# Patient Record
Sex: MALE | Race: WHITE | NOT HISPANIC OR LATINO | Employment: OTHER | URBAN - METROPOLITAN AREA
[De-identification: names, ages, dates, MRNs, and addresses within clinical notes are randomized per-mention and may not be internally consistent; named-entity substitution may affect disease eponyms.]

---

## 2018-01-29 ENCOUNTER — OFFICE VISIT (OUTPATIENT)
Dept: URGENT CARE | Age: 63
End: 2018-01-29
Payer: COMMERCIAL

## 2018-01-29 VITALS
OXYGEN SATURATION: 96 % | SYSTOLIC BLOOD PRESSURE: 125 MMHG | DIASTOLIC BLOOD PRESSURE: 67 MMHG | WEIGHT: 204 LBS | TEMPERATURE: 98 F | HEIGHT: 65 IN | HEART RATE: 52 BPM | BODY MASS INDEX: 33.99 KG/M2

## 2018-01-29 DIAGNOSIS — H65.191 OTHER ACUTE NONSUPPURATIVE OTITIS MEDIA OF RIGHT EAR, RECURRENCE NOT SPECIFIED: Primary | ICD-10-CM

## 2018-01-29 PROCEDURE — G0382 LEV 3 HOSP TYPE B ED VISIT: HCPCS | Performed by: FAMILY MEDICINE

## 2018-01-29 RX ORDER — ATORVASTATIN CALCIUM 80 MG/1
40 TABLET, FILM COATED ORAL
COMMUNITY
Start: 2014-10-31 | End: 2021-01-01 | Stop reason: HOSPADM

## 2018-01-29 RX ORDER — ACETAMINOPHEN 500 MG
500 TABLET ORAL EVERY 6 HOURS
COMMUNITY
End: 2021-01-01 | Stop reason: HOSPADM

## 2018-01-29 RX ORDER — AMOXICILLIN AND CLAVULANATE POTASSIUM 875; 125 MG/1; MG/1
1 TABLET, FILM COATED ORAL EVERY 12 HOURS SCHEDULED
Qty: 20 TABLET | Refills: 0 | Status: SHIPPED | OUTPATIENT
Start: 2018-01-29 | End: 2018-02-08

## 2018-01-29 RX ORDER — FUROSEMIDE 40 MG/1
40 TABLET ORAL
COMMUNITY
End: 2021-01-01 | Stop reason: SDUPTHER

## 2018-01-29 RX ORDER — AMLODIPINE BESYLATE 5 MG/1
5 TABLET ORAL
COMMUNITY
Start: 2012-12-06 | End: 2021-01-01 | Stop reason: SDUPTHER

## 2018-01-29 RX ORDER — FERROUS SULFATE 325(65) MG
325 TABLET ORAL
COMMUNITY
Start: 2016-10-09 | End: 2021-01-01 | Stop reason: HOSPADM

## 2018-01-29 NOTE — PROGRESS NOTES
Assessment/Plan:    No problem-specific Assessment & Plan notes found for this encounter  Diagnoses and all orders for this visit:    Other acute nonsuppurative otitis media of right ear, recurrence not specified  -     amoxicillin-clavulanate (AUGMENTIN) 875-125 mg per tablet; Take 1 tablet by mouth every 12 (twelve) hours for 10 days    Patient instructions: Start antibiotic  Give probiotic  Tylenol as needed for pain or fever  Rest and drink extra fluids  Try OTC antihistamine  Follow up with PCP if no improvement  Go to ER with worsening symptoms  Subjective:      Patient ID: Grisel Izquierdo is a 57 y o  male  This is a 57year old male here today with right ear pain  He states he has had ear pain for 1 month  He was seen by PCP and given antibiotic, unsure which one  He denies fever or nasal congestion  No cough  He states he has pain and decreased hearing  The following portions of the patient's history were reviewed and updated as appropriate: allergies, current medications, past family history, past medical history, past social history, past surgical history and problem list     Review of Systems   Constitutional: Negative for activity change, chills and fatigue  HENT: Positive for ear pain  Negative for congestion, sinus pain and sinus pressure  Respiratory: Negative for cough  Cardiovascular: Negative for chest pain  Objective:  /67   Pulse (!) 52   Temp 98 °F (36 7 °C)   Ht 5' 5" (1 651 m)   Wt 92 5 kg (204 lb)   SpO2 96%   BMI 33 95 kg/m²      Physical Exam   Constitutional: He appears well-developed and well-nourished  HENT:   Head: Normocephalic  Right TM erythemic and bulging  , Left TM clear  Eyes: Conjunctivae are normal  Pupils are equal, round, and reactive to light  Neck: Normal range of motion  Neck supple  Cardiovascular: Normal rate      Pulmonary/Chest: Effort normal and breath sounds normal

## 2018-01-29 NOTE — PATIENT INSTRUCTIONS
Start antibiotic  Give probiotic  Tylenol as needed for pain or fever  Rest and drink extra fluids  Try OTC antihistamine  Follow up with PCP if no improvement  Go to ER with worsening symptoms  Otitis Media   WHAT YOU NEED TO KNOW:   Otitis media is an ear infection  DISCHARGE INSTRUCTIONS:   Medicines:  · Ibuprofen or acetaminophen  helps decrease your pain and fever  They are available without a doctor's order  Ask your healthcare provider which medicine is right for you  Ask how much to take and how often to take it  These medicines can cause stomach bleeding if not taken correctly  Ibuprofen can cause kidney damage  Do not take ibuprofen if you have kidney disease, an ulcer, or allergies to aspirin  Acetaminophen can cause liver damage  Do not drink alcohol if you take acetaminophen  · Ear drops  help treat your ear pain  · Antibiotics  help treat a bacterial infection that caused your ear infection  · Take your medicine as directed  Contact your healthcare provider if you think your medicine is not helping or if you have side effects  Tell him or her if you are allergic to any medicine  Keep a list of the medicines, vitamins, and herbs you take  Include the amounts, and when and why you take them  Bring the list or the pill bottles to follow-up visits  Carry your medicine list with you in case of an emergency  Heat or ice:   · Heat  may be used to decrease your pain  Place a warm, moist washcloth on your ear  Apply for 15 to 20 minutes, 3 to 4 times a day    · Ice  helps decrease swelling and pain  Use an ice pack or put crushed ice in a plastic bag  Cover the ice pack with a towel and place it on your ear for 15 to 20 minutes, 3 to 4 times a day for 2 days  Prevent otitis media:   · Wash your hands often  Use soap and water  Wash your hands after you use the bathroom, change a child's diapers, or sneeze  Wash your hands before you prepare or eat food       · Stay away from people who are ill   Some germs are easily and quickly spread through contact  Return to work or school: You may return to work or school when your fever is gone  Follow up with your healthcare provider as directed:  Write down your questions so you remember to ask them during your visits  Contact your healthcare provider if:   · Your ear pain gets worse or does not go away, even after treatment  · The outside of your ear is red or swollen  · You have vomiting or diarrhea  · You have fluid coming from your ear  · You have questions or concerns about your condition or care  Return to the emergency department if:   · You have a seizure  · You have a fever and a stiff neck  © 2017 [employer] Information is for End User's use only and may not be sold, redistributed or otherwise used for commercial purposes  All illustrations and images included in CareNotes® are the copyrighted property of [employer]  or [employer]  The above information is an  only  It is not intended as medical advice for individual conditions or treatments  Talk to your doctor, nurse or pharmacist before following any medical regimen to see if it is safe and effective for you

## 2018-01-29 NOTE — PROGRESS NOTES
Unable to reconcile medications correctly due to patients lack of knowledge re this     He states " I take a lot of medications and I dont know "

## 2018-06-21 LAB
ABSOL LYMPHOCYTES (HISTORICAL): 1.4 K/UL (ref 0.5–4)
ALBUMIN SERPL BCP-MCNC: 3.3 G/DL (ref 3–5.2)
ALP SERPL-CCNC: 141 U/L (ref 43–122)
ALT SERPL W P-5'-P-CCNC: 40 U/L (ref 9–52)
ANION GAP SERPL CALCULATED.3IONS-SCNC: 8 MMOL/L (ref 5–14)
AST SERPL W P-5'-P-CCNC: 32 U/L (ref 17–59)
BANDS (HISTORICAL): 9 % (ref 3–11)
BASOPHILS # BLD AUTO: 0.1 K/UL (ref 0–0.1)
BASOPHILS # BLD AUTO: 1 % (ref 0–1)
BILIRUB SERPL-MCNC: 0.4 MG/DL
BUN SERPL-MCNC: 29 MG/DL (ref 5–25)
CALCIUM SERPL-MCNC: 9.2 MG/DL (ref 8.4–10.2)
CHLORIDE SERPL-SCNC: 109 MEQ/L (ref 97–108)
CO2 SERPL-SCNC: 22 MMOL/L (ref 22–30)
COMMENT (HISTORICAL): ABNORMAL
CREATINE, SERUM (HISTORICAL): 1.8 MG/DL (ref 0.7–1.5)
DEPRECATED RDW RBC AUTO: 16.2 %
EGFR (HISTORICAL): 38 ML/MIN/1.73 M2
EOSINOPHIL # BLD AUTO: 0.3 K/UL (ref 0–0.4)
EOSINOPHIL NFR BLD AUTO: 6 % (ref 0–6)
ERYTHROCYTE SEDIMENTATION RATE (HISTORICAL): 64 MM (ref 1–20)
FERRITIN SERPL-MCNC: 139 NG/ML (ref 18–464)
FOLATE SERPL-MCNC: >20 NG/ML
GLUCOSE SERPL-MCNC: 54 MG/DL (ref 70–99)
HCT VFR BLD AUTO: 42 % (ref 41–53)
HGB BLD-MCNC: 13.8 G/DL (ref 13.5–17.5)
IRON SERPL-MCNC: 66 UG/DL (ref 49–181)
LDH (HISTORICAL): 466 U/L (ref 313–618)
LYMPHOCYTES NFR BLD AUTO: 26 % (ref 25–45)
MCH RBC QN AUTO: 25.7 PG (ref 26–34)
MCHC RBC AUTO-ENTMCNC: 32.8 % (ref 31–36)
MCV RBC AUTO: 78 FL (ref 80–100)
MONOCYTES # BLD AUTO: 0.5 K/UL (ref 0.2–0.9)
MONOCYTES NFR BLD AUTO: 9 % (ref 1–10)
NEUTROPHILS ABS COUNT (HISTORICAL): 3.1 K/UL (ref 1.8–7.8)
NEUTS SEG NFR BLD AUTO: 49 % (ref 45–65)
PERCENT SATURATION (HISTORICAL): 24 % (ref 11–46)
PLATELET # BLD AUTO: 133 K/MCL (ref 150–450)
POTASSIUM SERPL-SCNC: 4.4 MEQ/L (ref 3.6–5)
RBC # BLD AUTO: 5.37 M/MCL (ref 4.5–5.9)
RBC MORPHOLOGY (HISTORICAL): ABNORMAL
SODIUM SERPL-SCNC: 139 MEQ/L (ref 137–147)
TIBC SERPL-MCNC: 276 UG/DL (ref 261–462)
TOTAL PROTEIN (HISTORICAL): 7 G/DL (ref 5.9–8.4)
VIT B12 SERPL-MCNC: 959 PG/ML (ref 239–931)
WBC # BLD AUTO: 5.4 K/MCL (ref 4.5–11)

## 2018-06-22 LAB — C-REACTIVE PROTEIN (HISTORICAL): 1.7 MG/DL

## 2018-06-23 LAB — METHYLMALONIC ACID, S (HISTORICAL): 0.23

## 2018-06-25 ENCOUNTER — OFFICE VISIT (OUTPATIENT)
Dept: HEMATOLOGY ONCOLOGY | Facility: CLINIC | Age: 63
End: 2018-06-25
Payer: COMMERCIAL

## 2018-06-25 VITALS
TEMPERATURE: 97.5 F | DIASTOLIC BLOOD PRESSURE: 60 MMHG | HEIGHT: 65 IN | WEIGHT: 208 LBS | BODY MASS INDEX: 34.66 KG/M2 | HEART RATE: 60 BPM | RESPIRATION RATE: 18 BRPM | SYSTOLIC BLOOD PRESSURE: 112 MMHG | OXYGEN SATURATION: 96 %

## 2018-06-25 DIAGNOSIS — D64.9 ANEMIA, UNSPECIFIED TYPE: Primary | ICD-10-CM

## 2018-06-25 PROCEDURE — 99213 OFFICE O/P EST LOW 20 MIN: CPT | Performed by: INTERNAL MEDICINE

## 2018-06-25 RX ORDER — BLOOD SUGAR DIAGNOSTIC
STRIP MISCELLANEOUS
COMMUNITY
Start: 2018-06-22 | End: 2021-01-01 | Stop reason: HOSPADM

## 2018-06-25 RX ORDER — PANTOPRAZOLE SODIUM 40 MG/1
40 TABLET, DELAYED RELEASE ORAL
COMMUNITY
Start: 2017-12-05 | End: 2021-01-01 | Stop reason: HOSPADM

## 2018-06-25 RX ORDER — LINAGLIPTIN 5 MG/1
TABLET, FILM COATED ORAL
COMMUNITY
Start: 2018-06-22 | End: 2021-01-01 | Stop reason: HOSPADM

## 2018-06-25 RX ORDER — METOCLOPRAMIDE 10 MG/1
TABLET ORAL
COMMUNITY
Start: 2018-06-22 | End: 2021-01-01 | Stop reason: SDUPTHER

## 2018-06-25 RX ORDER — LINACLOTIDE 145 UG/1
CAPSULE, GELATIN COATED ORAL
COMMUNITY
Start: 2018-06-22 | End: 2021-01-01 | Stop reason: HOSPADM

## 2018-06-25 RX ORDER — PARICALCITOL 1 UG/1
1 CAPSULE, LIQUID FILLED ORAL
COMMUNITY
Start: 2018-02-05 | End: 2021-01-01 | Stop reason: HOSPADM

## 2018-06-25 RX ORDER — IBUPROFEN 800 MG/1
800 TABLET ORAL EVERY 6 HOURS
Refills: 0 | COMMUNITY
Start: 2018-04-30 | End: 2021-01-01 | Stop reason: HOSPADM

## 2018-06-25 RX ORDER — KETOCONAZOLE 20 MG/ML
SHAMPOO TOPICAL
COMMUNITY
Start: 2018-06-22 | End: 2021-01-01 | Stop reason: HOSPADM

## 2018-06-25 RX ORDER — ISOSORBIDE MONONITRATE 60 MG/1
TABLET, EXTENDED RELEASE ORAL
COMMUNITY
Start: 2018-06-22 | End: 2020-04-30

## 2018-06-25 RX ORDER — MULTIVITAMIN WITH FOLIC ACID 400 MCG
TABLET ORAL
COMMUNITY
Start: 2018-04-03 | End: 2021-01-01 | Stop reason: SDUPTHER

## 2018-06-25 RX ORDER — POLYETHYLENE GLYCOL 1000
17 POWDER (GRAM) MISCELLANEOUS
COMMUNITY
Start: 2016-04-19 | End: 2021-01-01 | Stop reason: HOSPADM

## 2018-06-25 NOTE — PROGRESS NOTES
Hematology/Oncology Outpatient Follow-up  Delicia Dan 57 y o  male 1955 [id number]    Date:  6/25/2018      Assessment and Plan:    The patient continues to have chronic inflammatory process with elevated C-reactive protein and sed rate  He has microcytosis without anemia and mild thrombocytopenia  The patient was encouraged to continue with the current medication including vitamin B12 supplements, we will then see him in 6 months from now for close follow-up  HPI:    Patient is doing well, his blood work seems to be stable  He only complains about occasional respiratory symptoms  He did lose some weight that through the healthy dieting  Interval history:    ROS: Review of Systems   Constitutional: Negative for appetite change, diaphoresis, fatigue and fever  HENT: Negative for congestion, dental problem, facial swelling, hearing loss, tinnitus and trouble swallowing  Eyes: Negative for visual disturbance  Respiratory: Positive for shortness of breath (SOB at rest)  Negative for cough, chest tightness and wheezing  Cardiovascular: Negative for chest pain and leg swelling  Gastrointestinal: Negative for abdominal distention, abdominal pain, blood in stool, constipation, diarrhea, nausea and vomiting  Genitourinary: Negative for dysuria, hematuria and urgency  Musculoskeletal: Negative for arthralgias, myalgias and neck pain  Skin: Negative  Negative for color change, pallor, rash and wound  Neurological: Negative for dizziness, weakness, numbness and headaches  Hematological: Negative for adenopathy  Psychiatric/Behavioral: Negative for agitation, behavioral problems, confusion, hallucinations, self-injury and sleep disturbance  The patient is not nervous/anxious and is not hyperactive  Past Medical History:   Diagnosis Date    Chronic kidney disease     GERD (gastroesophageal reflux disease)     Hypertension        No past surgical history on file      Social History     Social History    Marital status: /Civil Union     Spouse name: N/A    Number of children: N/A    Years of education: N/A     Social History Main Topics    Smoking status: Not on file    Smokeless tobacco: Not on file    Alcohol use Not on file    Drug use: Unknown    Sexual activity: Not on file     Other Topics Concern    Not on file     Social History Narrative    No narrative on file       No family history on file  Allergies   Allergen Reactions    Calcium Itching         Current Outpatient Prescriptions:     acetaminophen (TYLENOL) 500 mg tablet, Take 500 mg by mouth every 6 (six) hours, Disp: , Rfl:     amLODIPine (NORVASC) 5 mg tablet, Take 5 mg by mouth, Disp: , Rfl:     Artificial Tear Ointment (REFRESH LACRI-LUBE OP), Apply 1 drop to eye 4 (four) times a day, Disp: , Rfl:     atorvastatin (LIPITOR) 80 mg tablet, Take 40 mg by mouth, Disp: , Rfl:     Cholecalciferol 2000 units CAPS, Take 1 capsule by mouth, Disp: , Rfl:     ferrous sulfate 325 (65 Fe) mg tablet, Take 325 mg by mouth, Disp: , Rfl:     furosemide (LASIX) 40 mg tablet, Take 40 mg by mouth, Disp: , Rfl:       Physical Exam:  There were no vitals taken for this visit  Physical Exam   Constitutional: He is oriented to person, place, and time  He appears well-developed and well-nourished  HENT:   Head: Normocephalic and atraumatic  Nose: Nose normal    Mouth/Throat: Oropharynx is clear and moist    Eyes: Conjunctivae and EOM are normal  Pupils are equal, round, and reactive to light  Right eye exhibits no discharge  Left eye exhibits no discharge  No scleral icterus  Neck: Normal range of motion  Neck supple  No tracheal deviation present  No thyromegaly present  Cardiovascular: Normal rate, regular rhythm and normal heart sounds  Exam reveals no friction rub  No murmur heard  Pulmonary/Chest: Breath sounds normal  He is in respiratory distress (SOB at rest)  He has no wheezes   He has no rales  He exhibits no tenderness  Abdominal: Soft  Bowel sounds are normal  He exhibits no distension and no mass  There is no hepatosplenomegaly, splenomegaly or hepatomegaly  There is no tenderness  There is no rebound and no guarding  Musculoskeletal: Normal range of motion  He exhibits no edema, tenderness or deformity  Lymphadenopathy:     He has no cervical adenopathy  Neurological: He is alert and oriented to person, place, and time  He has normal reflexes  No cranial nerve deficit  Coordination normal    Skin: Skin is warm and dry  No rash noted  No erythema  No pallor  Psychiatric: He has a normal mood and affect  His behavior is normal  Judgment and thought content normal          Labs:  Lab Results   Component Value Date    WBC 5 4 06/21/2018    HGB 13 8 06/21/2018    HCT 42 0 06/21/2018    MCV 78 (L) 06/21/2018     (L) 06/21/2018     Lab Results   Component Value Date     06/21/2018    K 4 4 06/21/2018     (H) 06/21/2018    CO2 22 06/21/2018    ANIONGAP 8 06/21/2018    BUN 29 (H) 06/21/2018    GLUCOSE 54 (L) 06/21/2018    CALCIUM 9 2 06/21/2018    AST 32 06/21/2018    ALT 40 06/21/2018    ALKPHOS 141 (H) 06/21/2018    PROT 7 0 06/21/2018    BILITOT 0 4 06/21/2018       Patient voiced understanding and agreement in the above discussion  Aware to contact our office with questions/symptoms in the interim

## 2018-08-03 ENCOUNTER — APPOINTMENT (OUTPATIENT)
Dept: LAB | Facility: HOSPITAL | Age: 63
End: 2018-08-03
Payer: COMMERCIAL

## 2018-08-03 ENCOUNTER — TRANSCRIBE ORDERS (OUTPATIENT)
Dept: ADMINISTRATIVE | Facility: HOSPITAL | Age: 63
End: 2018-08-03

## 2018-08-03 DIAGNOSIS — I13.10 HYPERTENSIVE HEART DISEASE, BENIGN, WITH CHRONIC KIDNEY DISEASE STAGE 1-4: ICD-10-CM

## 2018-08-03 DIAGNOSIS — N25.81 SECONDARY HYPERPARATHYROIDISM (HCC): ICD-10-CM

## 2018-08-03 DIAGNOSIS — K92.2 GASTRIC HEMORRHAGE: ICD-10-CM

## 2018-08-03 DIAGNOSIS — N40.0 PROSTATE HYPERTROPHY: ICD-10-CM

## 2018-08-03 DIAGNOSIS — I25.10 CORONARY ARTERIOSCLEROSIS: ICD-10-CM

## 2018-08-03 DIAGNOSIS — E10.22 TYPE 1 DIABETES MELLITUS WITH DIABETIC CHRONIC KIDNEY DISEASE, UNSPECIFIED CKD STAGE (HCC): ICD-10-CM

## 2018-08-03 DIAGNOSIS — N18.30 CHRONIC KIDNEY DISEASE, STAGE III (MODERATE) (HCC): ICD-10-CM

## 2018-08-03 DIAGNOSIS — D50.9 IRON DEFICIENCY ANEMIA, UNSPECIFIED IRON DEFICIENCY ANEMIA TYPE: ICD-10-CM

## 2018-08-03 DIAGNOSIS — N18.9 HYPERTENSIVE HEART DISEASE, BENIGN, WITH CHRONIC KIDNEY DISEASE STAGE 1-4: ICD-10-CM

## 2018-08-03 DIAGNOSIS — R60.0 EDEMA OF LOWER EXTREMITY: ICD-10-CM

## 2018-08-03 DIAGNOSIS — N20.0 KIDNEY STONE: ICD-10-CM

## 2018-08-03 DIAGNOSIS — E55.9 VITAMIN D DEFICIENCY, UNSPECIFIED: ICD-10-CM

## 2018-08-03 DIAGNOSIS — N18.30 CHRONIC KIDNEY DISEASE, STAGE III (MODERATE) (HCC): Primary | ICD-10-CM

## 2018-08-03 LAB
ALBUMIN SERPL BCP-MCNC: 3.9 G/DL (ref 3–5.2)
ANION GAP SERPL CALCULATED.3IONS-SCNC: 7 MMOL/L (ref 5–14)
BILIRUB UR QL STRIP: NEGATIVE
BUN SERPL-MCNC: 24 MG/DL (ref 5–25)
CALCIUM SERPL-MCNC: 9.3 MG/DL (ref 8.4–10.2)
CHLORIDE SERPL-SCNC: 106 MMOL/L (ref 97–108)
CLARITY UR: CLEAR
CO2 SERPL-SCNC: 28 MMOL/L (ref 22–30)
COLOR UR: YELLOW
CREAT SERPL-MCNC: 1.76 MG/DL (ref 0.7–1.5)
ERYTHROCYTE [DISTWIDTH] IN BLOOD BY AUTOMATED COUNT: 16.1 %
GFR SERPL CREATININE-BSD FRML MDRD: 40 ML/MIN/1.73SQ M
GLUCOSE P FAST SERPL-MCNC: 47 MG/DL (ref 70–99)
GLUCOSE UR STRIP-MCNC: NEGATIVE MG/DL
HCT VFR BLD AUTO: 43 % (ref 41–53)
HGB BLD-MCNC: 14.2 G/DL (ref 13.5–17.5)
HGB UR QL STRIP.AUTO: NEGATIVE
KETONES UR STRIP-MCNC: NEGATIVE MG/DL
LEUKOCYTE ESTERASE UR QL STRIP: NEGATIVE
MAGNESIUM SERPL-MCNC: 1.8 MG/DL (ref 1.6–2.3)
MCH RBC QN AUTO: 26.4 PG (ref 26–34)
MCHC RBC AUTO-ENTMCNC: 32.9 G/DL (ref 31–36)
MCV RBC AUTO: 80 FL (ref 80–100)
NITRITE UR QL STRIP: NEGATIVE
PH UR STRIP.AUTO: 6 [PH] (ref 4.5–8)
PHOSPHATE SERPL-MCNC: 4 MG/DL (ref 2.5–4.8)
PLATELET # BLD AUTO: 134 THOUSANDS/UL (ref 150–450)
PMV BLD AUTO: 8.6 FL (ref 8.9–12.7)
POTASSIUM SERPL-SCNC: 4 MMOL/L (ref 3.6–5)
PROT UR STRIP-MCNC: NEGATIVE MG/DL
PTH-INTACT SERPL-MCNC: 46.7 PG/ML (ref 16.7–78.9)
RBC # BLD AUTO: 5.37 MILLION/UL (ref 4.5–5.9)
SODIUM SERPL-SCNC: 141 MMOL/L (ref 137–147)
SP GR UR STRIP.AUTO: 1.01 (ref 1–1.04)
URATE SERPL-MCNC: 8 MG/DL (ref 3.5–8.5)
UROBILINOGEN UA: NEGATIVE MG/DL
WBC # BLD AUTO: 5.5 THOUSAND/UL (ref 4.5–11)

## 2018-08-03 PROCEDURE — 84550 ASSAY OF BLOOD/URIC ACID: CPT

## 2018-08-03 PROCEDURE — 85027 COMPLETE CBC AUTOMATED: CPT

## 2018-08-03 PROCEDURE — 83970 ASSAY OF PARATHORMONE: CPT

## 2018-08-03 PROCEDURE — 83735 ASSAY OF MAGNESIUM: CPT

## 2018-08-03 PROCEDURE — 36415 COLL VENOUS BLD VENIPUNCTURE: CPT

## 2018-08-03 PROCEDURE — 80069 RENAL FUNCTION PANEL: CPT

## 2018-09-24 ENCOUNTER — TRANSCRIBE ORDERS (OUTPATIENT)
Dept: ADMINISTRATIVE | Facility: HOSPITAL | Age: 63
End: 2018-09-24

## 2018-09-24 ENCOUNTER — APPOINTMENT (OUTPATIENT)
Dept: LAB | Facility: HOSPITAL | Age: 63
End: 2018-09-24
Payer: COMMERCIAL

## 2018-09-24 DIAGNOSIS — E66.09 EXOGENOUS OBESITY: ICD-10-CM

## 2018-09-24 DIAGNOSIS — E55.9 AVITAMINOSIS D: ICD-10-CM

## 2018-09-24 DIAGNOSIS — Z79.899 LONG TERM USE OF DRUG: ICD-10-CM

## 2018-09-24 DIAGNOSIS — I10 HYPERTENSION, UNSPECIFIED TYPE: ICD-10-CM

## 2018-09-24 DIAGNOSIS — Z79.899 LONG TERM USE OF DRUG: Primary | ICD-10-CM

## 2018-09-24 DIAGNOSIS — E78.2 MIXED HYPERLIPIDEMIA: ICD-10-CM

## 2018-09-24 DIAGNOSIS — I25.119 ATHEROSCLEROSIS OF NATIVE CORONARY ARTERY WITH ANGINA PECTORIS, UNSPECIFIED WHETHER NATIVE OR TRANSPLANTED HEART (HCC): ICD-10-CM

## 2018-09-24 DIAGNOSIS — N18.6 TYPE 2 DIABETES MELLITUS WITH ESRD (END-STAGE RENAL DISEASE) (HCC): ICD-10-CM

## 2018-09-24 DIAGNOSIS — E11.22 TYPE 2 DIABETES MELLITUS WITH ESRD (END-STAGE RENAL DISEASE) (HCC): ICD-10-CM

## 2018-09-24 DIAGNOSIS — Z79.4 ENCOUNTER FOR LONG-TERM (CURRENT) USE OF INSULIN (HCC): ICD-10-CM

## 2018-09-24 DIAGNOSIS — E11.21 DIABETIC GLOMERULOPATHY (HCC): ICD-10-CM

## 2018-09-24 LAB
ALBUMIN SERPL BCP-MCNC: 3.4 G/DL (ref 3–5.2)
ALP SERPL-CCNC: 155 U/L (ref 43–122)
ALT SERPL W P-5'-P-CCNC: 48 U/L (ref 9–52)
ANION GAP SERPL CALCULATED.3IONS-SCNC: 8 MMOL/L (ref 5–14)
AST SERPL W P-5'-P-CCNC: 54 U/L (ref 17–59)
BASOPHILS # BLD AUTO: 0.04 THOUSAND/UL (ref 0–0.1)
BASOPHILS NFR MAR MANUAL: 1 % (ref 0–1)
BILIRUB SERPL-MCNC: 0.7 MG/DL
BUN SERPL-MCNC: 23 MG/DL (ref 5–25)
CALCIUM SERPL-MCNC: 9 MG/DL (ref 8.4–10.2)
CHLORIDE SERPL-SCNC: 103 MMOL/L (ref 97–108)
CHOLEST SERPL-MCNC: 124 MG/DL
CO2 SERPL-SCNC: 30 MMOL/L (ref 22–30)
CREAT SERPL-MCNC: 1.54 MG/DL (ref 0.7–1.5)
EOSINOPHIL # BLD AUTO: 0.44 THOUSAND/UL (ref 0–0.4)
EOSINOPHIL NFR BLD MANUAL: 10 % (ref 0–6)
ERYTHROCYTE [DISTWIDTH] IN BLOOD BY AUTOMATED COUNT: 15.6 %
EST. AVERAGE GLUCOSE BLD GHB EST-MCNC: 154 MG/DL
GFR SERPL CREATININE-BSD FRML MDRD: 47 ML/MIN/1.73SQ M
GLUCOSE P FAST SERPL-MCNC: 59 MG/DL (ref 70–99)
HBA1C MFR BLD: 7 % (ref 4.2–6.3)
HCT VFR BLD AUTO: 43.1 % (ref 41–53)
HDLC SERPL-MCNC: 43 MG/DL (ref 40–59)
HGB BLD-MCNC: 14 G/DL (ref 13.5–17.5)
LDLC SERPL CALC-MCNC: 60 MG/DL
LYMPHOCYTES # BLD AUTO: 1.85 THOUSAND/UL (ref 0.5–4)
LYMPHOCYTES # BLD AUTO: 42 % (ref 20–50)
MCH RBC QN AUTO: 25.5 PG (ref 26–34)
MCHC RBC AUTO-ENTMCNC: 32.5 G/DL (ref 31–36)
MCV RBC AUTO: 79 FL (ref 80–100)
MONOCYTES # BLD AUTO: 0.62 THOUSAND/UL (ref 0.2–0.9)
MONOCYTES NFR BLD AUTO: 14 % (ref 1–10)
NEUTS BAND NFR BLD MANUAL: 1 % (ref 0–8)
NEUTS SEG # BLD: 1.45 THOUSAND/UL (ref 1.8–7.8)
NEUTS SEG NFR BLD AUTO: 32 %
NONHDLC SERPL-MCNC: 81 MG/DL
PLATELET # BLD AUTO: 129 THOUSANDS/UL (ref 150–450)
PLATELET BLD QL SMEAR: ADEQUATE
PMV BLD AUTO: 8.1 FL (ref 8.9–12.7)
POTASSIUM SERPL-SCNC: 4 MMOL/L (ref 3.6–5)
PROT SERPL-MCNC: 7.1 G/DL (ref 5.9–8.4)
PTH-INTACT SERPL-MCNC: 64.6 PG/ML (ref 16.7–78.9)
RBC # BLD AUTO: 5.48 MILLION/UL (ref 4.5–5.9)
RBC MORPH BLD: NORMAL
SODIUM SERPL-SCNC: 141 MMOL/L (ref 137–147)
TOTAL CELLS COUNTED SPEC: 100
TRIGL SERPL-MCNC: 103 MG/DL
WBC # BLD AUTO: 4.4 THOUSAND/UL (ref 4.5–11)

## 2018-09-24 PROCEDURE — 85027 COMPLETE CBC AUTOMATED: CPT

## 2018-09-24 PROCEDURE — 36415 COLL VENOUS BLD VENIPUNCTURE: CPT | Performed by: INTERNAL MEDICINE

## 2018-09-24 PROCEDURE — 80061 LIPID PANEL: CPT

## 2018-09-24 PROCEDURE — 85007 BL SMEAR W/DIFF WBC COUNT: CPT

## 2018-09-24 PROCEDURE — 80053 COMPREHEN METABOLIC PANEL: CPT

## 2018-09-24 PROCEDURE — 83036 HEMOGLOBIN GLYCOSYLATED A1C: CPT | Performed by: INTERNAL MEDICINE

## 2018-09-24 PROCEDURE — 83970 ASSAY OF PARATHORMONE: CPT

## 2018-12-21 ENCOUNTER — APPOINTMENT (OUTPATIENT)
Dept: LAB | Facility: HOSPITAL | Age: 63
End: 2018-12-21
Attending: INTERNAL MEDICINE
Payer: MEDICARE

## 2018-12-21 DIAGNOSIS — D64.9 ANEMIA, UNSPECIFIED TYPE: ICD-10-CM

## 2018-12-21 LAB
ALBUMIN SERPL BCP-MCNC: 3.4 G/DL (ref 3–5.2)
ALP SERPL-CCNC: 138 U/L (ref 43–122)
ALT SERPL W P-5'-P-CCNC: 33 U/L (ref 9–52)
ANION GAP SERPL CALCULATED.3IONS-SCNC: 6 MMOL/L (ref 5–14)
AST SERPL W P-5'-P-CCNC: 33 U/L (ref 17–59)
BILIRUB SERPL-MCNC: 0.5 MG/DL
BUN SERPL-MCNC: 22 MG/DL (ref 5–25)
CALCIUM SERPL-MCNC: 8.7 MG/DL (ref 8.4–10.2)
CHLORIDE SERPL-SCNC: 104 MMOL/L (ref 97–108)
CO2 SERPL-SCNC: 28 MMOL/L (ref 22–30)
CREAT SERPL-MCNC: 1.58 MG/DL (ref 0.7–1.5)
EOSINOPHIL # BLD AUTO: 0.08 THOUSAND/UL (ref 0–0.4)
EOSINOPHIL NFR BLD MANUAL: 2 % (ref 0–6)
ERYTHROCYTE [DISTWIDTH] IN BLOOD BY AUTOMATED COUNT: 15.2 %
ERYTHROCYTE [SEDIMENTATION RATE] IN BLOOD: 49 MM/HOUR (ref 1–20)
FERRITIN SERPL-MCNC: 179 NG/ML (ref 8–388)
GFR SERPL CREATININE-BSD FRML MDRD: 45 ML/MIN/1.73SQ M
GLUCOSE SERPL-MCNC: 145 MG/DL (ref 70–99)
HCT VFR BLD AUTO: 43.8 % (ref 41–53)
HGB BLD-MCNC: 13.8 G/DL (ref 13.5–17.5)
IRON SATN MFR SERPL: 33 %
IRON SERPL-MCNC: 81 UG/DL (ref 65–175)
LYMPHOCYTES # BLD AUTO: 1.76 THOUSAND/UL (ref 0.5–4)
LYMPHOCYTES # BLD AUTO: 42 % (ref 25–45)
MCH RBC QN AUTO: 25.3 PG (ref 26–34)
MCHC RBC AUTO-ENTMCNC: 31.7 G/DL (ref 31–36)
MCV RBC AUTO: 80 FL (ref 80–100)
MONOCYTES # BLD AUTO: 0.13 THOUSAND/UL (ref 0.2–0.9)
MONOCYTES NFR BLD AUTO: 3 % (ref 1–10)
NEUTS SEG # BLD: 2.23 THOUSAND/UL (ref 1.8–7.8)
NEUTS SEG NFR BLD AUTO: 53 %
PLATELET # BLD AUTO: 139 THOUSANDS/UL (ref 150–450)
PLATELET BLD QL SMEAR: ABNORMAL
PMV BLD AUTO: 8.8 FL (ref 8.9–12.7)
POTASSIUM SERPL-SCNC: 4.2 MMOL/L (ref 3.6–5)
PROT SERPL-MCNC: 7.2 G/DL (ref 5.9–8.4)
RBC # BLD AUTO: 5.47 MILLION/UL (ref 4.5–5.9)
RBC MORPH BLD: NORMAL
SODIUM SERPL-SCNC: 138 MMOL/L (ref 137–147)
TIBC SERPL-MCNC: 245 UG/DL (ref 250–450)
TOTAL CELLS COUNTED SPEC: 100
VIT B12 SERPL-MCNC: 1103 PG/ML (ref 100–900)
WBC # BLD AUTO: 4.2 THOUSAND/UL (ref 4.5–11)

## 2018-12-21 PROCEDURE — 85027 COMPLETE CBC AUTOMATED: CPT

## 2018-12-21 PROCEDURE — 83540 ASSAY OF IRON: CPT

## 2018-12-21 PROCEDURE — 36415 COLL VENOUS BLD VENIPUNCTURE: CPT

## 2018-12-21 PROCEDURE — 80053 COMPREHEN METABOLIC PANEL: CPT

## 2018-12-21 PROCEDURE — 82607 VITAMIN B-12: CPT

## 2018-12-21 PROCEDURE — 82728 ASSAY OF FERRITIN: CPT

## 2018-12-21 PROCEDURE — 83550 IRON BINDING TEST: CPT

## 2018-12-21 PROCEDURE — 85652 RBC SED RATE AUTOMATED: CPT

## 2018-12-21 PROCEDURE — 85007 BL SMEAR W/DIFF WBC COUNT: CPT

## 2018-12-26 PROBLEM — D50.9 MICROCYTIC ANEMIA: Status: ACTIVE | Noted: 2018-12-26

## 2018-12-26 PROBLEM — D69.6 THROMBOCYTOPENIA (HCC): Status: ACTIVE | Noted: 2018-12-26

## 2019-01-02 ENCOUNTER — OFFICE VISIT (OUTPATIENT)
Dept: HEMATOLOGY ONCOLOGY | Facility: CLINIC | Age: 64
End: 2019-01-02
Payer: MEDICARE

## 2019-01-02 VITALS
TEMPERATURE: 97.2 F | RESPIRATION RATE: 18 BRPM | BODY MASS INDEX: 33.82 KG/M2 | WEIGHT: 203 LBS | HEIGHT: 65 IN | SYSTOLIC BLOOD PRESSURE: 110 MMHG | DIASTOLIC BLOOD PRESSURE: 64 MMHG | HEART RATE: 58 BPM | OXYGEN SATURATION: 95 %

## 2019-01-02 DIAGNOSIS — D69.6 THROMBOCYTOPENIA (HCC): ICD-10-CM

## 2019-01-02 DIAGNOSIS — D50.9 MICROCYTIC ANEMIA: Primary | ICD-10-CM

## 2019-01-02 PROCEDURE — 99213 OFFICE O/P EST LOW 20 MIN: CPT | Performed by: INTERNAL MEDICINE

## 2019-01-02 NOTE — PROGRESS NOTES
Hematology/Oncology Outpatient Follow-up  Debora Parker 70 y o  male 1955 [id number]    Date:  1/2/2019    Assessment and Plan:  1  Microcytic anemia  His hemoglobin level and MCV are within normal range at this point in time  He does have chronic inflammatory process which is contributing factor for his chronic microcytic anemia  We will see him again in 6 months from now for close follow-up  - CBC and differential; Future  - Comprehensive metabolic panel; Future  - Iron Panel; Future  - Ferritin; Future  - Vitamin B12; Future  - Sedimentation rate, automated; Future  - C-reactive protein; Future    2  Thrombocytopenia ([facility] [address] )  This was investigated extensively which seems to be chronic and mild  We will continue to monitor it closely  - CBC and differential; Future  - Comprehensive metabolic panel; Future  - Iron Panel; Future  - Ferritin; Future  - Vitamin B12; Future  - Sedimentation rate, automated; Future  - C-reactive protein; Future      HPI:    The patient came today for a follow-up visit  He has multiple comorbid conditions including type 1 insulin-dependent diabetes mellitus since his early 25s, history of chronic renal dysfunction, hypertension, coronary artery disease status post coronary artery bypass surgery in 2008, history of gastric ulcer  The patient has borderline chronic thrombocytopenia and mild chronic microcytic anemia which where investigated extensively  His most recent blood work on the 21st of December showed sed rate of 49 with white cell count of 4 2 and hemoglobin level of 13 8  He continues to have low normal platelet count of 890  His creatinine is 1 5 with normal iron panel vitamin B12 level  Interval history:    ROS: Review of Systems   Constitutional: Negative for appetite change, diaphoresis, fatigue and fever  HENT: Negative for congestion, dental problem, facial swelling, hearing loss, tinnitus and trouble swallowing      Eyes: Negative for visual disturbance  Respiratory: Negative for cough, chest tightness, shortness of breath and wheezing  Cardiovascular: Negative for chest pain and leg swelling  Gastrointestinal: Negative for abdominal distention, abdominal pain, blood in stool, constipation, diarrhea, nausea and vomiting  Genitourinary: Negative for dysuria, hematuria and urgency  Musculoskeletal: Negative for arthralgias, myalgias and neck pain  Skin: Negative  Negative for color change, pallor, rash and wound  Neurological: Negative for dizziness, weakness, numbness and headaches  Hematological: Negative for adenopathy  Psychiatric/Behavioral: Positive for sleep disturbance  Negative for agitation, behavioral problems, confusion, hallucinations and self-injury  The patient is not nervous/anxious and is not hyperactive  Past Medical History:   Diagnosis Date    Chronic kidney disease     GERD (gastroesophageal reflux disease)     Hypertension        History reviewed  No pertinent surgical history  Social History     Social History    Marital status: /Civil Union     Spouse name: N/A    Number of children: N/A    Years of education: N/A     Social History Main Topics    Smoking status: Never Smoker    Smokeless tobacco: Never Used    Alcohol use No    Drug use: No    Sexual activity: Not Asked     Other Topics Concern    None     Social History Narrative    None       History reviewed  No pertinent family history      Allergies   Allergen Reactions    Calcium Itching         Current Outpatient Prescriptions:     acetaminophen (TYLENOL) 500 mg tablet, Take 500 mg by mouth every 6 (six) hours, Disp: , Rfl:     amLODIPine (NORVASC) 5 mg tablet, Take 5 mg by mouth, Disp: , Rfl:     Artificial Tear Ointment (REFRESH LACRI-LUBE OP), Apply 1 drop to eye 4 (four) times a day, Disp: , Rfl:     atorvastatin (LIPITOR) 80 mg tablet, Take 40 mg by mouth, Disp: , Rfl:     CARLEY MICROLET LANCETS lancets, , Disp: , Rfl:     Cholecalciferol 2000 units CAPS, Take 1 capsule by mouth, Disp: , Rfl:     CONTOUR NEXT TEST test strip, , Disp: , Rfl:     ferrous sulfate 325 (65 Fe) mg tablet, Take 325 mg by mouth, Disp: , Rfl:     furosemide (LASIX) 40 mg tablet, Take 40 mg by mouth, Disp: , Rfl:     ibuprofen (MOTRIN) 800 mg tablet, Take 800 mg by mouth every 6 (six) hours, Disp: , Rfl: 0    insulin aspart (NovoLOG) 100 Units/mL injection pen, Inject under the skin, Disp: , Rfl:     isosorbide mononitrate (IMDUR) 60 mg 24 hr tablet, , Disp: , Rfl:     ketoconazole (NIZORAL) 2 % shampoo, , Disp: , Rfl:     LINZESS 145 MCG CAPS, , Disp: , Rfl:     metoclopramide (REGLAN) 10 mg tablet, , Disp: , Rfl:     Multiple Vitamin (DAILY-CRISS) TABS, , Disp: , Rfl:     NOVOLOG 100 UNIT/ML injection, , Disp: , Rfl:     pantoprazole (PROTONIX) 40 mg tablet, Take 40 mg by mouth, Disp: , Rfl:     paricalcitol (ZEMPLAR) 1 mcg capsule, Take 1 mcg by mouth, Disp: , Rfl:     Polyethylene Glycol 1000 POWD, Take 17 g by mouth, Disp: , Rfl:     Psyllium 400 MG CAPS, Take 0 4 g by mouth, Disp: , Rfl:     TRADJENTA 5 MG TABS, , Disp: , Rfl:       Physical Exam:  /64 (BP Location: Left arm, Patient Position: Sitting, Cuff Size: Adult)   Pulse 58   Temp (!) 97 2 °F (36 2 °C) (Tympanic)   Resp 18   Ht 5' 4 5" (1 638 m)   Wt 92 1 kg (203 lb)   SpO2 95%   BMI 34 31 kg/m²     Physical Exam   Constitutional: He is oriented to person, place, and time  He appears well-developed and well-nourished  HENT:   Head: Normocephalic and atraumatic  Nose: Nose normal    Mouth/Throat: Oropharynx is clear and moist    Eyes: Pupils are equal, round, and reactive to light  Conjunctivae and EOM are normal  Right eye exhibits no discharge  Left eye exhibits no discharge  No scleral icterus  Neck: Normal range of motion  Neck supple  No tracheal deviation present  No thyromegaly present     Cardiovascular: Normal rate, regular rhythm and normal heart sounds  Exam reveals no friction rub  No murmur heard  Pulmonary/Chest: Effort normal and breath sounds normal  No respiratory distress  He has no wheezes  He has no rales  He exhibits no tenderness  Abdominal: Soft  Bowel sounds are normal  He exhibits no distension and no mass  There is no hepatosplenomegaly, splenomegaly or hepatomegaly  There is no tenderness  There is no rebound and no guarding  Musculoskeletal: Normal range of motion  He exhibits no edema, tenderness or deformity  Lymphadenopathy:     He has no cervical adenopathy  Neurological: He is alert and oriented to person, place, and time  He has normal reflexes  No cranial nerve deficit  Coordination normal    Skin: Skin is warm and dry  No rash noted  No erythema  No pallor  Psychiatric: He has a normal mood and affect  His behavior is normal  Judgment and thought content normal          Labs:  Lab Results   Component Value Date    WBC 4 20 (L) 12/21/2018    HGB 13 8 12/21/2018    HCT 43 8 12/21/2018    MCV 80 12/21/2018     (L) 12/21/2018     Lab Results   Component Value Date     06/21/2018    K 4 2 12/21/2018     12/21/2018    CO2 28 12/21/2018    ANIONGAP 8 06/21/2018    BUN 22 12/21/2018    CREATININE 1 58 (H) 12/21/2018    GLUCOSE 54 (L) 06/21/2018    GLUF 59 (L) 09/24/2018    CALCIUM 8 7 12/21/2018    AST 33 12/21/2018    ALT 33 12/21/2018    ALKPHOS 138 (H) 12/21/2018    PROT 7 0 06/21/2018    BILITOT 0 4 06/21/2018    EGFR 45 (L) 12/21/2018       Patient voiced understanding and agreement in the above discussion  Aware to contact our office with questions/symptoms in the interim

## 2019-01-03 ENCOUNTER — APPOINTMENT (OUTPATIENT)
Dept: LAB | Facility: HOSPITAL | Age: 64
End: 2019-01-03
Payer: MEDICARE

## 2019-01-03 ENCOUNTER — TRANSCRIBE ORDERS (OUTPATIENT)
Dept: ADMINISTRATIVE | Facility: HOSPITAL | Age: 64
End: 2019-01-03

## 2019-01-03 DIAGNOSIS — E55.9 AVITAMINOSIS D: ICD-10-CM

## 2019-01-03 DIAGNOSIS — I25.10 DISEASE OF CARDIOVASCULAR SYSTEM: ICD-10-CM

## 2019-01-03 DIAGNOSIS — E11.00 TYPE II DIABETES MELLITUS WITH HYPEROSMOLARITY, UNCONTROLLED (HCC): ICD-10-CM

## 2019-01-03 DIAGNOSIS — E78.2 MIXED HYPERLIPIDEMIA: ICD-10-CM

## 2019-01-03 DIAGNOSIS — E66.09 EXOGENOUS OBESITY: ICD-10-CM

## 2019-01-03 DIAGNOSIS — E13.43 DIABETIC AUTONOMIC NEUROPATHY ASSOCIATED WITH OTHER SPECIFIED DIABETES MELLITUS (HCC): Primary | ICD-10-CM

## 2019-01-03 DIAGNOSIS — I10 ESSENTIAL HYPERTENSION, MALIGNANT: ICD-10-CM

## 2019-01-03 DIAGNOSIS — E13.43 DIABETIC AUTONOMIC NEUROPATHY ASSOCIATED WITH OTHER SPECIFIED DIABETES MELLITUS (HCC): ICD-10-CM

## 2019-01-03 DIAGNOSIS — Z79.899 NEED FOR PROPHYLACTIC CHEMOTHERAPY: ICD-10-CM

## 2019-01-03 DIAGNOSIS — E11.65 TYPE II DIABETES MELLITUS WITH HYPEROSMOLARITY, UNCONTROLLED (HCC): ICD-10-CM

## 2019-01-03 LAB
ANION GAP SERPL CALCULATED.3IONS-SCNC: 4 MMOL/L (ref 5–14)
BUN SERPL-MCNC: 26 MG/DL (ref 5–25)
CALCIUM SERPL-MCNC: 9 MG/DL (ref 8.4–10.2)
CHLORIDE SERPL-SCNC: 104 MMOL/L (ref 97–108)
CO2 SERPL-SCNC: 28 MMOL/L (ref 22–30)
CREAT SERPL-MCNC: 1.94 MG/DL (ref 0.7–1.5)
GFR SERPL CREATININE-BSD FRML MDRD: 35 ML/MIN/1.73SQ M
GLUCOSE P FAST SERPL-MCNC: 212 MG/DL (ref 70–99)
POTASSIUM SERPL-SCNC: 4.6 MMOL/L (ref 3.6–5)
SODIUM SERPL-SCNC: 136 MMOL/L (ref 137–147)

## 2019-01-03 PROCEDURE — 80048 BASIC METABOLIC PNL TOTAL CA: CPT

## 2019-01-03 PROCEDURE — 84681 ASSAY OF C-PEPTIDE: CPT

## 2019-01-03 PROCEDURE — 36415 COLL VENOUS BLD VENIPUNCTURE: CPT

## 2019-01-04 LAB — C PEPTIDE SERPL-MCNC: 2.2 NG/ML (ref 1.1–4.4)

## 2019-02-04 ENCOUNTER — APPOINTMENT (OUTPATIENT)
Dept: LAB | Facility: HOSPITAL | Age: 64
End: 2019-02-04
Payer: MEDICARE

## 2019-02-04 ENCOUNTER — TRANSCRIBE ORDERS (OUTPATIENT)
Dept: ADMINISTRATIVE | Facility: HOSPITAL | Age: 64
End: 2019-02-04

## 2019-02-04 DIAGNOSIS — K92.2 GASTRIC HEMORRHAGE: ICD-10-CM

## 2019-02-04 DIAGNOSIS — N13.8 BPH WITH URINARY OBSTRUCTION: ICD-10-CM

## 2019-02-04 DIAGNOSIS — R60.0 LOCALIZED EDEMA: ICD-10-CM

## 2019-02-04 DIAGNOSIS — N18.6 TYPE 1 DIABETES MELLITUS WITH END-STAGE RENAL DISEASE (ESRD) (HCC): ICD-10-CM

## 2019-02-04 DIAGNOSIS — I12.9 PARENCHYMAL RENAL HYPERTENSION, STAGE 1 THROUGH STAGE 4 OR UNSPECIFIED CHRONIC KIDNEY DISEASE: ICD-10-CM

## 2019-02-04 DIAGNOSIS — E55.9 VITAMIN D DEFICIENCY, UNSPECIFIED: ICD-10-CM

## 2019-02-04 DIAGNOSIS — N20.0 URIC ACID NEPHROLITHIASIS: ICD-10-CM

## 2019-02-04 DIAGNOSIS — E78.2 MIXED HYPERLIPIDEMIA: ICD-10-CM

## 2019-02-04 DIAGNOSIS — N40.1 BPH WITH URINARY OBSTRUCTION: ICD-10-CM

## 2019-02-04 DIAGNOSIS — E10.22 TYPE 1 DIABETES MELLITUS WITH END-STAGE RENAL DISEASE (ESRD) (HCC): ICD-10-CM

## 2019-02-04 DIAGNOSIS — N18.30 CHRONIC KIDNEY DISEASE, STAGE III (MODERATE) (HCC): Primary | ICD-10-CM

## 2019-02-04 DIAGNOSIS — N18.30 CHRONIC KIDNEY DISEASE, STAGE III (MODERATE) (HCC): ICD-10-CM

## 2019-02-04 LAB
ALBUMIN SERPL BCP-MCNC: 3.5 G/DL (ref 3–5.2)
ANION GAP SERPL CALCULATED.3IONS-SCNC: 5 MMOL/L (ref 5–14)
BUN SERPL-MCNC: 29 MG/DL (ref 5–25)
CALCIUM SERPL-MCNC: 9 MG/DL (ref 8.4–10.2)
CHLORIDE SERPL-SCNC: 104 MMOL/L (ref 97–108)
CO2 SERPL-SCNC: 27 MMOL/L (ref 22–30)
CREAT SERPL-MCNC: 1.9 MG/DL (ref 0.7–1.5)
CREAT UR-MCNC: 78.1 MG/DL
EOSINOPHIL # BLD AUTO: 0.09 THOUSAND/UL (ref 0–0.4)
EOSINOPHIL NFR BLD MANUAL: 2 % (ref 0–6)
ERYTHROCYTE [DISTWIDTH] IN BLOOD BY AUTOMATED COUNT: 15.1 %
GFR SERPL CREATININE-BSD FRML MDRD: 36 ML/MIN/1.73SQ M
GLUCOSE P FAST SERPL-MCNC: 143 MG/DL (ref 70–99)
HCT VFR BLD AUTO: 40.8 % (ref 41–53)
HGB BLD-MCNC: 13 G/DL (ref 13.5–17.5)
LYMPHOCYTES # BLD AUTO: 1.74 THOUSAND/UL (ref 0.5–4)
LYMPHOCYTES # BLD AUTO: 37 % (ref 25–45)
MCH RBC QN AUTO: 25.3 PG (ref 26–34)
MCHC RBC AUTO-ENTMCNC: 31.7 G/DL (ref 31–36)
MCV RBC AUTO: 80 FL (ref 80–100)
MONOCYTES # BLD AUTO: 0.47 THOUSAND/UL (ref 0.2–0.9)
MONOCYTES NFR BLD AUTO: 10 % (ref 1–10)
NEUTS SEG # BLD: 2.4 THOUSAND/UL (ref 1.8–7.8)
NEUTS SEG NFR BLD AUTO: 51 %
PHOSPHATE SERPL-MCNC: 3.6 MG/DL (ref 2.5–4.8)
PLATELET # BLD AUTO: 141 THOUSANDS/UL (ref 150–450)
PLATELET BLD QL SMEAR: ADEQUATE
PMV BLD AUTO: 8.8 FL (ref 8.9–12.7)
POTASSIUM SERPL-SCNC: 4.4 MMOL/L (ref 3.6–5)
PROT UR-MCNC: 11 MG/DL
PROT/CREAT UR: 0.14 MG/G{CREAT} (ref 0–0.1)
RBC # BLD AUTO: 5.13 MILLION/UL (ref 4.5–5.9)
RBC MORPH BLD: NORMAL
SODIUM SERPL-SCNC: 136 MMOL/L (ref 137–147)
TOTAL CELLS COUNTED SPEC: 100
WBC # BLD AUTO: 4.7 THOUSAND/UL (ref 4.5–11)

## 2019-02-04 PROCEDURE — 85007 BL SMEAR W/DIFF WBC COUNT: CPT

## 2019-02-04 PROCEDURE — 82570 ASSAY OF URINE CREATININE: CPT | Performed by: INTERNAL MEDICINE

## 2019-02-04 PROCEDURE — 36415 COLL VENOUS BLD VENIPUNCTURE: CPT

## 2019-02-04 PROCEDURE — 80069 RENAL FUNCTION PANEL: CPT

## 2019-02-04 PROCEDURE — 85027 COMPLETE CBC AUTOMATED: CPT

## 2019-02-04 PROCEDURE — 84156 ASSAY OF PROTEIN URINE: CPT | Performed by: INTERNAL MEDICINE

## 2019-04-23 ENCOUNTER — LAB (OUTPATIENT)
Dept: LAB | Facility: HOSPITAL | Age: 64
End: 2019-04-23
Payer: MEDICARE

## 2019-04-23 ENCOUNTER — TRANSCRIBE ORDERS (OUTPATIENT)
Dept: ADMINISTRATIVE | Facility: HOSPITAL | Age: 64
End: 2019-04-23

## 2019-04-23 DIAGNOSIS — I25.119 ATHEROSCLEROSIS OF NATIVE CORONARY ARTERY WITH ANGINA PECTORIS, UNSPECIFIED WHETHER NATIVE OR TRANSPLANTED HEART (HCC): ICD-10-CM

## 2019-04-23 DIAGNOSIS — Z79.4 ENCOUNTER FOR LONG-TERM (CURRENT) USE OF INSULIN (HCC): ICD-10-CM

## 2019-04-23 DIAGNOSIS — N18.6 TYPE 2 DIABETES MELLITUS WITH ESRD (END-STAGE RENAL DISEASE) (HCC): ICD-10-CM

## 2019-04-23 DIAGNOSIS — E11.22 TYPE 2 DIABETES MELLITUS WITH ESRD (END-STAGE RENAL DISEASE) (HCC): ICD-10-CM

## 2019-04-23 DIAGNOSIS — E66.09 EXOGENOUS OBESITY: ICD-10-CM

## 2019-04-23 DIAGNOSIS — E55.9 AVITAMINOSIS D: ICD-10-CM

## 2019-04-23 DIAGNOSIS — E16.2 HYPOGLYCEMIA, UNSPECIFIED: ICD-10-CM

## 2019-04-23 DIAGNOSIS — E11.22 TYPE 2 DIABETES MELLITUS WITH ESRD (END-STAGE RENAL DISEASE) (HCC): Primary | ICD-10-CM

## 2019-04-23 DIAGNOSIS — N18.6 TYPE 2 DIABETES MELLITUS WITH ESRD (END-STAGE RENAL DISEASE) (HCC): Primary | ICD-10-CM

## 2019-04-23 LAB
25(OH)D3 SERPL-MCNC: 32 NG/ML (ref 30–100)
ALBUMIN SERPL BCP-MCNC: 3.6 G/DL (ref 3–5.2)
ALP SERPL-CCNC: 131 U/L (ref 43–122)
ALT SERPL W P-5'-P-CCNC: 30 U/L (ref 9–52)
ANION GAP SERPL CALCULATED.3IONS-SCNC: 7 MMOL/L (ref 5–14)
ANISOCYTOSIS BLD QL SMEAR: PRESENT
AST SERPL W P-5'-P-CCNC: 34 U/L (ref 17–59)
BASOPHILS # BLD AUTO: 0.05 THOUSAND/UL (ref 0–0.1)
BASOPHILS NFR MAR MANUAL: 1 % (ref 0–1)
BILIRUB SERPL-MCNC: 0.5 MG/DL
BUN SERPL-MCNC: 32 MG/DL (ref 5–25)
CALCIUM SERPL-MCNC: 9.2 MG/DL (ref 8.4–10.2)
CHLORIDE SERPL-SCNC: 104 MMOL/L (ref 97–108)
CHOLEST SERPL-MCNC: 153 MG/DL
CO2 SERPL-SCNC: 26 MMOL/L (ref 22–30)
CREAT SERPL-MCNC: 1.96 MG/DL (ref 0.7–1.5)
CREAT UR-MCNC: 67.2 MG/DL
EOSINOPHIL # BLD AUTO: 0.23 THOUSAND/UL (ref 0–0.4)
EOSINOPHIL NFR BLD MANUAL: 5 % (ref 0–6)
ERYTHROCYTE [DISTWIDTH] IN BLOOD BY AUTOMATED COUNT: 15.7 %
EST. AVERAGE GLUCOSE BLD GHB EST-MCNC: 174 MG/DL
GFR SERPL CREATININE-BSD FRML MDRD: 35 ML/MIN/1.73SQ M
GLUCOSE P FAST SERPL-MCNC: 139 MG/DL (ref 70–99)
HBA1C MFR BLD: 7.7 % (ref 4.2–6.3)
HCT VFR BLD AUTO: 40.4 % (ref 41–53)
HDLC SERPL-MCNC: 41 MG/DL (ref 40–59)
HGB BLD-MCNC: 13 G/DL (ref 13.5–17.5)
LDLC SERPL CALC-MCNC: 79 MG/DL
LYMPHOCYTES # BLD AUTO: 1.8 THOUSAND/UL (ref 0.5–4)
LYMPHOCYTES # BLD AUTO: 40 % (ref 25–45)
MAGNESIUM SERPL-MCNC: 1.9 MG/DL (ref 1.6–2.3)
MCH RBC QN AUTO: 25.6 PG (ref 26–34)
MCHC RBC AUTO-ENTMCNC: 32.2 G/DL (ref 31–36)
MCV RBC AUTO: 80 FL (ref 80–100)
MICROALBUMIN UR-MCNC: 18 MG/L (ref 0–20)
MICROALBUMIN/CREAT 24H UR: 27 MG/G CREATININE (ref 0–30)
MICROCYTES BLD QL AUTO: PRESENT
MONOCYTES # BLD AUTO: 0.59 THOUSAND/UL (ref 0.2–0.9)
MONOCYTES NFR BLD AUTO: 13 % (ref 1–10)
NEUTS SEG # BLD: 1.85 THOUSAND/UL (ref 1.8–7.8)
NEUTS SEG NFR BLD AUTO: 41 %
NONHDLC SERPL-MCNC: 112 MG/DL
PLATELET # BLD AUTO: 125 THOUSANDS/UL (ref 150–450)
PLATELET BLD QL SMEAR: ABNORMAL
PMV BLD AUTO: 8.4 FL (ref 8.9–12.7)
POTASSIUM SERPL-SCNC: 4.7 MMOL/L (ref 3.6–5)
PROT SERPL-MCNC: 6.9 G/DL (ref 5.9–8.4)
PTH-INTACT SERPL-MCNC: 60.1 PG/ML (ref 16.7–78.9)
RBC # BLD AUTO: 5.07 MILLION/UL (ref 4.5–5.9)
RBC MORPH BLD: PRESENT
SODIUM SERPL-SCNC: 137 MMOL/L (ref 137–147)
TOTAL CELLS COUNTED SPEC: 100
TRIGL SERPL-MCNC: 166 MG/DL
WBC # BLD AUTO: 4.5 THOUSAND/UL (ref 4.5–11)

## 2019-04-23 PROCEDURE — 80061 LIPID PANEL: CPT

## 2019-04-23 PROCEDURE — 82570 ASSAY OF URINE CREATININE: CPT | Performed by: INTERNAL MEDICINE

## 2019-04-23 PROCEDURE — 82306 VITAMIN D 25 HYDROXY: CPT

## 2019-04-23 PROCEDURE — 36415 COLL VENOUS BLD VENIPUNCTURE: CPT

## 2019-04-23 PROCEDURE — 80053 COMPREHEN METABOLIC PANEL: CPT

## 2019-04-23 PROCEDURE — 85007 BL SMEAR W/DIFF WBC COUNT: CPT

## 2019-04-23 PROCEDURE — 82043 UR ALBUMIN QUANTITATIVE: CPT | Performed by: INTERNAL MEDICINE

## 2019-04-23 PROCEDURE — 83970 ASSAY OF PARATHORMONE: CPT

## 2019-04-23 PROCEDURE — 85027 COMPLETE CBC AUTOMATED: CPT

## 2019-04-23 PROCEDURE — 83036 HEMOGLOBIN GLYCOSYLATED A1C: CPT

## 2019-04-23 PROCEDURE — 83735 ASSAY OF MAGNESIUM: CPT

## 2019-04-30 ENCOUNTER — HOSPITAL ENCOUNTER (EMERGENCY)
Facility: HOSPITAL | Age: 64
Discharge: HOME | End: 2019-04-30
Attending: EMERGENCY MEDICINE | Admitting: EMERGENCY MEDICINE
Payer: MEDICARE

## 2019-04-30 ENCOUNTER — APPOINTMENT (EMERGENCY)
Dept: CT IMAGING | Facility: HOSPITAL | Age: 64
End: 2019-04-30
Payer: MEDICARE

## 2019-04-30 VITALS
RESPIRATION RATE: 18 BRPM | WEIGHT: 200.4 LBS | HEART RATE: 55 BPM | TEMPERATURE: 97.7 F | HEIGHT: 65 IN | DIASTOLIC BLOOD PRESSURE: 62 MMHG | OXYGEN SATURATION: 95 % | SYSTOLIC BLOOD PRESSURE: 139 MMHG | BODY MASS INDEX: 33.39 KG/M2

## 2019-04-30 DIAGNOSIS — R19.8 PEPTIC ULCER SYMPTOMS: ICD-10-CM

## 2019-04-30 DIAGNOSIS — R10.13 EPIGASTRIC PAIN: ICD-10-CM

## 2019-04-30 DIAGNOSIS — K29.00 ACUTE GASTRITIS: Primary | ICD-10-CM

## 2019-04-30 LAB
ALBUMIN SERPL BCP-MCNC: 2.8 G/DL (ref 3.5–5)
ALP SERPL-CCNC: 147 U/L (ref 46–116)
ALT SERPL W P-5'-P-CCNC: 40 U/L (ref 12–78)
ANION GAP SERPL CALCULATED.3IONS-SCNC: 4 MMOL/L (ref 4–13)
AST SERPL W P-5'-P-CCNC: 30 U/L (ref 5–45)
BACTERIA UR QL AUTO: ABNORMAL /HPF
BASOPHILS # BLD AUTO: 0.03 THOUSANDS/ΜL (ref 0–0.1)
BASOPHILS NFR BLD AUTO: 1 % (ref 0–1)
BILIRUB SERPL-MCNC: 0.4 MG/DL (ref 0.2–1)
BILIRUB UR QL STRIP: NEGATIVE
BUN SERPL-MCNC: 31 MG/DL (ref 5–25)
CALCIUM SERPL-MCNC: 8.8 MG/DL (ref 8.3–10.1)
CHLORIDE SERPL-SCNC: 106 MMOL/L (ref 100–108)
CLARITY UR: CLEAR
CO2 SERPL-SCNC: 28 MMOL/L (ref 21–32)
COLOR UR: YELLOW
CREAT SERPL-MCNC: 2.06 MG/DL (ref 0.6–1.3)
EOSINOPHIL # BLD AUTO: 0.07 THOUSAND/ΜL (ref 0–0.61)
EOSINOPHIL NFR BLD AUTO: 2 % (ref 0–6)
ERYTHROCYTE [DISTWIDTH] IN BLOOD BY AUTOMATED COUNT: 15.4 % (ref 11.6–15.1)
GFR SERPL CREATININE-BSD FRML MDRD: 33 ML/MIN/1.73SQ M
GLUCOSE SERPL-MCNC: 165 MG/DL (ref 65–140)
GLUCOSE UR STRIP-MCNC: NEGATIVE MG/DL
HCT VFR BLD AUTO: 38.9 % (ref 36.5–49.3)
HGB BLD-MCNC: 12.7 G/DL (ref 12–17)
HGB UR QL STRIP.AUTO: NEGATIVE
IMM GRANULOCYTES # BLD AUTO: 0.02 THOUSAND/UL (ref 0–0.2)
IMM GRANULOCYTES NFR BLD AUTO: 1 % (ref 0–2)
KETONES UR STRIP-MCNC: NEGATIVE MG/DL
LEUKOCYTE ESTERASE UR QL STRIP: ABNORMAL
LIPASE SERPL-CCNC: 87 U/L (ref 73–393)
LYMPHOCYTES # BLD AUTO: 1.43 THOUSANDS/ΜL (ref 0.6–4.47)
LYMPHOCYTES NFR BLD AUTO: 34 % (ref 14–44)
MCH RBC QN AUTO: 26.1 PG (ref 26.8–34.3)
MCHC RBC AUTO-ENTMCNC: 32.6 G/DL (ref 31.4–37.4)
MCV RBC AUTO: 80 FL (ref 82–98)
MONOCYTES # BLD AUTO: 0.56 THOUSAND/ΜL (ref 0.17–1.22)
MONOCYTES NFR BLD AUTO: 13 % (ref 4–12)
NEUTROPHILS # BLD AUTO: 2.08 THOUSANDS/ΜL (ref 1.85–7.62)
NEUTS SEG NFR BLD AUTO: 49 % (ref 43–75)
NITRITE UR QL STRIP: NEGATIVE
NON-SQ EPI CELLS URNS QL MICRO: ABNORMAL /HPF
NRBC BLD AUTO-RTO: 0 /100 WBCS
PH UR STRIP.AUTO: 5.5 [PH] (ref 4.5–8)
PLATELET # BLD AUTO: 132 THOUSANDS/UL (ref 149–390)
PMV BLD AUTO: 10.2 FL (ref 8.9–12.7)
POTASSIUM SERPL-SCNC: 5.2 MMOL/L (ref 3.5–5.3)
PROT SERPL-MCNC: 6.7 G/DL (ref 6.4–8.2)
PROT UR STRIP-MCNC: NEGATIVE MG/DL
RBC # BLD AUTO: 4.86 MILLION/UL (ref 3.88–5.62)
RBC #/AREA URNS AUTO: ABNORMAL /HPF
SODIUM SERPL-SCNC: 138 MMOL/L (ref 136–145)
SP GR UR STRIP.AUTO: 1.01 (ref 1–1.03)
TROPONIN I SERPL-MCNC: <0.02 NG/ML
TROPONIN I SERPL-MCNC: <0.02 NG/ML
UROBILINOGEN UR QL STRIP.AUTO: 0.2 E.U./DL
WBC # BLD AUTO: 4.19 THOUSAND/UL (ref 4.31–10.16)
WBC #/AREA URNS AUTO: ABNORMAL /HPF

## 2019-04-30 PROCEDURE — 84484 ASSAY OF TROPONIN QUANT: CPT | Performed by: EMERGENCY MEDICINE

## 2019-04-30 PROCEDURE — 85025 COMPLETE CBC W/AUTO DIFF WBC: CPT | Performed by: EMERGENCY MEDICINE

## 2019-04-30 PROCEDURE — 36415 COLL VENOUS BLD VENIPUNCTURE: CPT | Performed by: EMERGENCY MEDICINE

## 2019-04-30 PROCEDURE — 96361 HYDRATE IV INFUSION ADD-ON: CPT

## 2019-04-30 PROCEDURE — 74176 CT ABD & PELVIS W/O CONTRAST: CPT

## 2019-04-30 PROCEDURE — C9113 INJ PANTOPRAZOLE SODIUM, VIA: HCPCS | Performed by: EMERGENCY MEDICINE

## 2019-04-30 PROCEDURE — 83690 ASSAY OF LIPASE: CPT | Performed by: EMERGENCY MEDICINE

## 2019-04-30 PROCEDURE — 96375 TX/PRO/DX INJ NEW DRUG ADDON: CPT

## 2019-04-30 PROCEDURE — 96374 THER/PROPH/DIAG INJ IV PUSH: CPT

## 2019-04-30 PROCEDURE — 81001 URINALYSIS AUTO W/SCOPE: CPT

## 2019-04-30 PROCEDURE — 99284 EMERGENCY DEPT VISIT MOD MDM: CPT

## 2019-04-30 PROCEDURE — 99285 EMERGENCY DEPT VISIT HI MDM: CPT | Performed by: EMERGENCY MEDICINE

## 2019-04-30 PROCEDURE — 93005 ELECTROCARDIOGRAM TRACING: CPT

## 2019-04-30 PROCEDURE — 80053 COMPREHEN METABOLIC PANEL: CPT | Performed by: EMERGENCY MEDICINE

## 2019-04-30 RX ORDER — SUCRALFATE ORAL 1 G/10ML
1000 SUSPENSION ORAL ONCE
Status: COMPLETED | OUTPATIENT
Start: 2019-04-30 | End: 2019-04-30

## 2019-04-30 RX ORDER — PANTOPRAZOLE SODIUM 40 MG/1
40 TABLET, DELAYED RELEASE ORAL 2 TIMES DAILY
Qty: 60 TABLET | Refills: 0 | Status: SHIPPED | OUTPATIENT
Start: 2019-04-30 | End: 2021-01-01 | Stop reason: HOSPADM

## 2019-04-30 RX ORDER — ONDANSETRON 2 MG/ML
4 INJECTION INTRAMUSCULAR; INTRAVENOUS ONCE
Status: COMPLETED | OUTPATIENT
Start: 2019-04-30 | End: 2019-04-30

## 2019-04-30 RX ORDER — FENTANYL CITRATE 50 UG/ML
50 INJECTION, SOLUTION INTRAMUSCULAR; INTRAVENOUS ONCE
Status: COMPLETED | OUTPATIENT
Start: 2019-04-30 | End: 2019-04-30

## 2019-04-30 RX ORDER — SUCRALFATE ORAL 1 G/10ML
1 SUSPENSION ORAL 4 TIMES DAILY
Qty: 420 ML | Refills: 0 | Status: SHIPPED | OUTPATIENT
Start: 2019-04-30 | End: 2021-01-01 | Stop reason: HOSPADM

## 2019-04-30 RX ORDER — PANTOPRAZOLE SODIUM 40 MG/1
40 INJECTION, POWDER, FOR SOLUTION INTRAVENOUS ONCE
Status: COMPLETED | OUTPATIENT
Start: 2019-04-30 | End: 2019-04-30

## 2019-04-30 RX ORDER — MORPHINE SULFATE 4 MG/ML
4 INJECTION, SOLUTION INTRAMUSCULAR; INTRAVENOUS ONCE
Status: DISCONTINUED | OUTPATIENT
Start: 2019-04-30 | End: 2019-04-30

## 2019-04-30 RX ORDER — SUCRALFATE ORAL 1 G/10ML
1 SUSPENSION ORAL 4 TIMES DAILY
Qty: 420 ML | Refills: 0 | Status: SHIPPED | OUTPATIENT
Start: 2019-04-30 | End: 2019-04-30 | Stop reason: SDUPTHER

## 2019-04-30 RX ADMIN — ONDANSETRON 4 MG: 2 INJECTION INTRAMUSCULAR; INTRAVENOUS at 16:44

## 2019-04-30 RX ADMIN — SODIUM CHLORIDE 1000 ML: 0.9 INJECTION, SOLUTION INTRAVENOUS at 16:44

## 2019-04-30 RX ADMIN — SUCRALFATE 1000 MG: 1 SUSPENSION ORAL at 21:02

## 2019-04-30 RX ADMIN — FENTANYL CITRATE 50 MCG: 50 INJECTION, SOLUTION INTRAMUSCULAR; INTRAVENOUS at 16:44

## 2019-04-30 RX ADMIN — IOHEXOL 50 ML: 240 INJECTION, SOLUTION INTRATHECAL; INTRAVASCULAR; INTRAVENOUS; ORAL at 18:01

## 2019-04-30 RX ADMIN — PANTOPRAZOLE SODIUM 40 MG: 40 INJECTION, POWDER, FOR SOLUTION INTRAVENOUS at 21:02

## 2019-05-01 LAB
ATRIAL RATE: 48 BPM
ATRIAL RATE: 49 BPM
P AXIS: 45 DEGREES
P AXIS: 58 DEGREES
PR INTERVAL: 176 MS
PR INTERVAL: 198 MS
QRS AXIS: 4 DEGREES
QRS AXIS: 5 DEGREES
QRSD INTERVAL: 100 MS
QRSD INTERVAL: 106 MS
QT INTERVAL: 440 MS
QT INTERVAL: 458 MS
QTC INTERVAL: 393 MS
QTC INTERVAL: 413 MS
T WAVE AXIS: 56 DEGREES
T WAVE AXIS: 63 DEGREES
VENTRICULAR RATE: 48 BPM
VENTRICULAR RATE: 49 BPM

## 2019-05-01 PROCEDURE — 93010 ELECTROCARDIOGRAM REPORT: CPT | Performed by: INTERNAL MEDICINE

## 2019-07-01 ENCOUNTER — TELEPHONE (OUTPATIENT)
Dept: HEMATOLOGY ONCOLOGY | Facility: CLINIC | Age: 64
End: 2019-07-01

## 2019-07-01 NOTE — TELEPHONE ENCOUNTER
I called the patient to remind him that he should get his blood work done prior the appointment  He said he will get the labs tomorrow 07/02/19

## 2019-07-02 ENCOUNTER — APPOINTMENT (OUTPATIENT)
Dept: LAB | Facility: HOSPITAL | Age: 64
End: 2019-07-02
Attending: INTERNAL MEDICINE
Payer: MEDICARE

## 2019-07-02 ENCOUNTER — TRANSCRIBE ORDERS (OUTPATIENT)
Dept: ADMINISTRATIVE | Facility: HOSPITAL | Age: 64
End: 2019-07-02

## 2019-07-02 DIAGNOSIS — D50.9 IRON DEFICIENCY ANEMIA, UNSPECIFIED IRON DEFICIENCY ANEMIA TYPE: ICD-10-CM

## 2019-07-02 DIAGNOSIS — D69.6 THROMBOCYTOPENIA, UNSPECIFIED (HCC): ICD-10-CM

## 2019-07-02 DIAGNOSIS — D50.9 IRON DEFICIENCY ANEMIA, UNSPECIFIED IRON DEFICIENCY ANEMIA TYPE: Primary | ICD-10-CM

## 2019-07-02 LAB
ALBUMIN SERPL BCP-MCNC: 3.5 G/DL (ref 3–5.2)
ALP SERPL-CCNC: 155 U/L (ref 43–122)
ALT SERPL W P-5'-P-CCNC: 31 U/L (ref 9–52)
ANION GAP SERPL CALCULATED.3IONS-SCNC: 9 MMOL/L (ref 5–14)
AST SERPL W P-5'-P-CCNC: 35 U/L (ref 17–59)
BILIRUB SERPL-MCNC: 0.4 MG/DL
BUN SERPL-MCNC: 23 MG/DL (ref 5–25)
CALCIUM SERPL-MCNC: 9.2 MG/DL (ref 8.4–10.2)
CHLORIDE SERPL-SCNC: 106 MMOL/L (ref 97–108)
CO2 SERPL-SCNC: 26 MMOL/L (ref 22–30)
CREAT SERPL-MCNC: 1.7 MG/DL (ref 0.7–1.5)
CRP SERPL QL: 13.6 MG/L
EOSINOPHIL # BLD AUTO: 0.16 THOUSAND/UL (ref 0–0.4)
EOSINOPHIL NFR BLD MANUAL: 3 % (ref 0–6)
ERYTHROCYTE [DISTWIDTH] IN BLOOD BY AUTOMATED COUNT: 14.9 %
ERYTHROCYTE [SEDIMENTATION RATE] IN BLOOD: 54 MM/HOUR (ref 1–20)
FERRITIN SERPL-MCNC: 208 NG/ML (ref 8–388)
GFR SERPL CREATININE-BSD FRML MDRD: 41 ML/MIN/1.73SQ M
GLUCOSE P FAST SERPL-MCNC: 93 MG/DL (ref 70–99)
HCT VFR BLD AUTO: 40.3 % (ref 41–53)
HGB BLD-MCNC: 12.9 G/DL (ref 13.5–17.5)
IRON SATN MFR SERPL: 26 %
IRON SERPL-MCNC: 61 UG/DL (ref 65–175)
LYMPHOCYTES # BLD AUTO: 1.84 THOUSAND/UL (ref 0.5–4)
LYMPHOCYTES # BLD AUTO: 34 % (ref 25–45)
MCH RBC QN AUTO: 25.7 PG (ref 26–34)
MCHC RBC AUTO-ENTMCNC: 32.1 G/DL (ref 31–36)
MCV RBC AUTO: 80 FL (ref 80–100)
MONOCYTES # BLD AUTO: 0.38 THOUSAND/UL (ref 0.2–0.9)
MONOCYTES NFR BLD AUTO: 7 % (ref 1–10)
NEUTS BAND NFR BLD MANUAL: 1 % (ref 0–8)
NEUTS SEG # BLD: 3.02 THOUSAND/UL (ref 1.8–7.8)
NEUTS SEG NFR BLD AUTO: 55 %
PLATELET # BLD AUTO: 131 THOUSANDS/UL (ref 150–450)
PLATELET BLD QL SMEAR: ABNORMAL
PMV BLD AUTO: 9.6 FL (ref 8.9–12.7)
POTASSIUM SERPL-SCNC: 4.2 MMOL/L (ref 3.6–5)
PROT SERPL-MCNC: 7.1 G/DL (ref 5.9–8.4)
RBC # BLD AUTO: 5.02 MILLION/UL (ref 4.5–5.9)
RBC MORPH BLD: NORMAL
SODIUM SERPL-SCNC: 141 MMOL/L (ref 137–147)
TIBC SERPL-MCNC: 237 UG/DL (ref 250–450)
TOTAL CELLS COUNTED SPEC: 100
WBC # BLD AUTO: 5.4 THOUSAND/UL (ref 4.5–11)

## 2019-07-02 PROCEDURE — 85027 COMPLETE CBC AUTOMATED: CPT

## 2019-07-02 PROCEDURE — 36415 COLL VENOUS BLD VENIPUNCTURE: CPT

## 2019-07-02 PROCEDURE — 80053 COMPREHEN METABOLIC PANEL: CPT

## 2019-07-02 PROCEDURE — 86140 C-REACTIVE PROTEIN: CPT

## 2019-07-02 PROCEDURE — 82728 ASSAY OF FERRITIN: CPT

## 2019-07-02 PROCEDURE — 83550 IRON BINDING TEST: CPT

## 2019-07-02 PROCEDURE — 85652 RBC SED RATE AUTOMATED: CPT

## 2019-07-02 PROCEDURE — 83540 ASSAY OF IRON: CPT

## 2019-07-02 PROCEDURE — 85007 BL SMEAR W/DIFF WBC COUNT: CPT

## 2019-07-03 ENCOUNTER — OFFICE VISIT (OUTPATIENT)
Dept: HEMATOLOGY ONCOLOGY | Facility: CLINIC | Age: 64
End: 2019-07-03
Payer: MEDICARE

## 2019-07-03 VITALS
HEART RATE: 67 BPM | WEIGHT: 201.8 LBS | BODY MASS INDEX: 33.58 KG/M2 | OXYGEN SATURATION: 97 % | RESPIRATION RATE: 16 BRPM | SYSTOLIC BLOOD PRESSURE: 100 MMHG | TEMPERATURE: 98.9 F | DIASTOLIC BLOOD PRESSURE: 50 MMHG

## 2019-07-03 DIAGNOSIS — D50.9 MICROCYTIC ANEMIA: Primary | ICD-10-CM

## 2019-07-03 DIAGNOSIS — D69.6 THROMBOCYTOPENIA (HCC): ICD-10-CM

## 2019-07-03 PROCEDURE — 99213 OFFICE O/P EST LOW 20 MIN: CPT | Performed by: INTERNAL MEDICINE

## 2019-07-03 NOTE — PROGRESS NOTES
Hematology/Oncology Outpatient Follow-up  Ann Nayak 70 y o  male [id number]    Date:  7/3/2019    Assessment and Plan:  1  Microcytic anemia  The patient seems to have stable mild normocytic/mildly microcytic anemia which is most likely multifactorial in etiology including anemia of chronic disease, anemia of renal dysfunction   - CBC and differential; Future  - Comprehensive metabolic panel; Future  - Iron Panel; Future  - Ferritin; Future  - Vitamin B12; Future    2  Thrombocytopenia ([address] )  His platelet count seems to be stable around 130,000 without any hint of bleeding or easy bruising   - CBC and differential; Future  - Comprehensive metabolic panel; Future  - Iron Panel; Future  - Ferritin; Future  - Vitamin B12; Future      HPI:  The patient came today for a follow-up visit  He has multiple comorbid conditions including type 1 insulin-dependent diabetes mellitus since his early 25s, history of chronic renal dysfunction, hypertension, coronary artery disease status post coronary artery bypass surgery in 2008, history of gastric ulcer      The patient has borderline chronic thrombocytopenia and mild chronic microcytic anemia which where investigated extensively  Interval history:  The patient's most recent blood work from the 2nd of July showed stable hemoglobin level of 12 9 with normal white cells  His platelet count is borderline low at 131 which seems to be stable as well  His renal function showed improvement of the creatinine down to 1 7 with normal liver enzymes  He has elevated C-reactive protein and sed rate  His iron level is within normal range  The patient stated that he lost about 9 lb since last visit due to exercising and healthy dieting  He denied any significant symptoms  ROS: Review of Systems   Constitutional: Positive for appetite change  Negative for diaphoresis, fatigue and fever     HENT: Negative for congestion, dental problem, facial swelling, hearing loss, tinnitus and trouble swallowing  Eyes: Negative for visual disturbance  Respiratory: Negative for cough, chest tightness, shortness of breath and wheezing  Cardiovascular: Negative for chest pain and leg swelling  Gastrointestinal: Negative for abdominal distention, abdominal pain, blood in stool, constipation, diarrhea, nausea and vomiting  Genitourinary: Negative for dysuria, hematuria and urgency  Musculoskeletal: Negative for arthralgias, myalgias and neck pain  Skin: Positive for rash  Negative for color change, pallor and wound  Neurological: Negative for dizziness, weakness, numbness and headaches  Hematological: Negative for adenopathy  Psychiatric/Behavioral: Negative for agitation, behavioral problems, confusion, hallucinations, self-injury and sleep disturbance  The patient is not nervous/anxious and is not hyperactive  Past Medical History:   Diagnosis Date    Chronic kidney disease     GERD (gastroesophageal reflux disease)     Hypertension        History reviewed  No pertinent surgical history      Social History     Socioeconomic History    Marital status: /Civil Union     Spouse name: None    Number of children: None    Years of education: None    Highest education level: None   Occupational History    None   Social Needs    Financial resource strain: None    Food insecurity:     Worry: None     Inability: None    Transportation needs:     Medical: None     Non-medical: None   Tobacco Use    Smoking status: Never Smoker    Smokeless tobacco: Never Used   Substance and Sexual Activity    Alcohol use: No    Drug use: No    Sexual activity: None   Lifestyle    Physical activity:     Days per week: None     Minutes per session: None    Stress: None   Relationships    Social connections:     Talks on phone: None     Gets together: None     Attends Orthodox service: None     Active member of club or organization: None     Attends meetings of clubs or organizations: None     Relationship status: None    Intimate partner violence:     Fear of current or ex partner: None     Emotionally abused: None     Physically abused: None     Forced sexual activity: None   Other Topics Concern    None   Social History Narrative    None       History reviewed  No pertinent family history      Allergies   Allergen Reactions    Calcium Itching         Current Outpatient Medications:     acetaminophen (TYLENOL) 500 mg tablet, Take 500 mg by mouth every 6 (six) hours, Disp: , Rfl:     amLODIPine (NORVASC) 5 mg tablet, Take 5 mg by mouth, Disp: , Rfl:     Artificial Tear Ointment (REFRESH LACRI-LUBE OP), Apply 1 drop to eye 4 (four) times a day, Disp: , Rfl:     atorvastatin (LIPITOR) 80 mg tablet, Take 40 mg by mouth, Disp: , Rfl:     CARLEY MICROLET LANCETS lancets, , Disp: , Rfl:     Cholecalciferol 2000 units CAPS, Take 1 capsule by mouth, Disp: , Rfl:     CONTOUR NEXT TEST test strip, , Disp: , Rfl:     ferrous sulfate 325 (65 Fe) mg tablet, Take 325 mg by mouth, Disp: , Rfl:     furosemide (LASIX) 40 mg tablet, Take 40 mg by mouth, Disp: , Rfl:     ibuprofen (MOTRIN) 800 mg tablet, Take 800 mg by mouth every 6 (six) hours, Disp: , Rfl: 0    insulin aspart (NovoLOG) 100 Units/mL injection pen, Inject under the skin, Disp: , Rfl:     isosorbide mononitrate (IMDUR) 60 mg 24 hr tablet, , Disp: , Rfl:     ketoconazole (NIZORAL) 2 % shampoo, , Disp: , Rfl:     LINZESS 145 MCG CAPS, , Disp: , Rfl:     metoclopramide (REGLAN) 10 mg tablet, , Disp: , Rfl:     Multiple Vitamin (DAILY-CRISS) TABS, , Disp: , Rfl:     NOVOLOG 100 UNIT/ML injection, , Disp: , Rfl:     pantoprazole (PROTONIX) 40 mg tablet, Take 40 mg by mouth, Disp: , Rfl:     pantoprazole (PROTONIX) 40 mg tablet, Take 1 tablet (40 mg total) by mouth 2 (two) times a day, Disp: 60 tablet, Rfl: 0    paricalcitol (ZEMPLAR) 1 mcg capsule, Take 1 mcg by mouth, Disp: , Rfl:     Polyethylene Glycol 1000 POWD, Take 17 g by mouth, Disp: , Rfl:     Psyllium 400 MG CAPS, Take 0 4 g by mouth, Disp: , Rfl:     sucralfate (CARAFATE) 1 g/10 mL suspension, Take 10 mL (1 g total) by mouth 4 (four) times a day, Disp: 420 mL, Rfl: 0    TRADJENTA 5 MG TABS, , Disp: , Rfl:       Physical Exam:  /50 (BP Location: Left arm, Cuff Size: Adult)   Pulse 67   Temp 98 9 °F (37 2 °C) (Tympanic)   Resp 16   Wt 91 5 kg (201 lb 12 8 oz)   SpO2 97%   BMI 33 58 kg/m²     Physical Exam   Constitutional: He is oriented to person, place, and time  He appears well-developed and well-nourished  HENT:   Head: Normocephalic and atraumatic  Nose: Nose normal    Mouth/Throat: Oropharynx is clear and moist    Eyes: Pupils are equal, round, and reactive to light  Conjunctivae and EOM are normal  Right eye exhibits no discharge  Left eye exhibits no discharge  No scleral icterus  Neck: Normal range of motion  Neck supple  No tracheal deviation present  No thyromegaly present  Cardiovascular: Normal rate, regular rhythm and normal heart sounds  Exam reveals no friction rub  No murmur heard  Pulmonary/Chest: Effort normal and breath sounds normal  No respiratory distress  He has no wheezes  He has no rales  He exhibits no tenderness  Abdominal: Soft  Bowel sounds are normal  He exhibits no distension and no mass  There is no hepatosplenomegaly, splenomegaly or hepatomegaly  There is no tenderness  There is no rebound and no guarding  Musculoskeletal: Normal range of motion  He exhibits no edema, tenderness or deformity  Lymphadenopathy:     He has no cervical adenopathy  Neurological: He is alert and oriented to person, place, and time  He has normal reflexes  He displays normal reflexes  No cranial nerve deficit  Coordination normal    Skin: Skin is warm and dry  No rash noted  No erythema  No pallor  Psychiatric: He has a normal mood and affect   His behavior is normal  Judgment and thought content normal  Labs:  Lab Results   Component Value Date    WBC 5 40 07/02/2019    HGB 12 9 (L) 07/02/2019    HCT 40 3 (L) 07/02/2019    MCV 80 07/02/2019     (L) 07/02/2019     Lab Results   Component Value Date     06/21/2018    K 4 2 07/02/2019     07/02/2019    CO2 26 07/02/2019    ANIONGAP 8 06/21/2018    BUN 23 07/02/2019    CREATININE 1 70 (H) 07/02/2019    GLUCOSE 54 (L) 06/21/2018    GLUF 93 07/02/2019    CALCIUM 9 2 07/02/2019    AST 35 07/02/2019    ALT 31 07/02/2019    ALKPHOS 155 (H) 07/02/2019    PROT 7 0 06/21/2018    BILITOT 0 4 06/21/2018    EGFR 41 (L) 07/02/2019       Patient voiced understanding and agreement in the above discussion  Aware to contact our office with questions/symptoms in the interim

## 2019-07-15 ENCOUNTER — TRANSCRIBE ORDERS (OUTPATIENT)
Dept: ADMINISTRATIVE | Facility: HOSPITAL | Age: 64
End: 2019-07-15

## 2019-07-15 ENCOUNTER — APPOINTMENT (OUTPATIENT)
Dept: LAB | Facility: HOSPITAL | Age: 64
End: 2019-07-15
Payer: MEDICARE

## 2019-07-15 DIAGNOSIS — N20.0 KIDNEY STONE: ICD-10-CM

## 2019-07-15 DIAGNOSIS — I12.0 PARENCHYMAL RENAL HYPERTENSION, STAGE 5 CHRONIC KIDNEY DISEASE OR END STAGE RENAL DISEASE (HCC): ICD-10-CM

## 2019-07-15 DIAGNOSIS — N25.81 SECONDARY HYPERPARATHYROIDISM OF RENAL ORIGIN (HCC): ICD-10-CM

## 2019-07-15 DIAGNOSIS — E55.9 VITAMIN D DEFICIENCY, UNSPECIFIED: ICD-10-CM

## 2019-07-15 DIAGNOSIS — E78.2 MIXED HYPERLIPIDEMIA: ICD-10-CM

## 2019-07-15 DIAGNOSIS — N18.6 TYPE 1 DIABETES MELLITUS WITH END-STAGE RENAL DISEASE (ESRD) (HCC): ICD-10-CM

## 2019-07-15 DIAGNOSIS — N18.30 CHRONIC KIDNEY DISEASE, STAGE III (MODERATE) (HCC): Primary | ICD-10-CM

## 2019-07-15 DIAGNOSIS — I25.10 CORONARY ARTERIOSCLEROSIS: ICD-10-CM

## 2019-07-15 DIAGNOSIS — E10.22 TYPE 1 DIABETES MELLITUS WITH END-STAGE RENAL DISEASE (ESRD) (HCC): ICD-10-CM

## 2019-07-15 DIAGNOSIS — D50.9 IRON DEFICIENCY ANEMIA, UNSPECIFIED IRON DEFICIENCY ANEMIA TYPE: ICD-10-CM

## 2019-07-15 DIAGNOSIS — N18.30 CHRONIC KIDNEY DISEASE, STAGE III (MODERATE) (HCC): ICD-10-CM

## 2019-07-15 LAB
ALBUMIN SERPL BCP-MCNC: 3.5 G/DL (ref 3–5.2)
ANION GAP SERPL CALCULATED.3IONS-SCNC: 8 MMOL/L (ref 5–14)
BUN SERPL-MCNC: 28 MG/DL (ref 5–25)
CALCIUM SERPL-MCNC: 9 MG/DL (ref 8.4–10.2)
CHLORIDE SERPL-SCNC: 107 MMOL/L (ref 97–108)
CHOLEST SERPL-MCNC: 143 MG/DL
CO2 SERPL-SCNC: 24 MMOL/L (ref 22–30)
CREAT SERPL-MCNC: 1.85 MG/DL (ref 0.7–1.5)
CREAT UR-MCNC: 119 MG/DL
GFR SERPL CREATININE-BSD FRML MDRD: 37 ML/MIN/1.73SQ M
GLUCOSE P FAST SERPL-MCNC: 126 MG/DL (ref 70–99)
HCT VFR BLD AUTO: 40.9 % (ref 41–53)
HDLC SERPL-MCNC: 46 MG/DL (ref 40–59)
HGB BLD-MCNC: 13.4 G/DL (ref 13.5–17.5)
LDLC SERPL CALC-MCNC: 68 MG/DL
MICROALBUMIN UR-MCNC: 23.1 MG/L (ref 0–20)
MICROALBUMIN/CREAT 24H UR: 19 MG/G CREATININE (ref 0–30)
NONHDLC SERPL-MCNC: 97 MG/DL
PHOSPHATE SERPL-MCNC: 4.2 MG/DL (ref 2.5–4.8)
POTASSIUM SERPL-SCNC: 4.2 MMOL/L (ref 3.6–5)
PTH-INTACT SERPL-MCNC: 115.6 PG/ML (ref 16.7–78.9)
SODIUM SERPL-SCNC: 139 MMOL/L (ref 137–147)
TRIGL SERPL-MCNC: 145 MG/DL
URATE SERPL-MCNC: 8.9 MG/DL (ref 3.5–8.5)

## 2019-07-15 PROCEDURE — 82043 UR ALBUMIN QUANTITATIVE: CPT | Performed by: INTERNAL MEDICINE

## 2019-07-15 PROCEDURE — 80061 LIPID PANEL: CPT

## 2019-07-15 PROCEDURE — 84550 ASSAY OF BLOOD/URIC ACID: CPT

## 2019-07-15 PROCEDURE — 80069 RENAL FUNCTION PANEL: CPT

## 2019-07-15 PROCEDURE — 36415 COLL VENOUS BLD VENIPUNCTURE: CPT

## 2019-07-15 PROCEDURE — 85014 HEMATOCRIT: CPT

## 2019-07-15 PROCEDURE — 85018 HEMOGLOBIN: CPT

## 2019-07-15 PROCEDURE — 83970 ASSAY OF PARATHORMONE: CPT

## 2019-07-15 PROCEDURE — 82570 ASSAY OF URINE CREATININE: CPT | Performed by: INTERNAL MEDICINE

## 2019-08-05 ENCOUNTER — TRANSCRIBE ORDERS (OUTPATIENT)
Dept: ADMINISTRATIVE | Facility: HOSPITAL | Age: 64
End: 2019-08-05

## 2019-08-05 ENCOUNTER — APPOINTMENT (OUTPATIENT)
Dept: LAB | Facility: HOSPITAL | Age: 64
End: 2019-08-05
Payer: MEDICARE

## 2019-08-05 DIAGNOSIS — Z79.899 ENCOUNTER FOR LONG-TERM (CURRENT) USE OF OTHER MEDICATIONS: ICD-10-CM

## 2019-08-05 DIAGNOSIS — N18.6 TYPE 2 DIABETES MELLITUS WITH ESRD (END-STAGE RENAL DISEASE) (HCC): ICD-10-CM

## 2019-08-05 DIAGNOSIS — E55.9 AVITAMINOSIS D: ICD-10-CM

## 2019-08-05 DIAGNOSIS — E66.09 EXOGENOUS OBESITY: Primary | ICD-10-CM

## 2019-08-05 DIAGNOSIS — Z79.4 ENCOUNTER FOR LONG-TERM (CURRENT) USE OF INSULIN (HCC): ICD-10-CM

## 2019-08-05 DIAGNOSIS — Z96.41 INSULIN PUMP STATUS: ICD-10-CM

## 2019-08-05 DIAGNOSIS — E11.22 TYPE 2 DIABETES MELLITUS WITH ESRD (END-STAGE RENAL DISEASE) (HCC): ICD-10-CM

## 2019-08-05 DIAGNOSIS — I25.10 ATHEROSCLEROSIS OF NATIVE CORONARY ARTERY WITHOUT ANGINA PECTORIS, UNSPECIFIED WHETHER NATIVE OR TRANSPLANTED HEART: ICD-10-CM

## 2019-08-05 DIAGNOSIS — I10 HYPERTENSION, ESSENTIAL: ICD-10-CM

## 2019-08-05 DIAGNOSIS — E66.09 EXOGENOUS OBESITY: ICD-10-CM

## 2019-08-05 DIAGNOSIS — R53.83 FATIGUE, UNSPECIFIED TYPE: ICD-10-CM

## 2019-08-05 DIAGNOSIS — E78.2 MIXED HYPERLIPIDEMIA: ICD-10-CM

## 2019-08-05 LAB
ALBUMIN SERPL BCP-MCNC: 3.5 G/DL (ref 3–5.2)
ALP SERPL-CCNC: 147 U/L (ref 43–122)
ALT SERPL W P-5'-P-CCNC: 40 U/L (ref 9–52)
ANION GAP SERPL CALCULATED.3IONS-SCNC: 4 MMOL/L (ref 5–14)
AST SERPL W P-5'-P-CCNC: 34 U/L (ref 17–59)
BILIRUB SERPL-MCNC: 0.5 MG/DL
BUN SERPL-MCNC: 29 MG/DL (ref 5–25)
CALCIUM SERPL-MCNC: 9.2 MG/DL (ref 8.4–10.2)
CHLORIDE SERPL-SCNC: 105 MMOL/L (ref 97–108)
CO2 SERPL-SCNC: 28 MMOL/L (ref 22–30)
CREAT SERPL-MCNC: 1.71 MG/DL (ref 0.7–1.5)
EOSINOPHIL # BLD AUTO: 0.14 THOUSAND/UL (ref 0–0.4)
EOSINOPHIL NFR BLD MANUAL: 3 % (ref 0–6)
ERYTHROCYTE [DISTWIDTH] IN BLOOD BY AUTOMATED COUNT: 15.4 %
EST. AVERAGE GLUCOSE BLD GHB EST-MCNC: 180 MG/DL
GFR SERPL CREATININE-BSD FRML MDRD: 41 ML/MIN/1.73SQ M
GLUCOSE P FAST SERPL-MCNC: 142 MG/DL (ref 70–99)
HBA1C MFR BLD: 7.9 % (ref 4.2–6.3)
HCT VFR BLD AUTO: 41.4 % (ref 41–53)
HGB BLD-MCNC: 13.3 G/DL (ref 13.5–17.5)
LYMPHOCYTES # BLD AUTO: 1.55 THOUSAND/UL (ref 0.5–4)
LYMPHOCYTES # BLD AUTO: 33 % (ref 25–45)
MCH RBC QN AUTO: 25.8 PG (ref 26–34)
MCHC RBC AUTO-ENTMCNC: 32.2 G/DL (ref 31–36)
MCV RBC AUTO: 80 FL (ref 80–100)
MONOCYTES # BLD AUTO: 0.66 THOUSAND/UL (ref 0.2–0.9)
MONOCYTES NFR BLD AUTO: 14 % (ref 1–10)
NEUTS SEG # BLD: 2.35 THOUSAND/UL (ref 1.8–7.8)
NEUTS SEG NFR BLD AUTO: 50 %
PLATELET # BLD AUTO: 123 THOUSANDS/UL (ref 150–450)
PLATELET BLD QL SMEAR: ABNORMAL
PMV BLD AUTO: 8.9 FL (ref 8.9–12.7)
POTASSIUM SERPL-SCNC: 4.4 MMOL/L (ref 3.6–5)
PROT SERPL-MCNC: 7.2 G/DL (ref 5.9–8.4)
PSA SERPL-MCNC: 1.7 NG/ML (ref 0–4)
RBC # BLD AUTO: 5.17 MILLION/UL (ref 4.5–5.9)
RBC MORPH BLD: NORMAL
SODIUM SERPL-SCNC: 137 MMOL/L (ref 137–147)
TOTAL CELLS COUNTED SPEC: 100
WBC # BLD AUTO: 4.7 THOUSAND/UL (ref 4.5–11)

## 2019-08-05 PROCEDURE — 85007 BL SMEAR W/DIFF WBC COUNT: CPT

## 2019-08-05 PROCEDURE — G0103 PSA SCREENING: HCPCS

## 2019-08-05 PROCEDURE — 83036 HEMOGLOBIN GLYCOSYLATED A1C: CPT | Performed by: FAMILY MEDICINE

## 2019-08-05 PROCEDURE — 36415 COLL VENOUS BLD VENIPUNCTURE: CPT | Performed by: FAMILY MEDICINE

## 2019-08-05 PROCEDURE — 80053 COMPREHEN METABOLIC PANEL: CPT

## 2019-08-05 PROCEDURE — 85027 COMPLETE CBC AUTOMATED: CPT

## 2019-09-17 PROBLEM — J40 BRONCHITIS: Status: ACTIVE | Noted: 2019-09-17

## 2019-09-17 PROBLEM — R42 VERTIGO: Status: ACTIVE | Noted: 2019-09-17

## 2019-09-17 PROBLEM — J06.9 UPPER RESPIRATORY TRACT INFECTION: Status: ACTIVE | Noted: 2019-09-17

## 2019-09-17 PROBLEM — I73.9 PERIPHERAL VASCULAR DISEASE, UNSPECIFIED (HCC): Status: ACTIVE | Noted: 2019-09-17

## 2019-12-07 ENCOUNTER — APPOINTMENT (OUTPATIENT)
Dept: LAB | Facility: HOSPITAL | Age: 64
End: 2019-12-07
Payer: MEDICARE

## 2019-12-07 ENCOUNTER — TRANSCRIBE ORDERS (OUTPATIENT)
Dept: ADMINISTRATIVE | Facility: HOSPITAL | Age: 64
End: 2019-12-07

## 2019-12-07 DIAGNOSIS — E78.5 HYPERLIPIDEMIA, UNSPECIFIED HYPERLIPIDEMIA TYPE: ICD-10-CM

## 2019-12-07 DIAGNOSIS — E11.65 TYPE II DIABETES MELLITUS WITH HYPEROSMOLARITY, UNCONTROLLED (HCC): ICD-10-CM

## 2019-12-07 DIAGNOSIS — N18.30 CHRONIC KIDNEY DISEASE, STAGE III (MODERATE) (HCC): Primary | ICD-10-CM

## 2019-12-07 DIAGNOSIS — N18.30 CHRONIC KIDNEY DISEASE, STAGE III (MODERATE) (HCC): ICD-10-CM

## 2019-12-07 DIAGNOSIS — E11.00 TYPE II DIABETES MELLITUS WITH HYPEROSMOLARITY, UNCONTROLLED (HCC): ICD-10-CM

## 2019-12-07 LAB
ALBUMIN SERPL BCP-MCNC: 3.5 G/DL (ref 3–5.2)
ALP SERPL-CCNC: 131 U/L (ref 43–122)
ALT SERPL W P-5'-P-CCNC: 37 U/L (ref 9–52)
ANION GAP SERPL CALCULATED.3IONS-SCNC: 7 MMOL/L (ref 5–14)
AST SERPL W P-5'-P-CCNC: 34 U/L (ref 17–59)
BILIRUB SERPL-MCNC: 0.7 MG/DL
BUN SERPL-MCNC: 23 MG/DL (ref 5–25)
CALCIUM SERPL-MCNC: 9.1 MG/DL (ref 8.4–10.2)
CHLORIDE SERPL-SCNC: 107 MMOL/L (ref 97–108)
CO2 SERPL-SCNC: 21 MMOL/L (ref 22–30)
CREAT SERPL-MCNC: 1.75 MG/DL (ref 0.7–1.5)
EST. AVERAGE GLUCOSE BLD GHB EST-MCNC: 180 MG/DL
GFR SERPL CREATININE-BSD FRML MDRD: 40 ML/MIN/1.73SQ M
GLUCOSE P FAST SERPL-MCNC: 153 MG/DL (ref 70–99)
HBA1C MFR BLD: 7.9 % (ref 4.2–6.3)
POTASSIUM SERPL-SCNC: 4.3 MMOL/L (ref 3.6–5)
PROT SERPL-MCNC: 6.9 G/DL (ref 5.9–8.4)
SODIUM SERPL-SCNC: 135 MMOL/L (ref 137–147)
TSH SERPL DL<=0.05 MIU/L-ACNC: 1.17 UIU/ML (ref 0.47–4.68)

## 2019-12-07 PROCEDURE — 83036 HEMOGLOBIN GLYCOSYLATED A1C: CPT | Performed by: INTERNAL MEDICINE

## 2019-12-07 PROCEDURE — 36415 COLL VENOUS BLD VENIPUNCTURE: CPT | Performed by: INTERNAL MEDICINE

## 2019-12-07 PROCEDURE — 84443 ASSAY THYROID STIM HORMONE: CPT

## 2019-12-07 PROCEDURE — 80053 COMPREHEN METABOLIC PANEL: CPT

## 2020-01-06 PROBLEM — E66.01 CLASS 2 SEVERE OBESITY DUE TO EXCESS CALORIES WITH SERIOUS COMORBIDITY AND BODY MASS INDEX (BMI) OF 35.0 TO 35.9 IN ADULT (HCC): Status: ACTIVE | Noted: 2020-01-06

## 2020-01-06 PROBLEM — I25.119 ATHEROSCLEROSIS OF NATIVE CORONARY ARTERY WITH ANGINA PECTORIS (HCC): Status: ACTIVE | Noted: 2020-01-06

## 2020-01-06 PROBLEM — N18.4 TYPE 2 DIABETES MELLITUS WITH STAGE 4 CHRONIC KIDNEY DISEASE, WITH LONG-TERM CURRENT USE OF INSULIN (HCC): Status: ACTIVE | Noted: 2020-01-06

## 2020-01-06 PROBLEM — E11.22 TYPE 2 DIABETES MELLITUS WITH STAGE 4 CHRONIC KIDNEY DISEASE, WITH LONG-TERM CURRENT USE OF INSULIN (HCC): Status: ACTIVE | Noted: 2020-01-06

## 2020-01-06 PROBLEM — E66.812 CLASS 2 SEVERE OBESITY DUE TO EXCESS CALORIES WITH SERIOUS COMORBIDITY AND BODY MASS INDEX (BMI) OF 35.0 TO 35.9 IN ADULT (HCC): Status: ACTIVE | Noted: 2020-01-06

## 2020-01-06 PROBLEM — N25.81 HYPERPARATHYROIDISM DUE TO RENAL INSUFFICIENCY (HCC): Status: ACTIVE | Noted: 2020-01-06

## 2020-01-06 PROBLEM — Z79.4 TYPE 2 DIABETES MELLITUS WITH STAGE 4 CHRONIC KIDNEY DISEASE, WITH LONG-TERM CURRENT USE OF INSULIN (HCC): Status: ACTIVE | Noted: 2020-01-06

## 2020-01-06 PROBLEM — N18.6 TYPE 2 DIABETES MELLITUS WITH ESRD (END-STAGE RENAL DISEASE) (HCC): Status: ACTIVE | Noted: 2020-01-06

## 2020-01-15 ENCOUNTER — TELEPHONE (OUTPATIENT)
Dept: HEMATOLOGY ONCOLOGY | Facility: CLINIC | Age: 65
End: 2020-01-15

## 2020-01-16 ENCOUNTER — TRANSCRIBE ORDERS (OUTPATIENT)
Dept: ADMINISTRATIVE | Facility: HOSPITAL | Age: 65
End: 2020-01-16

## 2020-01-16 ENCOUNTER — APPOINTMENT (OUTPATIENT)
Dept: LAB | Facility: HOSPITAL | Age: 65
End: 2020-01-16
Payer: MEDICARE

## 2020-01-16 ENCOUNTER — APPOINTMENT (OUTPATIENT)
Dept: LAB | Facility: HOSPITAL | Age: 65
End: 2020-01-16
Attending: INTERNAL MEDICINE
Payer: MEDICARE

## 2020-01-16 DIAGNOSIS — L30.8 ACUTE VESICULAR DERMATITIS: ICD-10-CM

## 2020-01-16 DIAGNOSIS — L30.8 ACUTE VESICULAR DERMATITIS: Primary | ICD-10-CM

## 2020-01-16 DIAGNOSIS — D69.6 THROMBOCYTOPENIA (HCC): ICD-10-CM

## 2020-01-16 DIAGNOSIS — D50.9 MICROCYTIC ANEMIA: ICD-10-CM

## 2020-01-16 DIAGNOSIS — R76.12 NONSPECIFIC REACTION TO CELL MEDIATED IMMUNITY MEASUREMENT OF GAMMA INTERFERON ANTIGEN RESPONSE WITHOUT ACTIVE TUBERCULOSIS: ICD-10-CM

## 2020-01-16 LAB
ALBUMIN SERPL BCP-MCNC: 3.1 G/DL (ref 3–5.2)
ALP SERPL-CCNC: 140 U/L (ref 43–122)
ALT SERPL W P-5'-P-CCNC: 38 U/L (ref 9–52)
ANION GAP SERPL CALCULATED.3IONS-SCNC: 8 MMOL/L (ref 5–14)
AST SERPL W P-5'-P-CCNC: 30 U/L (ref 17–59)
BASOPHILS # BLD AUTO: 0.1 THOUSANDS/ΜL (ref 0–0.1)
BASOPHILS NFR BLD AUTO: 1 % (ref 0–1)
BILIRUB SERPL-MCNC: 0.5 MG/DL
BUN SERPL-MCNC: 24 MG/DL (ref 5–25)
CALCIUM SERPL-MCNC: 9 MG/DL (ref 8.4–10.2)
CHLORIDE SERPL-SCNC: 103 MMOL/L (ref 97–108)
CO2 SERPL-SCNC: 24 MMOL/L (ref 22–30)
CREAT SERPL-MCNC: 1.7 MG/DL (ref 0.7–1.5)
EOSINOPHIL # BLD AUTO: 0.5 THOUSAND/ΜL (ref 0–0.4)
EOSINOPHIL NFR BLD AUTO: 10 % (ref 0–6)
ERYTHROCYTE [DISTWIDTH] IN BLOOD BY AUTOMATED COUNT: 15.7 %
FERRITIN SERPL-MCNC: 169 NG/ML (ref 8–388)
GFR SERPL CREATININE-BSD FRML MDRD: 41 ML/MIN/1.73SQ M
GLUCOSE P FAST SERPL-MCNC: 281 MG/DL (ref 70–99)
HBV CORE IGM SER QL: NORMAL
HBV SURFACE AB SER-ACNC: <3.1 MIU/ML
HBV SURFACE AG SER QL: NORMAL
HCT VFR BLD AUTO: 42.1 % (ref 41–53)
HCV AB SER QL: NORMAL
HGB BLD-MCNC: 13.7 G/DL (ref 13.5–17.5)
IRON SATN MFR SERPL: 30 %
IRON SERPL-MCNC: 87 UG/DL (ref 65–175)
LYMPHOCYTES # BLD AUTO: 1.5 THOUSANDS/ΜL (ref 0.5–4)
LYMPHOCYTES NFR BLD AUTO: 30 % (ref 25–45)
MCH RBC QN AUTO: 26.2 PG (ref 26–34)
MCHC RBC AUTO-ENTMCNC: 32.6 G/DL (ref 31–36)
MCV RBC AUTO: 81 FL (ref 80–100)
MONOCYTES # BLD AUTO: 0.6 THOUSAND/ΜL (ref 0.2–0.9)
MONOCYTES NFR BLD AUTO: 12 % (ref 1–10)
NEUTROPHILS # BLD AUTO: 2.3 THOUSANDS/ΜL (ref 1.8–7.8)
NEUTS SEG NFR BLD AUTO: 47 % (ref 45–65)
PLATELET # BLD AUTO: 131 THOUSANDS/UL (ref 150–450)
PMV BLD AUTO: 8.5 FL (ref 8.9–12.7)
POTASSIUM SERPL-SCNC: 4.5 MMOL/L (ref 3.6–5)
PROT SERPL-MCNC: 6.3 G/DL (ref 5.9–8.4)
RBC # BLD AUTO: 5.23 MILLION/UL (ref 4.5–5.9)
SODIUM SERPL-SCNC: 135 MMOL/L (ref 137–147)
TIBC SERPL-MCNC: 287 UG/DL (ref 250–450)
TSH SERPL DL<=0.05 MIU/L-ACNC: 0.83 UIU/ML (ref 0.47–4.68)
VIT B12 SERPL-MCNC: 860 PG/ML (ref 100–900)
WBC # BLD AUTO: 4.8 THOUSAND/UL (ref 4.5–11)

## 2020-01-16 PROCEDURE — 36415 COLL VENOUS BLD VENIPUNCTURE: CPT

## 2020-01-16 PROCEDURE — 84165 PROTEIN E-PHORESIS SERUM: CPT

## 2020-01-16 PROCEDURE — 86803 HEPATITIS C AB TEST: CPT

## 2020-01-16 PROCEDURE — 86480 TB TEST CELL IMMUN MEASURE: CPT

## 2020-01-16 PROCEDURE — 84165 PROTEIN E-PHORESIS SERUM: CPT | Performed by: PATHOLOGY

## 2020-01-16 PROCEDURE — 85025 COMPLETE CBC W/AUTO DIFF WBC: CPT

## 2020-01-16 PROCEDURE — 83540 ASSAY OF IRON: CPT

## 2020-01-16 PROCEDURE — 83550 IRON BINDING TEST: CPT

## 2020-01-16 PROCEDURE — 82607 VITAMIN B-12: CPT

## 2020-01-16 PROCEDURE — 86705 HEP B CORE ANTIBODY IGM: CPT

## 2020-01-16 PROCEDURE — 87389 HIV-1 AG W/HIV-1&-2 AB AG IA: CPT

## 2020-01-16 PROCEDURE — 83516 IMMUNOASSAY NONANTIBODY: CPT

## 2020-01-16 PROCEDURE — 82728 ASSAY OF FERRITIN: CPT

## 2020-01-16 PROCEDURE — 86706 HEP B SURFACE ANTIBODY: CPT

## 2020-01-16 PROCEDURE — 87340 HEPATITIS B SURFACE AG IA: CPT

## 2020-01-16 PROCEDURE — 84443 ASSAY THYROID STIM HORMONE: CPT

## 2020-01-16 PROCEDURE — 80053 COMPREHEN METABOLIC PANEL: CPT

## 2020-01-17 LAB
GAMMA INTERFERON BACKGROUND BLD IA-ACNC: 0.14 IU/ML
M TB IFN-G BLD-IMP: POSITIVE
M TB IFN-G CD4+ BCKGRND COR BLD-ACNC: 4.3 IU/ML
M TB IFN-G CD4+ BCKGRND COR BLD-ACNC: 4.57 IU/ML
MITOGEN IGNF BCKGRD COR BLD-ACNC: >10 IU/ML

## 2020-01-18 LAB
ALBUMIN SERPL ELPH-MCNC: 3.46 G/DL (ref 3.5–5)
ALBUMIN SERPL ELPH-MCNC: 50.9 % (ref 52–65)
ALPHA1 GLOB SERPL ELPH-MCNC: 0.29 G/DL (ref 0.1–0.4)
ALPHA1 GLOB SERPL ELPH-MCNC: 4.3 % (ref 2.5–5)
ALPHA2 GLOB SERPL ELPH-MCNC: 0.73 G/DL (ref 0.4–1.2)
ALPHA2 GLOB SERPL ELPH-MCNC: 10.8 % (ref 7–13)
BETA GLOB ABNORMAL SERPL ELPH-MCNC: 0.37 G/DL (ref 0.4–0.8)
BETA1 GLOB SERPL ELPH-MCNC: 5.5 % (ref 5–13)
BETA2 GLOB SERPL ELPH-MCNC: 7.2 % (ref 2–8)
BETA2+GAMMA GLOB SERPL ELPH-MCNC: 0.49 G/DL (ref 0.2–0.5)
GAMMA GLOB ABNORMAL SERPL ELPH-MCNC: 1.45 G/DL (ref 0.5–1.6)
GAMMA GLOB SERPL ELPH-MCNC: 21.3 % (ref 12–22)
HIV 1+2 AB+HIV1 P24 AG SERPL QL IA: NORMAL
IGG/ALB SER: 1.04 {RATIO} (ref 1.1–1.8)
PROT PATTERN SERPL ELPH-IMP: ABNORMAL
PROT SERPL-MCNC: 6.8 G/DL (ref 6.4–8.2)

## 2020-01-22 ENCOUNTER — TELEPHONE (OUTPATIENT)
Dept: HEMATOLOGY ONCOLOGY | Facility: CLINIC | Age: 65
End: 2020-01-22

## 2020-01-22 NOTE — TELEPHONE ENCOUNTER
Pt missed f/u apt today 1/22 at 3:40 w/Dr Phillip Madison  Called pt to reschedule  Pt didn't answer, and could not leave message as mail box was full

## 2020-01-24 LAB — MISCELLANEOUS LAB TEST RESULT: NORMAL

## 2020-01-27 ENCOUNTER — HOSPITAL ENCOUNTER (OUTPATIENT)
Dept: RADIOLOGY | Facility: HOSPITAL | Age: 65
Discharge: HOME | End: 2020-01-27
Payer: MEDICARE

## 2020-01-27 DIAGNOSIS — R76.12 NONSPECIFIC REACTION TO CELL MEDIATED IMMUNITY MEASUREMENT OF GAMMA INTERFERON ANTIGEN RESPONSE WITHOUT ACTIVE TUBERCULOSIS: ICD-10-CM

## 2020-01-27 PROCEDURE — 71046 X-RAY EXAM CHEST 2 VIEWS: CPT

## 2020-06-06 ENCOUNTER — APPOINTMENT (OUTPATIENT)
Dept: LAB | Facility: HOSPITAL | Age: 65
End: 2020-06-06
Payer: MEDICARE

## 2020-06-06 ENCOUNTER — TRANSCRIBE ORDERS (OUTPATIENT)
Dept: ADMINISTRATIVE | Facility: HOSPITAL | Age: 65
End: 2020-06-06

## 2020-06-06 DIAGNOSIS — N18.30 CHRONIC KIDNEY DISEASE, STAGE III (MODERATE) (HCC): Primary | ICD-10-CM

## 2020-06-06 DIAGNOSIS — N18.30 CHRONIC KIDNEY DISEASE, STAGE III (MODERATE) (HCC): ICD-10-CM

## 2020-06-06 LAB
ALBUMIN SERPL BCP-MCNC: 2.9 G/DL (ref 3.5–5)
ANION GAP SERPL CALCULATED.3IONS-SCNC: 9 MMOL/L (ref 4–13)
BASOPHILS # BLD AUTO: 0.04 THOUSANDS/ΜL (ref 0–0.1)
BASOPHILS NFR BLD AUTO: 1 % (ref 0–1)
BUN SERPL-MCNC: 26 MG/DL (ref 5–25)
CALCIUM SERPL-MCNC: 9.2 MG/DL (ref 8.3–10.1)
CHLORIDE SERPL-SCNC: 106 MMOL/L (ref 100–108)
CO2 SERPL-SCNC: 23 MMOL/L (ref 21–32)
CREAT SERPL-MCNC: 1.84 MG/DL (ref 0.6–1.3)
CREAT UR-MCNC: 90.3 MG/DL
EOSINOPHIL # BLD AUTO: 0.18 THOUSAND/ΜL (ref 0–0.61)
EOSINOPHIL NFR BLD AUTO: 3 % (ref 0–6)
ERYTHROCYTE [DISTWIDTH] IN BLOOD BY AUTOMATED COUNT: 14.7 % (ref 11.6–15.1)
GFR SERPL CREATININE-BSD FRML MDRD: 37 ML/MIN/1.73SQ M
GLUCOSE P FAST SERPL-MCNC: 68 MG/DL (ref 65–99)
HCT VFR BLD AUTO: 41.4 % (ref 36.5–49.3)
HGB BLD-MCNC: 13 G/DL (ref 12–17)
IMM GRANULOCYTES # BLD AUTO: 0.02 THOUSAND/UL (ref 0–0.2)
IMM GRANULOCYTES NFR BLD AUTO: 0 % (ref 0–2)
LYMPHOCYTES # BLD AUTO: 1.54 THOUSANDS/ΜL (ref 0.6–4.47)
LYMPHOCYTES NFR BLD AUTO: 29 % (ref 14–44)
MCH RBC QN AUTO: 25.5 PG (ref 26.8–34.3)
MCHC RBC AUTO-ENTMCNC: 31.4 G/DL (ref 31.4–37.4)
MCV RBC AUTO: 81 FL (ref 82–98)
MONOCYTES # BLD AUTO: 0.67 THOUSAND/ΜL (ref 0.17–1.22)
MONOCYTES NFR BLD AUTO: 13 % (ref 4–12)
NEUTROPHILS # BLD AUTO: 2.9 THOUSANDS/ΜL (ref 1.85–7.62)
NEUTS SEG NFR BLD AUTO: 54 % (ref 43–75)
NRBC BLD AUTO-RTO: 0 /100 WBCS
PHOSPHATE SERPL-MCNC: 2.9 MG/DL (ref 2.3–4.1)
PLATELET # BLD AUTO: 150 THOUSANDS/UL (ref 149–390)
PMV BLD AUTO: 10.4 FL (ref 8.9–12.7)
POTASSIUM SERPL-SCNC: 4.8 MMOL/L (ref 3.5–5.3)
PROT UR-MCNC: 14 MG/DL
PROT/CREAT UR: 0.16 MG/G{CREAT} (ref 0–0.1)
PTH-INTACT SERPL-MCNC: 54.1 PG/ML (ref 18.4–80.1)
RBC # BLD AUTO: 5.1 MILLION/UL (ref 3.88–5.62)
SODIUM SERPL-SCNC: 138 MMOL/L (ref 136–145)
URATE SERPL-MCNC: 9.1 MG/DL (ref 4.2–8)
WBC # BLD AUTO: 5.35 THOUSAND/UL (ref 4.31–10.16)

## 2020-06-06 PROCEDURE — 84156 ASSAY OF PROTEIN URINE: CPT | Performed by: INTERNAL MEDICINE

## 2020-06-06 PROCEDURE — 80069 RENAL FUNCTION PANEL: CPT

## 2020-06-06 PROCEDURE — 82570 ASSAY OF URINE CREATININE: CPT | Performed by: INTERNAL MEDICINE

## 2020-06-06 PROCEDURE — 85025 COMPLETE CBC W/AUTO DIFF WBC: CPT

## 2020-06-06 PROCEDURE — 84550 ASSAY OF BLOOD/URIC ACID: CPT

## 2020-06-06 PROCEDURE — 83970 ASSAY OF PARATHORMONE: CPT

## 2020-06-06 PROCEDURE — 36415 COLL VENOUS BLD VENIPUNCTURE: CPT

## 2020-09-26 ENCOUNTER — HOSPITAL ENCOUNTER (EMERGENCY)
Facility: HOSPITAL | Age: 65
Discharge: HOME | End: 2020-09-26
Attending: EMERGENCY MEDICINE | Admitting: EMERGENCY MEDICINE
Payer: MEDICARE

## 2020-09-26 ENCOUNTER — APPOINTMENT (EMERGENCY)
Dept: RADIOLOGY | Facility: HOSPITAL | Age: 65
End: 2020-09-26
Payer: MEDICARE

## 2020-09-26 VITALS
RESPIRATION RATE: 18 BRPM | BODY MASS INDEX: 36.18 KG/M2 | WEIGHT: 210.76 LBS | TEMPERATURE: 97.8 F | HEART RATE: 60 BPM | SYSTOLIC BLOOD PRESSURE: 135 MMHG | OXYGEN SATURATION: 95 % | DIASTOLIC BLOOD PRESSURE: 71 MMHG

## 2020-09-26 DIAGNOSIS — R09.89 GLOBUS SENSATION: Primary | ICD-10-CM

## 2020-09-26 PROCEDURE — 99283 EMERGENCY DEPT VISIT LOW MDM: CPT

## 2020-09-26 PROCEDURE — 70360 X-RAY EXAM OF NECK: CPT

## 2020-09-26 PROCEDURE — 99282 EMERGENCY DEPT VISIT SF MDM: CPT | Performed by: EMERGENCY MEDICINE

## 2020-09-26 RX ORDER — LIDOCAINE HYDROCHLORIDE 20 MG/ML
15 SOLUTION OROPHARYNGEAL ONCE
Status: COMPLETED | OUTPATIENT
Start: 2020-09-26 | End: 2020-09-26

## 2020-09-26 RX ADMIN — LIDOCAINE HYDROCHLORIDE 15 ML: 20 SOLUTION ORAL; TOPICAL at 19:06

## 2020-10-10 ENCOUNTER — HOSPITAL ENCOUNTER (OUTPATIENT)
Dept: CT IMAGING | Facility: HOSPITAL | Age: 65
Discharge: HOME | End: 2020-10-10
Attending: OTOLARYNGOLOGY
Payer: MEDICARE

## 2020-10-10 DIAGNOSIS — D49.0 NEOPLASM OF HYPOPHARYNX: ICD-10-CM

## 2020-10-10 DIAGNOSIS — D49.1 NEOPLASM OF EPIGLOTTIS: ICD-10-CM

## 2020-10-10 DIAGNOSIS — J38.01 UNILATERAL PARTIAL PARALYSIS OF VOCAL CORDS OR LARYNX: ICD-10-CM

## 2020-10-10 DIAGNOSIS — J38.01 UNILATERAL VOCAL CORD PARALYSIS: ICD-10-CM

## 2020-10-10 PROCEDURE — 70491 CT SOFT TISSUE NECK W/DYE: CPT

## 2020-10-10 PROCEDURE — G1004 CDSM NDSC: HCPCS

## 2020-10-10 PROCEDURE — 71260 CT THORAX DX C+: CPT

## 2020-10-10 RX ADMIN — IOHEXOL 100 ML: 350 INJECTION, SOLUTION INTRAVENOUS at 14:05

## 2021-01-01 ENCOUNTER — ANESTHESIA EVENT (INPATIENT)
Dept: ANESTHESIOLOGY | Facility: HOSPITAL | Age: 66
DRG: 871 | End: 2021-01-01
Payer: MEDICARE

## 2021-01-01 ENCOUNTER — APPOINTMENT (INPATIENT)
Dept: NON INVASIVE DIAGNOSTICS | Facility: HOSPITAL | Age: 66
DRG: 871 | End: 2021-01-01
Payer: MEDICARE

## 2021-01-01 ENCOUNTER — HOSPITAL ENCOUNTER (INPATIENT)
Facility: HOSPITAL | Age: 66
LOS: 9 days | DRG: 871 | End: 2021-11-28
Attending: STUDENT IN AN ORGANIZED HEALTH CARE EDUCATION/TRAINING PROGRAM | Admitting: INTERNAL MEDICINE
Payer: MEDICARE

## 2021-01-01 ENCOUNTER — ANESTHESIA (INPATIENT)
Dept: ANESTHESIOLOGY | Facility: HOSPITAL | Age: 66
DRG: 871 | End: 2021-01-01
Payer: MEDICARE

## 2021-01-01 ENCOUNTER — APPOINTMENT (EMERGENCY)
Dept: RADIOLOGY | Facility: HOSPITAL | Age: 66
DRG: 871 | End: 2021-01-01
Payer: MEDICARE

## 2021-01-01 ENCOUNTER — APPOINTMENT (INPATIENT)
Dept: RADIOLOGY | Facility: HOSPITAL | Age: 66
DRG: 871 | End: 2021-01-01
Payer: MEDICARE

## 2021-01-01 DIAGNOSIS — A41.9 SEPSIS (HCC): ICD-10-CM

## 2021-01-01 DIAGNOSIS — A41.89 SEPSIS DUE TO COVID-19 (HCC): ICD-10-CM

## 2021-01-01 DIAGNOSIS — U07.1 SEPSIS DUE TO COVID-19 (HCC): ICD-10-CM

## 2021-01-01 DIAGNOSIS — U07.1 PNEUMONIA DUE TO COVID-19 VIRUS: ICD-10-CM

## 2021-01-01 DIAGNOSIS — U07.1 ACUTE HYPOXEMIC RESPIRATORY FAILURE DUE TO COVID-19 (HCC): ICD-10-CM

## 2021-01-01 DIAGNOSIS — J12.82 PNEUMONIA DUE TO COVID-19 VIRUS: ICD-10-CM

## 2021-01-01 DIAGNOSIS — R78.81 GRAM-POSITIVE BACTEREMIA: ICD-10-CM

## 2021-01-01 DIAGNOSIS — U07.1 COVID-19: ICD-10-CM

## 2021-01-01 DIAGNOSIS — R06.00 DYSPNEA: ICD-10-CM

## 2021-01-01 DIAGNOSIS — R77.8 ELEVATED TROPONIN: ICD-10-CM

## 2021-01-01 DIAGNOSIS — J96.01 ACUTE HYPOXEMIC RESPIRATORY FAILURE DUE TO COVID-19 (HCC): ICD-10-CM

## 2021-01-01 DIAGNOSIS — I46.9 CARDIAC ARREST (HCC): ICD-10-CM

## 2021-01-01 DIAGNOSIS — R09.02 HYPOXIA: Primary | ICD-10-CM

## 2021-01-01 LAB
2HR DELTA HS TROPONIN: -5 NG/L
2HR DELTA HS TROPONIN: 176 NG/L
4HR DELTA HS TROPONIN: -6 NG/L
ALBUMIN SERPL BCP-MCNC: 1.3 G/DL (ref 3.5–5)
ALBUMIN SERPL BCP-MCNC: 2 G/DL (ref 3.5–5)
ALBUMIN SERPL BCP-MCNC: 2.1 G/DL (ref 3.5–5)
ALBUMIN SERPL BCP-MCNC: 2.5 G/DL (ref 3.5–5)
ALP SERPL-CCNC: 117 U/L (ref 46–116)
ALP SERPL-CCNC: 151 U/L (ref 46–116)
ALP SERPL-CCNC: 165 U/L (ref 46–116)
ALP SERPL-CCNC: 171 U/L (ref 46–116)
ALP SERPL-CCNC: 227 U/L (ref 46–116)
ALP SERPL-CCNC: 231 U/L (ref 46–116)
ALT SERPL W P-5'-P-CCNC: 1227 U/L (ref 12–78)
ALT SERPL W P-5'-P-CCNC: 41 U/L (ref 12–78)
ALT SERPL W P-5'-P-CCNC: 42 U/L (ref 12–78)
ALT SERPL W P-5'-P-CCNC: 52 U/L (ref 12–78)
ALT SERPL W P-5'-P-CCNC: 68 U/L (ref 12–78)
ALT SERPL W P-5'-P-CCNC: 80 U/L (ref 12–78)
AMORPH URATE CRY URNS QL MICRO: ABNORMAL /HPF
ANION GAP SERPL CALCULATED.3IONS-SCNC: 10 MMOL/L (ref 4–13)
ANION GAP SERPL CALCULATED.3IONS-SCNC: 11 MMOL/L (ref 4–13)
ANION GAP SERPL CALCULATED.3IONS-SCNC: 11 MMOL/L (ref 4–13)
ANION GAP SERPL CALCULATED.3IONS-SCNC: 12 MMOL/L (ref 4–13)
ANION GAP SERPL CALCULATED.3IONS-SCNC: 12 MMOL/L (ref 4–13)
ANION GAP SERPL CALCULATED.3IONS-SCNC: 14 MMOL/L (ref 4–13)
ANION GAP SERPL CALCULATED.3IONS-SCNC: 30 MMOL/L (ref 4–13)
ANION GAP SERPL CALCULATED.3IONS-SCNC: 6 MMOL/L (ref 4–13)
ANION GAP SERPL CALCULATED.3IONS-SCNC: 7 MMOL/L (ref 4–13)
ANION GAP SERPL CALCULATED.3IONS-SCNC: 9 MMOL/L (ref 4–13)
ANISOCYTOSIS BLD QL SMEAR: PRESENT
AORTIC VALVE MEAN VELOCITY: 13.6 M/S
APICAL FOUR CHAMBER EJECTION FRACTION: 67 %
APTT PPP: 108 SECONDS (ref 23–37)
APTT PPP: 121 SECONDS (ref 23–37)
ARTERIAL PATENCY WRIST A: NO
ARTERIAL PATENCY WRIST A: YES
AST SERPL W P-5'-P-CCNC: 118 U/L (ref 5–45)
AST SERPL W P-5'-P-CCNC: 120 U/L (ref 5–45)
AST SERPL W P-5'-P-CCNC: 1929 U/L (ref 5–45)
AST SERPL W P-5'-P-CCNC: 80 U/L (ref 5–45)
AST SERPL W P-5'-P-CCNC: 84 U/L (ref 5–45)
AST SERPL W P-5'-P-CCNC: 98 U/L (ref 5–45)
ATRIAL RATE: 54 BPM
ATRIAL RATE: 78 BPM
ATRIAL RATE: 94 BPM
AV LVOT MEAN GRADIENT: 4 MMHG
AV LVOT PEAK GRADIENT: 8 MMHG
AV MEAN GRADIENT: 8 MMHG
AV PEAK GRADIENT: 15 MMHG
BACTERIA BLD CULT: ABNORMAL
BACTERIA BLD CULT: ABNORMAL
BACTERIA BLD CULT: NORMAL
BACTERIA UR QL AUTO: ABNORMAL /HPF
BASE EXCESS BLDA CALC-SCNC: -14.3 MMOL/L
BASE EXCESS BLDA CALC-SCNC: -19 MMOL/L
BASOPHILS # BLD AUTO: 0.02 THOUSANDS/ΜL (ref 0–0.1)
BASOPHILS # BLD AUTO: 0.03 THOUSANDS/ΜL (ref 0–0.1)
BASOPHILS # BLD AUTO: 0.04 THOUSANDS/ΜL (ref 0–0.1)
BASOPHILS # BLD MANUAL: 0 THOUSAND/UL (ref 0–0.1)
BASOPHILS # BLD MANUAL: 0 THOUSAND/UL (ref 0–0.1)
BASOPHILS NFR BLD AUTO: 0 % (ref 0–1)
BASOPHILS NFR MAR MANUAL: 0 % (ref 0–1)
BASOPHILS NFR MAR MANUAL: 0 % (ref 0–1)
BILIRUB SERPL-MCNC: 0.57 MG/DL (ref 0.2–1)
BILIRUB SERPL-MCNC: 0.65 MG/DL (ref 0.2–1)
BILIRUB SERPL-MCNC: 0.78 MG/DL (ref 0.2–1)
BILIRUB SERPL-MCNC: 1.01 MG/DL (ref 0.2–1)
BILIRUB SERPL-MCNC: 1.11 MG/DL (ref 0.2–1)
BILIRUB SERPL-MCNC: 1.44 MG/DL (ref 0.2–1)
BILIRUB UR QL STRIP: NEGATIVE
BODY TEMPERATURE: 93.5 DEGREES FEHRENHEIT
BUN SERPL-MCNC: 26 MG/DL (ref 5–25)
BUN SERPL-MCNC: 40 MG/DL (ref 5–25)
BUN SERPL-MCNC: 47 MG/DL (ref 5–25)
BUN SERPL-MCNC: 47 MG/DL (ref 5–25)
BUN SERPL-MCNC: 49 MG/DL (ref 5–25)
BUN SERPL-MCNC: 53 MG/DL (ref 5–25)
BUN SERPL-MCNC: 55 MG/DL (ref 5–25)
BUN SERPL-MCNC: 62 MG/DL (ref 5–25)
BUN SERPL-MCNC: 64 MG/DL (ref 5–25)
BUN SERPL-MCNC: 80 MG/DL (ref 5–25)
CA-I BLD-SCNC: 1.11 MMOL/L (ref 1.12–1.32)
CA-I BLD-SCNC: 1.17 MMOL/L (ref 1.12–1.32)
CALCIUM ALBUM COR SERPL-MCNC: 10.1 MG/DL (ref 8.3–10.1)
CALCIUM ALBUM COR SERPL-MCNC: 9.4 MG/DL (ref 8.3–10.1)
CALCIUM ALBUM COR SERPL-MCNC: 9.6 MG/DL (ref 8.3–10.1)
CALCIUM ALBUM COR SERPL-MCNC: 9.7 MG/DL (ref 8.3–10.1)
CALCIUM ALBUM COR SERPL-MCNC: 9.7 MG/DL (ref 8.3–10.1)
CALCIUM ALBUM COR SERPL-MCNC: 9.9 MG/DL (ref 8.3–10.1)
CALCIUM SERPL-MCNC: 7.7 MG/DL (ref 8.3–10.1)
CALCIUM SERPL-MCNC: 7.9 MG/DL (ref 8.3–10.1)
CALCIUM SERPL-MCNC: 8.1 MG/DL (ref 8.3–10.1)
CALCIUM SERPL-MCNC: 8.2 MG/DL (ref 8.3–10.1)
CALCIUM SERPL-MCNC: 8.2 MG/DL (ref 8.3–10.1)
CALCIUM SERPL-MCNC: 8.3 MG/DL (ref 8.3–10.1)
CALCIUM SERPL-MCNC: 8.4 MG/DL (ref 8.3–10.1)
CALCIUM SERPL-MCNC: 8.7 MG/DL (ref 8.3–10.1)
CARDIAC TROPONIN I PNL SERPL HS: 133 NG/L (ref 8–18)
CARDIAC TROPONIN I PNL SERPL HS: 228 NG/L (ref 8–18)
CARDIAC TROPONIN I PNL SERPL HS: 337 NG/L
CARDIAC TROPONIN I PNL SERPL HS: 36 NG/L
CARDIAC TROPONIN I PNL SERPL HS: 37 NG/L
CARDIAC TROPONIN I PNL SERPL HS: 42 NG/L
CARDIAC TROPONIN I PNL SERPL HS: 513 NG/L
CARDIAC TROPONIN I PNL SERPL HS: 62 NG/L (ref 8–18)
CARDIAC TROPONIN I PNL SERPL HS: 87 NG/L (ref 8–18)
CHLORIDE SERPL-SCNC: 100 MMOL/L (ref 100–108)
CHLORIDE SERPL-SCNC: 101 MMOL/L (ref 100–108)
CHLORIDE SERPL-SCNC: 103 MMOL/L (ref 100–108)
CHLORIDE SERPL-SCNC: 104 MMOL/L (ref 100–108)
CHLORIDE SERPL-SCNC: 97 MMOL/L (ref 100–108)
CHLORIDE SERPL-SCNC: 98 MMOL/L (ref 100–108)
CHLORIDE SERPL-SCNC: 99 MMOL/L (ref 100–108)
CHLORIDE SERPL-SCNC: 99 MMOL/L (ref 100–108)
CK MB SERPL-MCNC: 3 NG/ML (ref 0–5)
CK MB SERPL-MCNC: 4.6 NG/ML (ref 0–5)
CK MB SERPL-MCNC: <1 % (ref 0–2.5)
CK MB SERPL-MCNC: <1 % (ref 0–2.5)
CK SERPL-CCNC: 1386 U/L (ref 39–308)
CK SERPL-CCNC: 962 U/L (ref 39–308)
CLARITY UR: CLEAR
CO2 SERPL-SCNC: 12 MMOL/L (ref 21–32)
CO2 SERPL-SCNC: 20 MMOL/L (ref 21–32)
CO2 SERPL-SCNC: 21 MMOL/L (ref 21–32)
CO2 SERPL-SCNC: 21 MMOL/L (ref 21–32)
CO2 SERPL-SCNC: 22 MMOL/L (ref 21–32)
CO2 SERPL-SCNC: 22 MMOL/L (ref 21–32)
CO2 SERPL-SCNC: 24 MMOL/L (ref 21–32)
CO2 SERPL-SCNC: 26 MMOL/L (ref 21–32)
CO2 SERPL-SCNC: 27 MMOL/L (ref 21–32)
CO2 SERPL-SCNC: 30 MMOL/L (ref 21–32)
COLOR UR: ABNORMAL
CREAT SERPL-MCNC: 1.91 MG/DL (ref 0.6–1.3)
CREAT SERPL-MCNC: 1.98 MG/DL (ref 0.6–1.3)
CREAT SERPL-MCNC: 2.12 MG/DL (ref 0.6–1.3)
CREAT SERPL-MCNC: 2.13 MG/DL (ref 0.6–1.3)
CREAT SERPL-MCNC: 2.15 MG/DL (ref 0.6–1.3)
CREAT SERPL-MCNC: 2.46 MG/DL (ref 0.6–1.3)
CREAT SERPL-MCNC: 2.46 MG/DL (ref 0.6–1.3)
CREAT SERPL-MCNC: 2.48 MG/DL (ref 0.6–1.3)
CREAT SERPL-MCNC: 2.71 MG/DL (ref 0.6–1.3)
CREAT SERPL-MCNC: 4.09 MG/DL (ref 0.6–1.3)
CRP SERPL QL: 167.6 MG/L
CRP SERPL QL: 195.4 MG/L
CRP SERPL QL: 215.3 MG/L
CRP SERPL QL: 26.1 MG/L
D DIMER PPP FEU-MCNC: 0.8 UG/ML FEU
D DIMER PPP FEU-MCNC: 0.99 UG/ML FEU
D DIMER PPP FEU-MCNC: 1.21 UG/ML FEU
DOP CALC AO VTI: 37.69 CM
DOP CALC LVOT PEAK VEL VTI: 26.4 CM
DOP CALC LVOT PEAK VEL: 1.38 M/S
E WAVE DECELERATION TIME: 253 MS
EOSINOPHIL # BLD AUTO: 0 THOUSAND/ΜL (ref 0–0.61)
EOSINOPHIL # BLD MANUAL: 0 THOUSAND/UL (ref 0–0.4)
EOSINOPHIL # BLD MANUAL: 0 THOUSAND/UL (ref 0–0.4)
EOSINOPHIL NFR BLD AUTO: 0 % (ref 0–6)
EOSINOPHIL NFR BLD MANUAL: 0 % (ref 0–6)
EOSINOPHIL NFR BLD MANUAL: 0 % (ref 0–6)
ERYTHROCYTE [DISTWIDTH] IN BLOOD BY AUTOMATED COUNT: 15.8 % (ref 11.6–15.1)
ERYTHROCYTE [DISTWIDTH] IN BLOOD BY AUTOMATED COUNT: 15.9 % (ref 11.6–15.1)
ERYTHROCYTE [DISTWIDTH] IN BLOOD BY AUTOMATED COUNT: 16 % (ref 11.6–15.1)
ERYTHROCYTE [DISTWIDTH] IN BLOOD BY AUTOMATED COUNT: 16 % (ref 11.6–15.1)
ERYTHROCYTE [DISTWIDTH] IN BLOOD BY AUTOMATED COUNT: 16.2 % (ref 11.6–15.1)
ERYTHROCYTE [DISTWIDTH] IN BLOOD BY AUTOMATED COUNT: 16.4 % (ref 11.6–15.1)
ERYTHROCYTE [DISTWIDTH] IN BLOOD BY AUTOMATED COUNT: 16.4 % (ref 11.6–15.1)
FERRITIN SERPL-MCNC: 567 NG/ML (ref 8–388)
FINE GRAN CASTS URNS QL MICRO: ABNORMAL /LPF
G6PD BLD QN: 292 U/10E12 RBC (ref 127–427)
GFR SERPL CREATININE-BSD FRML MDRD: 14 ML/MIN/1.73SQ M
GFR SERPL CREATININE-BSD FRML MDRD: 23 ML/MIN/1.73SQ M
GFR SERPL CREATININE-BSD FRML MDRD: 26 ML/MIN/1.73SQ M
GFR SERPL CREATININE-BSD FRML MDRD: 31 ML/MIN/1.73SQ M
GFR SERPL CREATININE-BSD FRML MDRD: 34 ML/MIN/1.73SQ M
GFR SERPL CREATININE-BSD FRML MDRD: 35 ML/MIN/1.73SQ M
GLUCOSE SERPL-MCNC: 107 MG/DL (ref 65–140)
GLUCOSE SERPL-MCNC: 116 MG/DL (ref 65–140)
GLUCOSE SERPL-MCNC: 116 MG/DL (ref 65–140)
GLUCOSE SERPL-MCNC: 123 MG/DL (ref 65–140)
GLUCOSE SERPL-MCNC: 126 MG/DL (ref 65–140)
GLUCOSE SERPL-MCNC: 132 MG/DL (ref 65–140)
GLUCOSE SERPL-MCNC: 139 MG/DL (ref 65–140)
GLUCOSE SERPL-MCNC: 173 MG/DL (ref 65–140)
GLUCOSE SERPL-MCNC: 181 MG/DL (ref 65–140)
GLUCOSE SERPL-MCNC: 195 MG/DL (ref 65–140)
GLUCOSE SERPL-MCNC: 196 MG/DL (ref 65–140)
GLUCOSE SERPL-MCNC: 199 MG/DL (ref 65–140)
GLUCOSE SERPL-MCNC: 200 MG/DL (ref 65–140)
GLUCOSE SERPL-MCNC: 201 MG/DL (ref 65–140)
GLUCOSE SERPL-MCNC: 205 MG/DL (ref 65–140)
GLUCOSE SERPL-MCNC: 208 MG/DL (ref 65–140)
GLUCOSE SERPL-MCNC: 212 MG/DL (ref 65–140)
GLUCOSE SERPL-MCNC: 218 MG/DL (ref 65–140)
GLUCOSE SERPL-MCNC: 225 MG/DL (ref 65–140)
GLUCOSE SERPL-MCNC: 228 MG/DL (ref 65–140)
GLUCOSE SERPL-MCNC: 232 MG/DL (ref 65–140)
GLUCOSE SERPL-MCNC: 233 MG/DL (ref 65–140)
GLUCOSE SERPL-MCNC: 278 MG/DL (ref 65–140)
GLUCOSE SERPL-MCNC: 280 MG/DL (ref 65–140)
GLUCOSE SERPL-MCNC: 280 MG/DL (ref 65–140)
GLUCOSE SERPL-MCNC: 283 MG/DL (ref 65–140)
GLUCOSE SERPL-MCNC: 285 MG/DL (ref 65–140)
GLUCOSE SERPL-MCNC: 285 MG/DL (ref 65–140)
GLUCOSE SERPL-MCNC: 300 MG/DL (ref 65–140)
GLUCOSE SERPL-MCNC: 31 MG/DL (ref 65–140)
GLUCOSE SERPL-MCNC: 312 MG/DL (ref 65–140)
GLUCOSE SERPL-MCNC: 314 MG/DL (ref 65–140)
GLUCOSE SERPL-MCNC: 314 MG/DL (ref 65–140)
GLUCOSE SERPL-MCNC: 315 MG/DL (ref 65–140)
GLUCOSE SERPL-MCNC: 316 MG/DL (ref 65–140)
GLUCOSE SERPL-MCNC: 319 MG/DL (ref 65–140)
GLUCOSE SERPL-MCNC: 320 MG/DL (ref 65–140)
GLUCOSE SERPL-MCNC: 324 MG/DL (ref 65–140)
GLUCOSE SERPL-MCNC: 326 MG/DL (ref 65–140)
GLUCOSE SERPL-MCNC: 327 MG/DL (ref 65–140)
GLUCOSE SERPL-MCNC: 346 MG/DL (ref 65–140)
GLUCOSE SERPL-MCNC: 354 MG/DL (ref 65–140)
GLUCOSE SERPL-MCNC: 355 MG/DL (ref 65–140)
GLUCOSE SERPL-MCNC: 364 MG/DL (ref 65–140)
GLUCOSE SERPL-MCNC: 376 MG/DL (ref 65–140)
GLUCOSE SERPL-MCNC: 396 MG/DL (ref 65–140)
GLUCOSE SERPL-MCNC: 402 MG/DL (ref 65–140)
GLUCOSE SERPL-MCNC: 415 MG/DL (ref 65–140)
GLUCOSE SERPL-MCNC: 464 MG/DL (ref 65–140)
GLUCOSE SERPL-MCNC: 472 MG/DL (ref 65–140)
GLUCOSE SERPL-MCNC: 484 MG/DL (ref 65–140)
GLUCOSE SERPL-MCNC: 53 MG/DL (ref 65–140)
GLUCOSE SERPL-MCNC: <20 MG/DL (ref 65–140)
GLUCOSE SERPL-MCNC: <20 MG/DL (ref 65–140)
GLUCOSE UR STRIP-MCNC: NEGATIVE MG/DL
GRAM STN SPEC: ABNORMAL
HCO3 BLDA-SCNC: 11.3 MMOL/L (ref 22–28)
HCO3 BLDA-SCNC: 15.1 MMOL/L (ref 22–28)
HCT VFR BLD AUTO: 31.8 % (ref 36.5–49.3)
HCT VFR BLD AUTO: 35.1 % (ref 36.5–49.3)
HCT VFR BLD AUTO: 36.4 % (ref 36.5–49.3)
HCT VFR BLD AUTO: 36.5 % (ref 36.5–49.3)
HCT VFR BLD AUTO: 39.4 % (ref 36.5–49.3)
HCT VFR BLD AUTO: 39.6 % (ref 36.5–49.3)
HCT VFR BLD AUTO: 39.7 % (ref 36.5–49.3)
HCT VFR BLD AUTO: 40.5 % (ref 36.5–49.3)
HCT VFR BLD AUTO: 41.6 % (ref 36.5–49.3)
HGB BLD-MCNC: 10.6 G/DL (ref 12–17)
HGB BLD-MCNC: 11.9 G/DL (ref 12–17)
HGB BLD-MCNC: 12.1 G/DL (ref 12–17)
HGB BLD-MCNC: 12.3 G/DL (ref 12–17)
HGB BLD-MCNC: 12.6 G/DL (ref 12–17)
HGB BLD-MCNC: 12.6 G/DL (ref 12–17)
HGB BLD-MCNC: 12.8 G/DL (ref 12–17)
HGB BLD-MCNC: 13 G/DL (ref 12–17)
HGB BLD-MCNC: 13 G/DL (ref 12–17)
HGB UR QL STRIP.AUTO: ABNORMAL
HOROWITZ INDEX BLDA+IHG-RTO: 100 MM[HG]
HOROWITZ INDEX BLDA+IHG-RTO: 100 MM[HG]
IMM GRANULOCYTES # BLD AUTO: 0.19 THOUSAND/UL (ref 0–0.2)
IMM GRANULOCYTES # BLD AUTO: 0.19 THOUSAND/UL (ref 0–0.2)
IMM GRANULOCYTES # BLD AUTO: >0.5 THOUSAND/UL (ref 0–0.2)
IMM GRANULOCYTES NFR BLD AUTO: 1 % (ref 0–2)
IMM GRANULOCYTES NFR BLD AUTO: 1 % (ref 0–2)
IMM GRANULOCYTES NFR BLD AUTO: 6 % (ref 0–2)
INR PPP: 1.08 (ref 0.84–1.19)
INR PPP: 1.68 (ref 0.84–1.19)
KETONES UR STRIP-MCNC: ABNORMAL MG/DL
LACTATE SERPL-SCNC: 19.3 MMOL/L (ref 0.5–2)
LACTATE SERPL-SCNC: 19.5 MMOL/L (ref 0.5–2)
LACTATE SERPL-SCNC: 2.3 MMOL/L (ref 0.5–2)
LACTATE SERPL-SCNC: 2.3 MMOL/L (ref 0.5–2)
LEUKOCYTE ESTERASE UR QL STRIP: NEGATIVE
LYMPHOCYTES # BLD AUTO: 0.36 THOUSAND/UL (ref 0.6–4.47)
LYMPHOCYTES # BLD AUTO: 0.86 THOUSANDS/ΜL (ref 0.6–4.47)
LYMPHOCYTES # BLD AUTO: 0.94 THOUSANDS/ΜL (ref 0.6–4.47)
LYMPHOCYTES # BLD AUTO: 1.2 THOUSANDS/ΜL (ref 0.6–4.47)
LYMPHOCYTES # BLD AUTO: 11 % (ref 14–44)
LYMPHOCYTES # BLD AUTO: 2.17 THOUSAND/UL (ref 0.6–4.47)
LYMPHOCYTES # BLD AUTO: 3 % (ref 14–44)
LYMPHOCYTES NFR BLD AUTO: 6 % (ref 14–44)
LYMPHOCYTES NFR BLD AUTO: 6 % (ref 14–44)
LYMPHOCYTES NFR BLD AUTO: 7 % (ref 14–44)
MAGNESIUM SERPL-MCNC: 1.6 MG/DL (ref 1.6–2.6)
MAGNESIUM SERPL-MCNC: 3.2 MG/DL (ref 1.6–2.6)
MCH RBC QN AUTO: 24.3 PG (ref 26.8–34.3)
MCH RBC QN AUTO: 24.3 PG (ref 26.8–34.3)
MCH RBC QN AUTO: 24.4 PG (ref 26.8–34.3)
MCH RBC QN AUTO: 24.4 PG (ref 26.8–34.3)
MCH RBC QN AUTO: 24.5 PG (ref 26.8–34.3)
MCH RBC QN AUTO: 24.5 PG (ref 26.8–34.3)
MCH RBC QN AUTO: 24.7 PG (ref 26.8–34.3)
MCH RBC QN AUTO: 25.5 PG (ref 26.8–34.3)
MCH RBC QN AUTO: 26.7 PG (ref 26.8–34.3)
MCHC RBC AUTO-ENTMCNC: 31.3 G/DL (ref 31.4–37.4)
MCHC RBC AUTO-ENTMCNC: 31.6 G/DL (ref 31.4–37.4)
MCHC RBC AUTO-ENTMCNC: 31.7 G/DL (ref 31.4–37.4)
MCHC RBC AUTO-ENTMCNC: 32 G/DL (ref 31.4–37.4)
MCHC RBC AUTO-ENTMCNC: 32.7 G/DL (ref 31.4–37.4)
MCHC RBC AUTO-ENTMCNC: 32.8 G/DL (ref 31.4–37.4)
MCHC RBC AUTO-ENTMCNC: 33.2 G/DL (ref 31.4–37.4)
MCHC RBC AUTO-ENTMCNC: 33.3 G/DL (ref 31.4–37.4)
MCHC RBC AUTO-ENTMCNC: 35 G/DL (ref 31.4–37.4)
MCV RBC AUTO: 74 FL (ref 82–98)
MCV RBC AUTO: 76 FL (ref 82–98)
MCV RBC AUTO: 77 FL (ref 82–98)
MCV RBC AUTO: 78 FL (ref 82–98)
MCV RBC AUTO: 79 FL (ref 82–98)
METAMYELOCYTES NFR BLD MANUAL: 4 % (ref 0–1)
MONOCYTES # BLD AUTO: 0.2 THOUSAND/UL (ref 0–1.22)
MONOCYTES # BLD AUTO: 0.55 THOUSAND/ΜL (ref 0.17–1.22)
MONOCYTES # BLD AUTO: 0.6 THOUSAND/UL (ref 0–1.22)
MONOCYTES # BLD AUTO: 0.69 THOUSAND/ΜL (ref 0.17–1.22)
MONOCYTES # BLD AUTO: 1.16 THOUSAND/ΜL (ref 0.17–1.22)
MONOCYTES NFR BLD AUTO: 4 % (ref 4–12)
MONOCYTES NFR BLD AUTO: 4 % (ref 4–12)
MONOCYTES NFR BLD AUTO: 8 % (ref 4–12)
MONOCYTES NFR BLD: 1 % (ref 4–12)
MONOCYTES NFR BLD: 5 % (ref 4–12)
MV PEAK A VEL: 0.69 M/S
MV PEAK E VEL: 61 CM/S
MV STENOSIS PRESSURE HALF TIME: 0 MS
MYELOCYTES NFR BLD MANUAL: 1 % (ref 0–1)
NEUTROPHILS # BLD AUTO: 12.03 THOUSANDS/ΜL (ref 1.85–7.62)
NEUTROPHILS # BLD AUTO: 12.85 THOUSANDS/ΜL (ref 1.85–7.62)
NEUTROPHILS # BLD AUTO: 14.96 THOUSANDS/ΜL (ref 1.85–7.62)
NEUTROPHILS # BLD MANUAL: 10.86 THOUSAND/UL (ref 1.85–7.62)
NEUTROPHILS # BLD MANUAL: 16.55 THOUSAND/UL (ref 1.85–7.62)
NEUTS BAND NFR BLD MANUAL: 1 % (ref 0–8)
NEUTS BAND NFR BLD MANUAL: 35 % (ref 0–8)
NEUTS SEG NFR BLD AUTO: 49 % (ref 43–75)
NEUTS SEG NFR BLD AUTO: 80 % (ref 43–75)
NEUTS SEG NFR BLD AUTO: 88 % (ref 43–75)
NEUTS SEG NFR BLD AUTO: 89 % (ref 43–75)
NEUTS SEG NFR BLD AUTO: 90 % (ref 43–75)
NITRITE UR QL STRIP: NEGATIVE
NON-SQ EPI CELLS URNS QL MICRO: ABNORMAL /HPF
NRBC BLD AUTO-RTO: 0 /100 WBCS
NT-PROBNP SERPL-MCNC: 3590 PG/ML
O2 CT BLDA-SCNC: 10.3 ML/DL (ref 16–23)
O2 CT BLDA-SCNC: 8.5 ML/DL (ref 16–23)
OSMOLALITY UR/SERPL-RTO: 310 MMOL/KG (ref 282–298)
OSMOLALITY UR: 409 MMOL/KG
OXYHGB MFR BLDA: 97.9 % (ref 94–97)
OXYHGB MFR BLDA: 98.4 % (ref 94–97)
P AXIS: 17 DEGREES
P AXIS: 38 DEGREES
P AXIS: 63 DEGREES
PCO2 BLDA: 50.5 MM HG (ref 36–44)
PCO2 BLDA: 57.9 MM HG (ref 36–44)
PCO2 TEMP ADJ BLDA: 51.2 MM HG (ref 36–44)
PEEP RESPIRATORY: 10 CM[H2O]
PH BLD: 7.07 [PH] (ref 7.35–7.45)
PH BLDA: 6.97 [PH] (ref 7.35–7.45)
PH BLDA: 7.03 [PH] (ref 7.35–7.45)
PH UR STRIP.AUTO: 5.5 [PH]
PHOSPHATE SERPL-MCNC: 14.6 MG/DL (ref 2.3–4.1)
PLATELET # BLD AUTO: 106 THOUSANDS/UL (ref 149–390)
PLATELET # BLD AUTO: 113 THOUSANDS/UL (ref 149–390)
PLATELET # BLD AUTO: 125 THOUSANDS/UL (ref 149–390)
PLATELET # BLD AUTO: 152 THOUSANDS/UL (ref 149–390)
PLATELET # BLD AUTO: 154 THOUSANDS/UL (ref 149–390)
PLATELET # BLD AUTO: 164 THOUSANDS/UL (ref 149–390)
PLATELET # BLD AUTO: 180 THOUSANDS/UL (ref 149–390)
PLATELET # BLD AUTO: 181 THOUSANDS/UL (ref 149–390)
PLATELET # BLD AUTO: 184 THOUSANDS/UL (ref 149–390)
PLATELET BLD QL SMEAR: ABNORMAL
PLATELET BLD QL SMEAR: ABNORMAL
PMV BLD AUTO: 10.1 FL (ref 8.9–12.7)
PMV BLD AUTO: 10.2 FL (ref 8.9–12.7)
PMV BLD AUTO: 10.3 FL (ref 8.9–12.7)
PMV BLD AUTO: 10.4 FL (ref 8.9–12.7)
PMV BLD AUTO: 10.5 FL (ref 8.9–12.7)
PMV BLD AUTO: 10.6 FL (ref 8.9–12.7)
PMV BLD AUTO: 10.8 FL (ref 8.9–12.7)
PMV BLD AUTO: 11 FL (ref 8.9–12.7)
PMV BLD AUTO: 11.4 FL (ref 8.9–12.7)
PO2 BLD: 177.8 MM HG (ref 75–129)
PO2 BLDA: 191.9 MM HG (ref 75–129)
PO2 BLDA: 326.1 MM HG (ref 75–129)
POIKILOCYTOSIS BLD QL SMEAR: PRESENT
POTASSIUM SERPL-SCNC: 4.3 MMOL/L (ref 3.5–5.3)
POTASSIUM SERPL-SCNC: 4.6 MMOL/L (ref 3.5–5.3)
POTASSIUM SERPL-SCNC: 4.8 MMOL/L (ref 3.5–5.3)
POTASSIUM SERPL-SCNC: 5.1 MMOL/L (ref 3.5–5.3)
POTASSIUM SERPL-SCNC: 5.4 MMOL/L (ref 3.5–5.3)
POTASSIUM SERPL-SCNC: 5.5 MMOL/L (ref 3.5–5.3)
POTASSIUM SERPL-SCNC: 5.6 MMOL/L (ref 3.5–5.3)
POTASSIUM SERPL-SCNC: 6.2 MMOL/L (ref 3.5–5.3)
PR INTERVAL: 178 MS
PR INTERVAL: 194 MS
PR INTERVAL: 198 MS
PROCALCITONIN SERPL-MCNC: 0.48 NG/ML
PROCALCITONIN SERPL-MCNC: 11.69 NG/ML
PROCALCITONIN SERPL-MCNC: 21.52 NG/ML
PROCALCITONIN SERPL-MCNC: 24.66 NG/ML
PROCALCITONIN SERPL-MCNC: 36.64 NG/ML
PROT SERPL-MCNC: 4.1 G/DL (ref 6.4–8.2)
PROT SERPL-MCNC: 5.7 G/DL (ref 6.4–8.2)
PROT SERPL-MCNC: 5.9 G/DL (ref 6.4–8.2)
PROT SERPL-MCNC: 6 G/DL (ref 6.4–8.2)
PROT SERPL-MCNC: 6 G/DL (ref 6.4–8.2)
PROT SERPL-MCNC: 6.6 G/DL (ref 6.4–8.2)
PROT UR STRIP-MCNC: ABNORMAL MG/DL
PROTHROMBIN TIME: 13.7 SECONDS (ref 11.6–14.5)
PROTHROMBIN TIME: 19.3 SECONDS (ref 11.6–14.5)
QRS AXIS: -13 DEGREES
QRS AXIS: -5 DEGREES
QRS AXIS: -8 DEGREES
QRSD INTERVAL: 84 MS
QRSD INTERVAL: 86 MS
QRSD INTERVAL: 92 MS
QT INTERVAL: 390 MS
QT INTERVAL: 428 MS
QT INTERVAL: 440 MS
QTC INTERVAL: 413 MS
QTC INTERVAL: 417 MS
QTC INTERVAL: 444 MS
RBC # BLD AUTO: 4.16 MILLION/UL (ref 3.88–5.62)
RBC # BLD AUTO: 4.6 MILLION/UL (ref 3.88–5.62)
RBC # BLD AUTO: 4.72 X10E6/UL (ref 4.14–5.8)
RBC # BLD AUTO: 4.9 MILLION/UL (ref 3.88–5.62)
RBC # BLD AUTO: 4.93 MILLION/UL (ref 3.88–5.62)
RBC # BLD AUTO: 5.1 MILLION/UL (ref 3.88–5.62)
RBC # BLD AUTO: 5.16 MILLION/UL (ref 3.88–5.62)
RBC # BLD AUTO: 5.24 MILLION/UL (ref 3.88–5.62)
RBC # BLD AUTO: 5.3 MILLION/UL (ref 3.88–5.62)
RBC # BLD AUTO: 5.36 MILLION/UL (ref 3.88–5.62)
RBC #/AREA URNS AUTO: ABNORMAL /HPF
RBC MORPH BLD: PRESENT
SCHISTOCYTES BLD QL SMEAR: PRESENT
SODIUM 24H UR-SCNC: 35 MOL/L
SODIUM SERPL-SCNC: 129 MMOL/L (ref 136–145)
SODIUM SERPL-SCNC: 130 MMOL/L (ref 136–145)
SODIUM SERPL-SCNC: 132 MMOL/L (ref 136–145)
SODIUM SERPL-SCNC: 134 MMOL/L (ref 136–145)
SODIUM SERPL-SCNC: 134 MMOL/L (ref 136–145)
SODIUM SERPL-SCNC: 136 MMOL/L (ref 136–145)
SODIUM SERPL-SCNC: 140 MMOL/L (ref 136–145)
SODIUM SERPL-SCNC: 141 MMOL/L (ref 136–145)
SP GR UR STRIP.AUTO: 1.02 (ref 1–1.03)
SPECIMEN SOURCE: ABNORMAL
SPECIMEN SOURCE: ABNORMAL
T WAVE AXIS: 59 DEGREES
T WAVE AXIS: 62 DEGREES
T WAVE AXIS: 79 DEGREES
TRICUSPID VALVE S': 1.4 CM/S
TRIGL SERPL-MCNC: 109 MG/DL
TSH SERPL DL<=0.05 MIU/L-ACNC: 0.53 UIU/ML (ref 0.36–3.74)
UROBILINOGEN UR QL STRIP.AUTO: 0.2 E.U./DL
VENT AC: 12
VENT AC: 14
VENT- AC: AC
VENT- AC: AC
VENTRICULAR RATE: 54 BPM
VENTRICULAR RATE: 56 BPM
VENTRICULAR RATE: 78 BPM
VT SETTING VENT: 450 ML
VT SETTING VENT: 450 ML
WBC # BLD AUTO: 11.93 THOUSAND/UL (ref 4.31–10.16)
WBC # BLD AUTO: 12.6 THOUSAND/UL (ref 4.31–10.16)
WBC # BLD AUTO: 14.47 THOUSAND/UL (ref 4.31–10.16)
WBC # BLD AUTO: 15.05 THOUSAND/UL (ref 4.31–10.16)
WBC # BLD AUTO: 17.07 THOUSAND/UL (ref 4.31–10.16)
WBC # BLD AUTO: 17.84 THOUSAND/UL (ref 4.31–10.16)
WBC # BLD AUTO: 19.7 THOUSAND/UL (ref 4.31–10.16)
WBC # BLD AUTO: 6.93 THOUSAND/UL (ref 4.31–10.16)
WBC # BLD AUTO: 8.94 THOUSAND/UL (ref 4.31–10.16)
WBC #/AREA URNS AUTO: ABNORMAL /HPF

## 2021-01-01 PROCEDURE — 83735 ASSAY OF MAGNESIUM: CPT | Performed by: PHYSICIAN ASSISTANT

## 2021-01-01 PROCEDURE — 80053 COMPREHEN METABOLIC PANEL: CPT | Performed by: PHYSICIAN ASSISTANT

## 2021-01-01 PROCEDURE — 93010 ELECTROCARDIOGRAM REPORT: CPT

## 2021-01-01 PROCEDURE — 80053 COMPREHEN METABOLIC PANEL: CPT | Performed by: STUDENT IN AN ORGANIZED HEALTH CARE EDUCATION/TRAINING PROGRAM

## 2021-01-01 PROCEDURE — 85027 COMPLETE CBC AUTOMATED: CPT | Performed by: PHYSICIAN ASSISTANT

## 2021-01-01 PROCEDURE — 99232 SBSQ HOSP IP/OBS MODERATE 35: CPT | Performed by: PHYSICIAN ASSISTANT

## 2021-01-01 PROCEDURE — 5A1935Z RESPIRATORY VENTILATION, LESS THAN 24 CONSECUTIVE HOURS: ICD-10-PCS | Performed by: INTERNAL MEDICINE

## 2021-01-01 PROCEDURE — 84300 ASSAY OF URINE SODIUM: CPT | Performed by: NURSE PRACTITIONER

## 2021-01-01 PROCEDURE — 76937 US GUIDE VASCULAR ACCESS: CPT | Performed by: PHYSICIAN ASSISTANT

## 2021-01-01 PROCEDURE — 93005 ELECTROCARDIOGRAM TRACING: CPT

## 2021-01-01 PROCEDURE — 97163 PT EVAL HIGH COMPLEX 45 MIN: CPT

## 2021-01-01 PROCEDURE — 93325 DOPPLER ECHO COLOR FLOW MAPG: CPT | Performed by: INTERNAL MEDICINE

## 2021-01-01 PROCEDURE — 83605 ASSAY OF LACTIC ACID: CPT | Performed by: PHYSICIAN ASSISTANT

## 2021-01-01 PROCEDURE — 85025 COMPLETE CBC W/AUTO DIFF WBC: CPT | Performed by: STUDENT IN AN ORGANIZED HEALTH CARE EDUCATION/TRAINING PROGRAM

## 2021-01-01 PROCEDURE — 80048 BASIC METABOLIC PNL TOTAL CA: CPT | Performed by: INTERNAL MEDICINE

## 2021-01-01 PROCEDURE — 36556 INSERT NON-TUNNEL CV CATH: CPT | Performed by: PHYSICIAN ASSISTANT

## 2021-01-01 PROCEDURE — 85730 THROMBOPLASTIN TIME PARTIAL: CPT | Performed by: PHYSICIAN ASSISTANT

## 2021-01-01 PROCEDURE — 36620 INSERTION CATHETER ARTERY: CPT | Performed by: PHYSICIAN ASSISTANT

## 2021-01-01 PROCEDURE — 84100 ASSAY OF PHOSPHORUS: CPT | Performed by: PHYSICIAN ASSISTANT

## 2021-01-01 PROCEDURE — U0003 INFECTIOUS AGENT DETECTION BY NUCLEIC ACID (DNA OR RNA); SEVERE ACUTE RESPIRATORY SYNDROME CORONAVIRUS 2 (SARS-COV-2) (CORONAVIRUS DISEASE [COVID-19]), AMPLIFIED PROBE TECHNIQUE, MAKING USE OF HIGH THROUGHPUT TECHNOLOGIES AS DESCRIBED BY CMS-2020-01-R: HCPCS | Performed by: FAMILY MEDICINE

## 2021-01-01 PROCEDURE — 97530 THERAPEUTIC ACTIVITIES: CPT

## 2021-01-01 PROCEDURE — 85379 FIBRIN DEGRADATION QUANT: CPT | Performed by: PHYSICIAN ASSISTANT

## 2021-01-01 PROCEDURE — 99232 SBSQ HOSP IP/OBS MODERATE 35: CPT | Performed by: NURSE PRACTITIONER

## 2021-01-01 PROCEDURE — 82805 BLOOD GASES W/O2 SATURATION: CPT | Performed by: PHYSICIAN ASSISTANT

## 2021-01-01 PROCEDURE — 87186 SC STD MICRODIL/AGAR DIL: CPT | Performed by: PHYSICIAN ASSISTANT

## 2021-01-01 PROCEDURE — 99223 1ST HOSP IP/OBS HIGH 75: CPT | Performed by: STUDENT IN AN ORGANIZED HEALTH CARE EDUCATION/TRAINING PROGRAM

## 2021-01-01 PROCEDURE — 96365 THER/PROPH/DIAG IV INF INIT: CPT

## 2021-01-01 PROCEDURE — 99232 SBSQ HOSP IP/OBS MODERATE 35: CPT | Performed by: INTERNAL MEDICINE

## 2021-01-01 PROCEDURE — 36415 COLL VENOUS BLD VENIPUNCTURE: CPT | Performed by: PHYSICIAN ASSISTANT

## 2021-01-01 PROCEDURE — 97110 THERAPEUTIC EXERCISES: CPT

## 2021-01-01 PROCEDURE — 99285 EMERGENCY DEPT VISIT HI MDM: CPT

## 2021-01-01 PROCEDURE — 5A12012 PERFORMANCE OF CARDIAC OUTPUT, SINGLE, MANUAL: ICD-10-PCS | Performed by: INTERNAL MEDICINE

## 2021-01-01 PROCEDURE — 4A133J1 MONITORING OF ARTERIAL PULSE, PERIPHERAL, PERCUTANEOUS APPROACH: ICD-10-PCS | Performed by: INTERNAL MEDICINE

## 2021-01-01 PROCEDURE — XW0DXM6 INTRODUCTION OF BARICITINIB INTO MOUTH AND PHARYNX, EXTERNAL APPROACH, NEW TECHNOLOGY GROUP 6: ICD-10-PCS | Performed by: STUDENT IN AN ORGANIZED HEALTH CARE EDUCATION/TRAINING PROGRAM

## 2021-01-01 PROCEDURE — 81001 URINALYSIS AUTO W/SCOPE: CPT | Performed by: NURSE PRACTITIONER

## 2021-01-01 PROCEDURE — 82948 REAGENT STRIP/BLOOD GLUCOSE: CPT

## 2021-01-01 PROCEDURE — 85007 BL SMEAR W/DIFF WBC COUNT: CPT | Performed by: PHYSICIAN ASSISTANT

## 2021-01-01 PROCEDURE — 97167 OT EVAL HIGH COMPLEX 60 MIN: CPT

## 2021-01-01 PROCEDURE — 87040 BLOOD CULTURE FOR BACTERIA: CPT | Performed by: PHYSICIAN ASSISTANT

## 2021-01-01 PROCEDURE — 84145 PROCALCITONIN (PCT): CPT | Performed by: PHYSICIAN ASSISTANT

## 2021-01-01 PROCEDURE — 84478 ASSAY OF TRIGLYCERIDES: CPT | Performed by: PHYSICIAN ASSISTANT

## 2021-01-01 PROCEDURE — 96375 TX/PRO/DX INJ NEW DRUG ADDON: CPT

## 2021-01-01 PROCEDURE — 93308 TTE F-UP OR LMTD: CPT

## 2021-01-01 PROCEDURE — 83935 ASSAY OF URINE OSMOLALITY: CPT | Performed by: NURSE PRACTITIONER

## 2021-01-01 PROCEDURE — U0005 INFEC AGEN DETEC AMPLI PROBE: HCPCS | Performed by: FAMILY MEDICINE

## 2021-01-01 PROCEDURE — 84145 PROCALCITONIN (PCT): CPT | Performed by: STUDENT IN AN ORGANIZED HEALTH CARE EDUCATION/TRAINING PROGRAM

## 2021-01-01 PROCEDURE — 82728 ASSAY OF FERRITIN: CPT | Performed by: PHYSICIAN ASSISTANT

## 2021-01-01 PROCEDURE — 71045 X-RAY EXAM CHEST 1 VIEW: CPT

## 2021-01-01 PROCEDURE — NC001 PR NO CHARGE: Performed by: PHYSICIAN ASSISTANT

## 2021-01-01 PROCEDURE — 85025 COMPLETE CBC W/AUTO DIFF WBC: CPT | Performed by: PHYSICIAN ASSISTANT

## 2021-01-01 PROCEDURE — 93325 DOPPLER ECHO COLOR FLOW MAPG: CPT

## 2021-01-01 PROCEDURE — 93321 DOPPLER ECHO F-UP/LMTD STD: CPT | Performed by: INTERNAL MEDICINE

## 2021-01-01 PROCEDURE — 86140 C-REACTIVE PROTEIN: CPT | Performed by: PHYSICIAN ASSISTANT

## 2021-01-01 PROCEDURE — 99291 CRITICAL CARE FIRST HOUR: CPT | Performed by: PHYSICIAN ASSISTANT

## 2021-01-01 PROCEDURE — 99285 EMERGENCY DEPT VISIT HI MDM: CPT | Performed by: PHYSICIAN ASSISTANT

## 2021-01-01 PROCEDURE — 99231 SBSQ HOSP IP/OBS SF/LOW 25: CPT | Performed by: STUDENT IN AN ORGANIZED HEALTH CARE EDUCATION/TRAINING PROGRAM

## 2021-01-01 PROCEDURE — 82553 CREATINE MB FRACTION: CPT | Performed by: NURSE PRACTITIONER

## 2021-01-01 PROCEDURE — 84484 ASSAY OF TROPONIN QUANT: CPT | Performed by: NURSE PRACTITIONER

## 2021-01-01 PROCEDURE — 86140 C-REACTIVE PROTEIN: CPT | Performed by: STUDENT IN AN ORGANIZED HEALTH CARE EDUCATION/TRAINING PROGRAM

## 2021-01-01 PROCEDURE — 84484 ASSAY OF TROPONIN QUANT: CPT | Performed by: PHYSICIAN ASSISTANT

## 2021-01-01 PROCEDURE — 99292 CRITICAL CARE ADDL 30 MIN: CPT | Performed by: PHYSICIAN ASSISTANT

## 2021-01-01 PROCEDURE — 99233 SBSQ HOSP IP/OBS HIGH 50: CPT | Performed by: INTERNAL MEDICINE

## 2021-01-01 PROCEDURE — 80048 BASIC METABOLIC PNL TOTAL CA: CPT | Performed by: PHYSICIAN ASSISTANT

## 2021-01-01 PROCEDURE — 93321 DOPPLER ECHO F-UP/LMTD STD: CPT

## 2021-01-01 PROCEDURE — 82330 ASSAY OF CALCIUM: CPT | Performed by: PHYSICIAN ASSISTANT

## 2021-01-01 PROCEDURE — 99233 SBSQ HOSP IP/OBS HIGH 50: CPT | Performed by: STUDENT IN AN ORGANIZED HEALTH CARE EDUCATION/TRAINING PROGRAM

## 2021-01-01 PROCEDURE — 82550 ASSAY OF CK (CPK): CPT | Performed by: NURSE PRACTITIONER

## 2021-01-01 PROCEDURE — 36600 WITHDRAWAL OF ARTERIAL BLOOD: CPT

## 2021-01-01 PROCEDURE — 93308 TTE F-UP OR LMTD: CPT | Performed by: INTERNAL MEDICINE

## 2021-01-01 PROCEDURE — 02HV33Z INSERTION OF INFUSION DEVICE INTO SUPERIOR VENA CAVA, PERCUTANEOUS APPROACH: ICD-10-PCS | Performed by: INTERNAL MEDICINE

## 2021-01-01 PROCEDURE — 85027 COMPLETE CBC AUTOMATED: CPT | Performed by: INTERNAL MEDICINE

## 2021-01-01 PROCEDURE — 82955 ASSAY OF G6PD ENZYME: CPT | Performed by: PHYSICIAN ASSISTANT

## 2021-01-01 PROCEDURE — 85610 PROTHROMBIN TIME: CPT | Performed by: PHYSICIAN ASSISTANT

## 2021-01-01 PROCEDURE — XW033E5 INTRODUCTION OF REMDESIVIR ANTI-INFECTIVE INTO PERIPHERAL VEIN, PERCUTANEOUS APPROACH, NEW TECHNOLOGY GROUP 5: ICD-10-PCS | Performed by: STUDENT IN AN ORGANIZED HEALTH CARE EDUCATION/TRAINING PROGRAM

## 2021-01-01 PROCEDURE — 83880 ASSAY OF NATRIURETIC PEPTIDE: CPT | Performed by: PHYSICIAN ASSISTANT

## 2021-01-01 PROCEDURE — 0BH17EZ INSERTION OF ENDOTRACHEAL AIRWAY INTO TRACHEA, VIA NATURAL OR ARTIFICIAL OPENING: ICD-10-PCS | Performed by: INTERNAL MEDICINE

## 2021-01-01 PROCEDURE — 94002 VENT MGMT INPAT INIT DAY: CPT

## 2021-01-01 PROCEDURE — 82550 ASSAY OF CK (CPK): CPT | Performed by: PHYSICIAN ASSISTANT

## 2021-01-01 PROCEDURE — 03HY32Z INSERTION OF MONITORING DEVICE INTO UPPER ARTERY, PERCUTANEOUS APPROACH: ICD-10-PCS | Performed by: INTERNAL MEDICINE

## 2021-01-01 PROCEDURE — 97535 SELF CARE MNGMENT TRAINING: CPT

## 2021-01-01 PROCEDURE — 99223 1ST HOSP IP/OBS HIGH 75: CPT | Performed by: INTERNAL MEDICINE

## 2021-01-01 PROCEDURE — 85007 BL SMEAR W/DIFF WBC COUNT: CPT | Performed by: INTERNAL MEDICINE

## 2021-01-01 PROCEDURE — 87040 BLOOD CULTURE FOR BACTERIA: CPT | Performed by: INTERNAL MEDICINE

## 2021-01-01 PROCEDURE — 99232 SBSQ HOSP IP/OBS MODERATE 35: CPT | Performed by: STUDENT IN AN ORGANIZED HEALTH CARE EDUCATION/TRAINING PROGRAM

## 2021-01-01 PROCEDURE — 4A133B1 MONITORING OF ARTERIAL PRESSURE, PERIPHERAL, PERCUTANEOUS APPROACH: ICD-10-PCS | Performed by: INTERNAL MEDICINE

## 2021-01-01 PROCEDURE — 83930 ASSAY OF BLOOD OSMOLALITY: CPT | Performed by: NURSE PRACTITIONER

## 2021-01-01 PROCEDURE — 84443 ASSAY THYROID STIM HORMONE: CPT | Performed by: NURSE PRACTITIONER

## 2021-01-01 PROCEDURE — 80048 BASIC METABOLIC PNL TOTAL CA: CPT | Performed by: STUDENT IN AN ORGANIZED HEALTH CARE EDUCATION/TRAINING PROGRAM

## 2021-01-01 PROCEDURE — 82553 CREATINE MB FRACTION: CPT | Performed by: PHYSICIAN ASSISTANT

## 2021-01-01 PROCEDURE — 87077 CULTURE AEROBIC IDENTIFY: CPT | Performed by: PHYSICIAN ASSISTANT

## 2021-01-01 PROCEDURE — 99221 1ST HOSP IP/OBS SF/LOW 40: CPT | Performed by: STUDENT IN AN ORGANIZED HEALTH CARE EDUCATION/TRAINING PROGRAM

## 2021-01-01 RX ORDER — FUROSEMIDE 10 MG/ML
40 INJECTION INTRAMUSCULAR; INTRAVENOUS ONCE
Status: COMPLETED | OUTPATIENT
Start: 2021-01-01 | End: 2021-01-01

## 2021-01-01 RX ORDER — SODIUM POLYSTYRENE SULFONATE 4.1 MEQ/G
15 POWDER, FOR SUSPENSION ORAL; RECTAL ONCE
Status: COMPLETED | OUTPATIENT
Start: 2021-01-01 | End: 2021-01-01

## 2021-01-01 RX ORDER — DEXAMETHASONE SODIUM PHOSPHATE 4 MG/ML
6 INJECTION, SOLUTION INTRA-ARTICULAR; INTRALESIONAL; INTRAMUSCULAR; INTRAVENOUS; SOFT TISSUE EVERY 24 HOURS
Status: DISCONTINUED | OUTPATIENT
Start: 2021-01-01 | End: 2021-01-01

## 2021-01-01 RX ORDER — HEPARIN SODIUM 1000 [USP'U]/ML
4000 INJECTION, SOLUTION INTRAVENOUS; SUBCUTANEOUS ONCE
Status: COMPLETED | OUTPATIENT
Start: 2021-01-01 | End: 2021-01-01

## 2021-01-01 RX ORDER — DEXAMETHASONE SODIUM PHOSPHATE 4 MG/ML
6 INJECTION, SOLUTION INTRA-ARTICULAR; INTRALESIONAL; INTRAMUSCULAR; INTRAVENOUS; SOFT TISSUE ONCE
Status: COMPLETED | OUTPATIENT
Start: 2021-01-01 | End: 2021-01-01

## 2021-01-01 RX ORDER — SODIUM BICARBONATE 650 MG/1
650 TABLET ORAL DAILY
Status: DISCONTINUED | OUTPATIENT
Start: 2021-01-01 | End: 2021-01-01

## 2021-01-01 RX ORDER — BUTALBITAL, ACETAMINOPHEN AND CAFFEINE 50; 325; 40 MG/1; MG/1; MG/1
1 TABLET ORAL ONCE AS NEEDED
Status: COMPLETED | OUTPATIENT
Start: 2021-01-01 | End: 2021-01-01

## 2021-01-01 RX ORDER — ISOSORBIDE MONONITRATE 60 MG/1
60 TABLET, EXTENDED RELEASE ORAL EVERY MORNING
Status: DISCONTINUED | OUTPATIENT
Start: 2021-01-01 | End: 2021-01-01

## 2021-01-01 RX ORDER — DEXTROSE MONOHYDRATE 25 G/50ML
25 INJECTION, SOLUTION INTRAVENOUS ONCE
Status: COMPLETED | OUTPATIENT
Start: 2021-01-01 | End: 2021-01-01

## 2021-01-01 RX ORDER — HEPARIN SODIUM 5000 [USP'U]/ML
5000 INJECTION, SOLUTION INTRAVENOUS; SUBCUTANEOUS EVERY 8 HOURS SCHEDULED
Status: DISCONTINUED | OUTPATIENT
Start: 2021-01-01 | End: 2021-01-01

## 2021-01-01 RX ORDER — GUAIFENESIN 600 MG
600 TABLET, EXTENDED RELEASE 12 HR ORAL EVERY 12 HOURS SCHEDULED
Status: DISCONTINUED | OUTPATIENT
Start: 2021-01-01 | End: 2021-01-01

## 2021-01-01 RX ORDER — ISOSORBIDE MONONITRATE 30 MG/1
60 TABLET, EXTENDED RELEASE ORAL EVERY MORNING
Status: DISCONTINUED | OUTPATIENT
Start: 2021-01-01 | End: 2021-01-01

## 2021-01-01 RX ORDER — SODIUM BICARBONATE 650 MG/1
650 TABLET ORAL
Status: DISCONTINUED | OUTPATIENT
Start: 2021-01-01 | End: 2021-01-01

## 2021-01-01 RX ORDER — ATORVASTATIN CALCIUM 40 MG/1
40 TABLET, FILM COATED ORAL
Status: DISCONTINUED | OUTPATIENT
Start: 2021-01-01 | End: 2021-01-01 | Stop reason: SDUPTHER

## 2021-01-01 RX ORDER — CEFAZOLIN SODIUM 2 G/50ML
2000 SOLUTION INTRAVENOUS EVERY 8 HOURS
Status: DISCONTINUED | OUTPATIENT
Start: 2021-01-01 | End: 2021-01-01 | Stop reason: HOSPADM

## 2021-01-01 RX ORDER — SODIUM BICARBONATE 84 MG/ML
INJECTION, SOLUTION INTRAVENOUS CODE/TRAUMA/SEDATION MEDICATION
Status: COMPLETED | OUTPATIENT
Start: 2021-01-01 | End: 2021-01-01

## 2021-01-01 RX ORDER — INSULIN GLARGINE 100 [IU]/ML
10 INJECTION, SOLUTION SUBCUTANEOUS
Status: DISCONTINUED | OUTPATIENT
Start: 2021-01-01 | End: 2021-01-01

## 2021-01-01 RX ORDER — HEPARIN SODIUM 1000 [USP'U]/ML
4000 INJECTION, SOLUTION INTRAVENOUS; SUBCUTANEOUS
Status: DISCONTINUED | OUTPATIENT
Start: 2021-01-01 | End: 2021-01-01 | Stop reason: HOSPADM

## 2021-01-01 RX ORDER — INSULIN GLARGINE 100 [IU]/ML
30 INJECTION, SOLUTION SUBCUTANEOUS
Status: DISCONTINUED | OUTPATIENT
Start: 2021-01-01 | End: 2021-01-01

## 2021-01-01 RX ORDER — HYDROCODONE POLISTIREX AND CHLORPHENIRAMINE POLISTIREX 10; 8 MG/5ML; MG/5ML
5 SUSPENSION, EXTENDED RELEASE ORAL EVERY 12 HOURS PRN
Status: DISCONTINUED | OUTPATIENT
Start: 2021-01-01 | End: 2021-01-01

## 2021-01-01 RX ORDER — DOXYCYCLINE HYCLATE 100 MG/1
100 CAPSULE ORAL EVERY 12 HOURS
Status: DISCONTINUED | OUTPATIENT
Start: 2021-01-01 | End: 2021-01-01

## 2021-01-01 RX ORDER — HYDROXYZINE HYDROCHLORIDE 25 MG/1
25 TABLET, FILM COATED ORAL EVERY 6 HOURS PRN
Status: DISCONTINUED | OUTPATIENT
Start: 2021-01-01 | End: 2021-01-01

## 2021-01-01 RX ORDER — DOXYCYCLINE HYCLATE 100 MG/1
100 CAPSULE ORAL ONCE
Status: COMPLETED | OUTPATIENT
Start: 2021-01-01 | End: 2021-01-01

## 2021-01-01 RX ORDER — ECHINACEA PURPUREA EXTRACT 125 MG
1 TABLET ORAL
Status: DISCONTINUED | OUTPATIENT
Start: 2021-01-01 | End: 2021-01-01

## 2021-01-01 RX ORDER — INSULIN GLARGINE 100 [IU]/ML
10 INJECTION, SOLUTION SUBCUTANEOUS ONCE
Status: DISCONTINUED | OUTPATIENT
Start: 2021-01-01 | End: 2021-01-01

## 2021-01-01 RX ORDER — AMOXICILLIN 250 MG
1 CAPSULE ORAL 2 TIMES DAILY
Status: DISCONTINUED | OUTPATIENT
Start: 2021-01-01 | End: 2021-01-01

## 2021-01-01 RX ORDER — INSULIN GLARGINE 100 [IU]/ML
20 INJECTION, SOLUTION SUBCUTANEOUS
Status: DISCONTINUED | OUTPATIENT
Start: 2021-01-01 | End: 2021-01-01

## 2021-01-01 RX ORDER — CALCIUM CHLORIDE 100 MG/ML
SYRINGE (ML) INTRAVENOUS CODE/TRAUMA/SEDATION MEDICATION
Status: COMPLETED | OUTPATIENT
Start: 2021-01-01 | End: 2021-01-01

## 2021-01-01 RX ORDER — ACETAMINOPHEN 325 MG/1
650 TABLET ORAL EVERY 6 HOURS PRN
Status: DISCONTINUED | OUTPATIENT
Start: 2021-01-01 | End: 2021-01-01

## 2021-01-01 RX ORDER — DEXTROSE MONOHYDRATE 25 G/50ML
50 INJECTION, SOLUTION INTRAVENOUS ONCE
Status: COMPLETED | OUTPATIENT
Start: 2021-01-01 | End: 2021-01-01

## 2021-01-01 RX ORDER — METOPROLOL TARTRATE 5 MG/5ML
2.5 INJECTION INTRAVENOUS ONCE
Status: COMPLETED | OUTPATIENT
Start: 2021-01-01 | End: 2021-01-01

## 2021-01-01 RX ORDER — AMLODIPINE BESYLATE 5 MG/1
5 TABLET ORAL DAILY
Status: DISCONTINUED | OUTPATIENT
Start: 2021-01-01 | End: 2021-01-01

## 2021-01-01 RX ORDER — ACETAMINOPHEN 325 MG/1
650 TABLET ORAL ONCE
Status: COMPLETED | OUTPATIENT
Start: 2021-01-01 | End: 2021-01-01

## 2021-01-01 RX ORDER — CHLORHEXIDINE GLUCONATE 0.12 MG/ML
15 RINSE ORAL EVERY 12 HOURS SCHEDULED
Status: DISCONTINUED | OUTPATIENT
Start: 2021-01-01 | End: 2021-01-01 | Stop reason: HOSPADM

## 2021-01-01 RX ORDER — ATORVASTATIN CALCIUM 40 MG/1
40 TABLET, FILM COATED ORAL
Status: DISCONTINUED | OUTPATIENT
Start: 2021-01-01 | End: 2021-01-01 | Stop reason: HOSPADM

## 2021-01-01 RX ORDER — ATORVASTATIN CALCIUM 40 MG/1
40 TABLET, FILM COATED ORAL
Status: DISCONTINUED | OUTPATIENT
Start: 2021-01-01 | End: 2021-01-01

## 2021-01-01 RX ORDER — OXYCODONE HYDROCHLORIDE 5 MG/1
5 TABLET ORAL EVERY 4 HOURS PRN
Status: DISCONTINUED | OUTPATIENT
Start: 2021-01-01 | End: 2021-01-01

## 2021-01-01 RX ORDER — PROPOFOL 10 MG/ML
10-50 INJECTION, EMULSION INTRAVENOUS
Status: DISCONTINUED | OUTPATIENT
Start: 2021-01-01 | End: 2021-01-01 | Stop reason: HOSPADM

## 2021-01-01 RX ORDER — INSULIN GLARGINE 100 [IU]/ML
25 INJECTION, SOLUTION SUBCUTANEOUS
Status: DISCONTINUED | OUTPATIENT
Start: 2021-01-01 | End: 2021-01-01

## 2021-01-01 RX ORDER — LANOLIN ALCOHOL/MO/W.PET/CERES
6 CREAM (GRAM) TOPICAL
Status: DISCONTINUED | OUTPATIENT
Start: 2021-01-01 | End: 2021-01-01

## 2021-01-01 RX ORDER — HEPARIN SODIUM 10000 [USP'U]/100ML
3-20 INJECTION, SOLUTION INTRAVENOUS
Status: DISCONTINUED | OUTPATIENT
Start: 2021-01-01 | End: 2021-01-01 | Stop reason: HOSPADM

## 2021-01-01 RX ORDER — EPINEPHRINE 0.1 MG/ML
SYRINGE (ML) INJECTION CODE/TRAUMA/SEDATION MEDICATION
Status: COMPLETED | OUTPATIENT
Start: 2021-01-01 | End: 2021-01-01

## 2021-01-01 RX ORDER — DEXTROSE MONOHYDRATE 25 G/50ML
INJECTION, SOLUTION INTRAVENOUS
Status: COMPLETED
Start: 2021-01-01 | End: 2021-01-01

## 2021-01-01 RX ORDER — HYDRALAZINE HYDROCHLORIDE 20 MG/ML
5 INJECTION INTRAMUSCULAR; INTRAVENOUS EVERY 6 HOURS PRN
Status: DISCONTINUED | OUTPATIENT
Start: 2021-01-01 | End: 2021-01-01 | Stop reason: HOSPADM

## 2021-01-01 RX ORDER — SODIUM CHLORIDE 9 MG/ML
75 INJECTION, SOLUTION INTRAVENOUS CONTINUOUS
Status: DISCONTINUED | OUTPATIENT
Start: 2021-01-01 | End: 2021-01-01

## 2021-01-01 RX ORDER — ASPIRIN 81 MG/1
324 TABLET, CHEWABLE ORAL ONCE
Status: COMPLETED | OUTPATIENT
Start: 2021-01-01 | End: 2021-01-01

## 2021-01-01 RX ORDER — TIZANIDINE 4 MG/1
4 TABLET ORAL EVERY 8 HOURS PRN
Status: DISCONTINUED | OUTPATIENT
Start: 2021-01-01 | End: 2021-01-01

## 2021-01-01 RX ORDER — VANCOMYCIN HYDROCHLORIDE 1 G/200ML
15 INJECTION, SOLUTION INTRAVENOUS EVERY 24 HOURS
Status: DISCONTINUED | OUTPATIENT
Start: 2021-11-29 | End: 2021-01-01 | Stop reason: HOSPADM

## 2021-01-01 RX ORDER — INSULIN GLARGINE 100 [IU]/ML
40 INJECTION, SOLUTION SUBCUTANEOUS
Status: DISCONTINUED | OUTPATIENT
Start: 2021-01-01 | End: 2021-01-01

## 2021-01-01 RX ORDER — LORATADINE 10 MG/1
10 TABLET ORAL DAILY
Refills: 0 | Status: DISCONTINUED | OUTPATIENT
Start: 2021-01-01 | End: 2021-01-01

## 2021-01-01 RX ORDER — INSULIN GLARGINE 100 [IU]/ML
35 INJECTION, SOLUTION SUBCUTANEOUS
Status: DISCONTINUED | OUTPATIENT
Start: 2021-01-01 | End: 2021-01-01

## 2021-01-01 RX ORDER — INSULIN GLARGINE 100 [IU]/ML
10 INJECTION, SOLUTION SUBCUTANEOUS ONCE
Status: COMPLETED | OUTPATIENT
Start: 2021-01-01 | End: 2021-01-01

## 2021-01-01 RX ORDER — LIDOCAINE HYDROCHLORIDE 20 MG/ML
15 SOLUTION OROPHARYNGEAL 4 TIMES DAILY PRN
Status: DISCONTINUED | OUTPATIENT
Start: 2021-01-01 | End: 2021-01-01

## 2021-01-01 RX ORDER — HEPARIN SODIUM 1000 [USP'U]/ML
2000 INJECTION, SOLUTION INTRAVENOUS; SUBCUTANEOUS
Status: DISCONTINUED | OUTPATIENT
Start: 2021-01-01 | End: 2021-01-01 | Stop reason: HOSPADM

## 2021-01-01 RX ORDER — PANTOPRAZOLE SODIUM 40 MG/1
40 TABLET, DELAYED RELEASE ORAL
Status: DISCONTINUED | OUTPATIENT
Start: 2021-01-01 | End: 2021-01-01

## 2021-01-01 RX ADMIN — HYDROXYZINE HYDROCHLORIDE 25 MG: 25 TABLET ORAL at 08:42

## 2021-01-01 RX ADMIN — HEPARIN SODIUM 5000 UNITS: 5000 INJECTION INTRAVENOUS; SUBCUTANEOUS at 05:36

## 2021-01-01 RX ADMIN — INSULIN LISPRO 3 UNITS: 100 INJECTION, SOLUTION INTRAVENOUS; SUBCUTANEOUS at 17:12

## 2021-01-01 RX ADMIN — HYDROXYZINE HYDROCHLORIDE 25 MG: 25 TABLET ORAL at 23:04

## 2021-01-01 RX ADMIN — PANTOPRAZOLE SODIUM 40 MG: 40 TABLET, DELAYED RELEASE ORAL at 05:37

## 2021-01-01 RX ADMIN — HYDROCODONE POLISTIREX AND CHLORPHENIRAMINE POLISTIREX 5 ML: 10; 8 SUSPENSION, EXTENDED RELEASE ORAL at 08:13

## 2021-01-01 RX ADMIN — SODIUM BICARBONATE 650 MG TABLET 650 MG: at 17:16

## 2021-01-01 RX ADMIN — HEPARIN SODIUM 5000 UNITS: 5000 INJECTION INTRAVENOUS; SUBCUTANEOUS at 14:38

## 2021-01-01 RX ADMIN — EPINEPHRINE 1 MG: 0.1 INJECTION, SOLUTION ENDOTRACHEAL; INTRACARDIAC; INTRAVENOUS at 00:43

## 2021-01-01 RX ADMIN — OXYCODONE HYDROCHLORIDE 5 MG: 5 TABLET ORAL at 23:49

## 2021-01-01 RX ADMIN — GUAIFENESIN 600 MG: 600 TABLET ORAL at 08:07

## 2021-01-01 RX ADMIN — HEPARIN SODIUM 5000 UNITS: 5000 INJECTION INTRAVENOUS; SUBCUTANEOUS at 13:51

## 2021-01-01 RX ADMIN — PANTOPRAZOLE SODIUM 40 MG: 40 TABLET, DELAYED RELEASE ORAL at 05:38

## 2021-01-01 RX ADMIN — ACETAMINOPHEN 650 MG: 325 TABLET, FILM COATED ORAL at 07:52

## 2021-01-01 RX ADMIN — NOREPINEPHRINE BITARTRATE 30 MCG/MIN: 1 INJECTION, SOLUTION, CONCENTRATE INTRAVENOUS at 03:50

## 2021-01-01 RX ADMIN — SODIUM BICARBONATE 50 MEQ: 84 INJECTION INTRAVENOUS at 03:44

## 2021-01-01 RX ADMIN — HEPARIN SODIUM 5000 UNITS: 5000 INJECTION INTRAVENOUS; SUBCUTANEOUS at 05:34

## 2021-01-01 RX ADMIN — DOCUSATE SODIUM 50 MG AND SENNOSIDES 8.6 MG 1 TABLET: 8.6; 5 TABLET, FILM COATED ORAL at 08:15

## 2021-01-01 RX ADMIN — SODIUM BICARBONATE 650 MG TABLET 650 MG: at 07:38

## 2021-01-01 RX ADMIN — HYDROXYZINE HYDROCHLORIDE 25 MG: 25 TABLET ORAL at 22:44

## 2021-01-01 RX ADMIN — HEPARIN SODIUM 5000 UNITS: 5000 INJECTION INTRAVENOUS; SUBCUTANEOUS at 21:56

## 2021-01-01 RX ADMIN — INSULIN GLARGINE 35 UNITS: 100 INJECTION, SOLUTION SUBCUTANEOUS at 21:51

## 2021-01-01 RX ADMIN — DEXAMETHASONE SODIUM PHOSPHATE 11.1 MG: 10 INJECTION, SOLUTION INTRAMUSCULAR; INTRAVENOUS at 21:46

## 2021-01-01 RX ADMIN — DEXAMETHASONE SODIUM PHOSPHATE 11.1 MG: 10 INJECTION, SOLUTION INTRAMUSCULAR; INTRAVENOUS at 08:14

## 2021-01-01 RX ADMIN — OXYCODONE HYDROCHLORIDE 5 MG: 5 TABLET ORAL at 03:41

## 2021-01-01 RX ADMIN — EPINEPHRINE 1 MG: 0.1 INJECTION, SOLUTION ENDOTRACHEAL; INTRACARDIAC; INTRAVENOUS at 04:00

## 2021-01-01 RX ADMIN — MELATONIN 6 MG: at 21:31

## 2021-01-01 RX ADMIN — CEFAZOLIN SODIUM 2000 MG: 2 SOLUTION INTRAVENOUS at 20:31

## 2021-01-01 RX ADMIN — HEPARIN SODIUM 5000 UNITS: 5000 INJECTION INTRAVENOUS; SUBCUTANEOUS at 15:00

## 2021-01-01 RX ADMIN — ISOSORBIDE MONONITRATE 60 MG: 60 TABLET, EXTENDED RELEASE ORAL at 08:28

## 2021-01-01 RX ADMIN — HYDROXYZINE HYDROCHLORIDE 25 MG: 25 TABLET ORAL at 22:57

## 2021-01-01 RX ADMIN — SODIUM BICARBONATE 50 MEQ: 84 INJECTION INTRAVENOUS at 00:43

## 2021-01-01 RX ADMIN — ATORVASTATIN CALCIUM 40 MG: 40 TABLET, FILM COATED ORAL at 21:17

## 2021-01-01 RX ADMIN — OXYCODONE HYDROCHLORIDE 5 MG: 5 TABLET ORAL at 06:10

## 2021-01-01 RX ADMIN — OXYCODONE HYDROCHLORIDE 5 MG: 5 TABLET ORAL at 05:39

## 2021-01-01 RX ADMIN — Medication 1 MG: at 14:50

## 2021-01-01 RX ADMIN — DOCUSATE SODIUM 50 MG AND SENNOSIDES 8.6 MG 1 TABLET: 8.6; 5 TABLET, FILM COATED ORAL at 17:59

## 2021-01-01 RX ADMIN — GUAIFENESIN 600 MG: 600 TABLET ORAL at 21:51

## 2021-01-01 RX ADMIN — ISOSORBIDE MONONITRATE 60 MG: 60 TABLET, EXTENDED RELEASE ORAL at 08:56

## 2021-01-01 RX ADMIN — DEXAMETHASONE SODIUM PHOSPHATE 11.1 MG: 10 INJECTION, SOLUTION INTRAMUSCULAR; INTRAVENOUS at 20:38

## 2021-01-01 RX ADMIN — Medication 1 MG: at 16:03

## 2021-01-01 RX ADMIN — INSULIN GLARGINE 30 UNITS: 100 INJECTION, SOLUTION SUBCUTANEOUS at 21:56

## 2021-01-01 RX ADMIN — INSULIN LISPRO 20 UNITS: 100 INJECTION, SOLUTION INTRAVENOUS; SUBCUTANEOUS at 11:56

## 2021-01-01 RX ADMIN — INSULIN LISPRO 2 UNITS: 100 INJECTION, SOLUTION INTRAVENOUS; SUBCUTANEOUS at 08:10

## 2021-01-01 RX ADMIN — GUAIFENESIN 600 MG: 600 TABLET ORAL at 20:38

## 2021-01-01 RX ADMIN — HYDROXYZINE HYDROCHLORIDE 25 MG: 25 TABLET ORAL at 05:38

## 2021-01-01 RX ADMIN — SODIUM CHLORIDE: 9 INJECTION INTRAMUSCULAR; INTRAVENOUS; SUBCUTANEOUS at 13:04

## 2021-01-01 RX ADMIN — DEXAMETHASONE SODIUM PHOSPHATE 11.1 MG: 10 INJECTION, SOLUTION INTRAMUSCULAR; INTRAVENOUS at 21:51

## 2021-01-01 RX ADMIN — SODIUM BICARBONATE 50 MEQ: 84 INJECTION INTRAVENOUS at 00:46

## 2021-01-01 RX ADMIN — HEPARIN SODIUM 11.1 UNITS/KG/HR: 10000 INJECTION, SOLUTION INTRAVENOUS at 04:23

## 2021-01-01 RX ADMIN — INSULIN GLARGINE 35 UNITS: 100 INJECTION, SOLUTION SUBCUTANEOUS at 22:44

## 2021-01-01 RX ADMIN — INSULIN LISPRO 20 UNITS: 100 INJECTION, SOLUTION INTRAVENOUS; SUBCUTANEOUS at 17:54

## 2021-01-01 RX ADMIN — ACETAMINOPHEN 650 MG: 325 TABLET, FILM COATED ORAL at 08:42

## 2021-01-01 RX ADMIN — OXYCODONE HYDROCHLORIDE 5 MG: 5 TABLET ORAL at 11:26

## 2021-01-01 RX ADMIN — HEPARIN SODIUM 5000 UNITS: 5000 INJECTION INTRAVENOUS; SUBCUTANEOUS at 05:38

## 2021-01-01 RX ADMIN — ATORVASTATIN CALCIUM 40 MG: 40 TABLET, FILM COATED ORAL at 20:26

## 2021-01-01 RX ADMIN — DEXAMETHASONE SODIUM PHOSPHATE 11.1 MG: 10 INJECTION, SOLUTION INTRAMUSCULAR; INTRAVENOUS at 21:34

## 2021-01-01 RX ADMIN — DEXTROSE MONOHYDRATE 25 ML: 500 INJECTION PARENTERAL at 13:04

## 2021-01-01 RX ADMIN — ACETAMINOPHEN 650 MG: 325 TABLET, FILM COATED ORAL at 08:28

## 2021-01-01 RX ADMIN — DEXAMETHASONE SODIUM PHOSPHATE 11.1 MG: 10 INJECTION, SOLUTION INTRAMUSCULAR; INTRAVENOUS at 21:16

## 2021-01-01 RX ADMIN — INSULIN GLARGINE 35 UNITS: 100 INJECTION, SOLUTION SUBCUTANEOUS at 21:45

## 2021-01-01 RX ADMIN — DEXAMETHASONE SODIUM PHOSPHATE 11.1 MG: 10 INJECTION, SOLUTION INTRAMUSCULAR; INTRAVENOUS at 08:30

## 2021-01-01 RX ADMIN — FUROSEMIDE 40 MG: 10 INJECTION, SOLUTION INTRAVENOUS at 16:30

## 2021-01-01 RX ADMIN — INSULIN LISPRO 20 UNITS: 100 INJECTION, SOLUTION INTRAVENOUS; SUBCUTANEOUS at 08:16

## 2021-01-01 RX ADMIN — ATORVASTATIN CALCIUM 40 MG: 40 TABLET, FILM COATED ORAL at 22:43

## 2021-01-01 RX ADMIN — INSULIN GLARGINE 40 UNITS: 100 INJECTION, SOLUTION SUBCUTANEOUS at 20:26

## 2021-01-01 RX ADMIN — INSULIN LISPRO 3 UNITS: 100 INJECTION, SOLUTION INTRAVENOUS; SUBCUTANEOUS at 16:47

## 2021-01-01 RX ADMIN — DOXYCYCLINE 100 MG: 100 CAPSULE ORAL at 08:32

## 2021-01-01 RX ADMIN — MELATONIN 6 MG: at 21:56

## 2021-01-01 RX ADMIN — LORATADINE 10 MG: 10 TABLET ORAL at 08:13

## 2021-01-01 RX ADMIN — LORATADINE 10 MG: 10 TABLET ORAL at 08:56

## 2021-01-01 RX ADMIN — DOXYCYCLINE 100 MG: 100 CAPSULE ORAL at 21:49

## 2021-01-01 RX ADMIN — ATORVASTATIN CALCIUM 40 MG: 40 TABLET, FILM COATED ORAL at 21:50

## 2021-01-01 RX ADMIN — TIZANIDINE 4 MG: 4 TABLET ORAL at 20:06

## 2021-01-01 RX ADMIN — SODIUM BICARBONATE 650 MG TABLET 650 MG: at 08:09

## 2021-01-01 RX ADMIN — METOPROLOL TARTRATE 2.5 MG: 5 INJECTION INTRAVENOUS at 20:26

## 2021-01-01 RX ADMIN — Medication 1 MG: at 13:14

## 2021-01-01 RX ADMIN — ATORVASTATIN CALCIUM 40 MG: 40 TABLET, FILM COATED ORAL at 22:22

## 2021-01-01 RX ADMIN — GUAIFENESIN 600 MG: 600 TABLET ORAL at 08:33

## 2021-01-01 RX ADMIN — ACETAMINOPHEN 650 MG: 325 TABLET, FILM COATED ORAL at 16:16

## 2021-01-01 RX ADMIN — SODIUM BICARBONATE 650 MG TABLET 650 MG: at 08:06

## 2021-01-01 RX ADMIN — GUAIFENESIN 600 MG: 600 TABLET ORAL at 20:26

## 2021-01-01 RX ADMIN — ATORVASTATIN CALCIUM 40 MG: 40 TABLET, FILM COATED ORAL at 21:51

## 2021-01-01 RX ADMIN — SODIUM CHLORIDE 250 ML: 0.9 INJECTION, SOLUTION INTRAVENOUS at 09:25

## 2021-01-01 RX ADMIN — INSULIN LISPRO 3 UNITS: 100 INJECTION, SOLUTION INTRAVENOUS; SUBCUTANEOUS at 17:54

## 2021-01-01 RX ADMIN — HYDROXYZINE HYDROCHLORIDE 25 MG: 25 TABLET ORAL at 02:32

## 2021-01-01 RX ADMIN — ACETAMINOPHEN 650 MG: 325 TABLET, FILM COATED ORAL at 08:27

## 2021-01-01 RX ADMIN — CEFAZOLIN SODIUM 2000 MG: 2 SOLUTION INTRAVENOUS at 04:43

## 2021-01-01 RX ADMIN — BUTALBITAL, ACETAMINOPHEN, AND CAFFEINE 1 TABLET: 50; 325; 40 TABLET ORAL at 13:40

## 2021-01-01 RX ADMIN — DEXAMETHASONE SODIUM PHOSPHATE 11.1 MG: 10 INJECTION, SOLUTION INTRAMUSCULAR; INTRAVENOUS at 09:37

## 2021-01-01 RX ADMIN — CEFTRIAXONE SODIUM 1000 MG: 10 INJECTION, POWDER, FOR SOLUTION INTRAVENOUS at 07:24

## 2021-01-01 RX ADMIN — SODIUM BICARBONATE 650 MG TABLET 650 MG: at 17:21

## 2021-01-01 RX ADMIN — OXYCODONE HYDROCHLORIDE 5 MG: 5 TABLET ORAL at 17:00

## 2021-01-01 RX ADMIN — GUAIFENESIN 600 MG: 600 TABLET ORAL at 08:15

## 2021-01-01 RX ADMIN — DOXYCYCLINE 100 MG: 100 CAPSULE ORAL at 20:38

## 2021-01-01 RX ADMIN — INSULIN LISPRO 1 UNITS: 100 INJECTION, SOLUTION INTRAVENOUS; SUBCUTANEOUS at 11:30

## 2021-01-01 RX ADMIN — HYDROXYZINE HYDROCHLORIDE 25 MG: 25 TABLET ORAL at 18:07

## 2021-01-01 RX ADMIN — BARICITINIB 2 MG: 2 TABLET, FILM COATED ORAL at 13:42

## 2021-01-01 RX ADMIN — ISOSORBIDE MONONITRATE 60 MG: 60 TABLET, EXTENDED RELEASE ORAL at 08:13

## 2021-01-01 RX ADMIN — INSULIN LISPRO 1 UNITS: 100 INJECTION, SOLUTION INTRAVENOUS; SUBCUTANEOUS at 08:14

## 2021-01-01 RX ADMIN — LORATADINE 10 MG: 10 TABLET ORAL at 08:09

## 2021-01-01 RX ADMIN — MELATONIN 6 MG: at 20:26

## 2021-01-01 RX ADMIN — INSULIN LISPRO 1 UNITS: 100 INJECTION, SOLUTION INTRAVENOUS; SUBCUTANEOUS at 08:16

## 2021-01-01 RX ADMIN — GUAIFENESIN 600 MG: 600 TABLET ORAL at 21:50

## 2021-01-01 RX ADMIN — MELATONIN 6 MG: at 21:03

## 2021-01-01 RX ADMIN — DEXTROSE MONOHYDRATE 50 ML: 25 INJECTION, SOLUTION INTRAVENOUS at 04:03

## 2021-01-01 RX ADMIN — GUAIFENESIN 600 MG: 600 TABLET ORAL at 21:31

## 2021-01-01 RX ADMIN — MELATONIN 6 MG: at 21:51

## 2021-01-01 RX ADMIN — HYDROCODONE POLISTIREX AND CHLORPHENIRAMINE POLISTIREX 5 ML: 10; 8 SUSPENSION, EXTENDED RELEASE ORAL at 08:28

## 2021-01-01 RX ADMIN — GUAIFENESIN 600 MG: 600 TABLET ORAL at 08:11

## 2021-01-01 RX ADMIN — CEFAZOLIN SODIUM 2000 MG: 2 SOLUTION INTRAVENOUS at 19:42

## 2021-01-01 RX ADMIN — DOCUSATE SODIUM 50 MG AND SENNOSIDES 8.6 MG 1 TABLET: 8.6; 5 TABLET, FILM COATED ORAL at 17:53

## 2021-01-01 RX ADMIN — Medication 1 MG: at 14:21

## 2021-01-01 RX ADMIN — HYDROCODONE POLISTIREX AND CHLORPHENIRAMINE POLISTIREX 5 ML: 10; 8 SUSPENSION, EXTENDED RELEASE ORAL at 22:29

## 2021-01-01 RX ADMIN — PANTOPRAZOLE SODIUM 40 MG: 40 TABLET, DELAYED RELEASE ORAL at 05:51

## 2021-01-01 RX ADMIN — SODIUM BICARBONATE 650 MG TABLET 650 MG: at 16:47

## 2021-01-01 RX ADMIN — INSULIN LISPRO 20 UNITS: 100 INJECTION, SOLUTION INTRAVENOUS; SUBCUTANEOUS at 17:59

## 2021-01-01 RX ADMIN — GUAIFENESIN 600 MG: 600 TABLET ORAL at 21:04

## 2021-01-01 RX ADMIN — CEFAZOLIN SODIUM 2000 MG: 2 SOLUTION INTRAVENOUS at 12:50

## 2021-01-01 RX ADMIN — HEPARIN SODIUM 5000 UNITS: 5000 INJECTION INTRAVENOUS; SUBCUTANEOUS at 21:31

## 2021-01-01 RX ADMIN — EPINEPHRINE 2 MCG/MIN: 1 INJECTION, SOLUTION, CONCENTRATE INTRAVENOUS at 02:24

## 2021-01-01 RX ADMIN — CEFTRIAXONE SODIUM 1000 MG: 10 INJECTION, POWDER, FOR SOLUTION INTRAVENOUS at 09:15

## 2021-01-01 RX ADMIN — SODIUM BICARBONATE 650 MG TABLET 650 MG: at 08:56

## 2021-01-01 RX ADMIN — HEPARIN SODIUM 5000 UNITS: 5000 INJECTION INTRAVENOUS; SUBCUTANEOUS at 21:50

## 2021-01-01 RX ADMIN — HYDROCODONE POLISTIREX AND CHLORPHENIRAMINE POLISTIREX 5 ML: 10; 8 SUSPENSION, EXTENDED RELEASE ORAL at 20:38

## 2021-01-01 RX ADMIN — INSULIN GLARGINE 10 UNITS: 100 INJECTION, SOLUTION SUBCUTANEOUS at 21:50

## 2021-01-01 RX ADMIN — Medication 1 SPRAY: at 17:16

## 2021-01-01 RX ADMIN — DOCUSATE SODIUM 50 MG AND SENNOSIDES 8.6 MG 1 TABLET: 8.6; 5 TABLET, FILM COATED ORAL at 08:56

## 2021-01-01 RX ADMIN — INSULIN LISPRO 5 UNITS: 100 INJECTION, SOLUTION INTRAVENOUS; SUBCUTANEOUS at 17:16

## 2021-01-01 RX ADMIN — PANTOPRAZOLE SODIUM 40 MG: 40 TABLET, DELAYED RELEASE ORAL at 05:36

## 2021-01-01 RX ADMIN — INSULIN LISPRO 5 UNITS: 100 INJECTION, SOLUTION INTRAVENOUS; SUBCUTANEOUS at 11:51

## 2021-01-01 RX ADMIN — OXYCODONE HYDROCHLORIDE 5 MG: 5 TABLET ORAL at 17:16

## 2021-01-01 RX ADMIN — GUAIFENESIN 600 MG: 600 TABLET ORAL at 21:17

## 2021-01-01 RX ADMIN — BARICITINIB 2 MG: 2 TABLET, FILM COATED ORAL at 14:15

## 2021-01-01 RX ADMIN — INSULIN LISPRO 4 UNITS: 100 INJECTION, SOLUTION INTRAVENOUS; SUBCUTANEOUS at 11:45

## 2021-01-01 RX ADMIN — REMDESIVIR 100 MG: 100 INJECTION, POWDER, LYOPHILIZED, FOR SOLUTION INTRAVENOUS at 10:16

## 2021-01-01 RX ADMIN — DEXAMETHASONE SODIUM PHOSPHATE 11.1 MG: 10 INJECTION, SOLUTION INTRAMUSCULAR; INTRAVENOUS at 21:29

## 2021-01-01 RX ADMIN — INSULIN LISPRO 1 UNITS: 100 INJECTION, SOLUTION INTRAVENOUS; SUBCUTANEOUS at 17:59

## 2021-01-01 RX ADMIN — DEXAMETHASONE SODIUM PHOSPHATE 6 MG: 4 INJECTION INTRA-ARTICULAR; INTRALESIONAL; INTRAMUSCULAR; INTRAVENOUS; SOFT TISSUE at 06:53

## 2021-01-01 RX ADMIN — ISOSORBIDE MONONITRATE 60 MG: 60 TABLET, EXTENDED RELEASE ORAL at 08:09

## 2021-01-01 RX ADMIN — HYDROXYZINE HYDROCHLORIDE 25 MG: 25 TABLET ORAL at 22:19

## 2021-01-01 RX ADMIN — INSULIN LISPRO 4 UNITS: 100 INJECTION, SOLUTION INTRAVENOUS; SUBCUTANEOUS at 12:51

## 2021-01-01 RX ADMIN — ATORVASTATIN CALCIUM 40 MG: 40 TABLET, FILM COATED ORAL at 21:45

## 2021-01-01 RX ADMIN — Medication 1 SPRAY: at 11:15

## 2021-01-01 RX ADMIN — HEPARIN SODIUM 5000 UNITS: 5000 INJECTION INTRAVENOUS; SUBCUTANEOUS at 05:50

## 2021-01-01 RX ADMIN — OXYCODONE HYDROCHLORIDE 5 MG: 5 TABLET ORAL at 20:06

## 2021-01-01 RX ADMIN — INSULIN LISPRO 5 UNITS: 100 INJECTION, SOLUTION INTRAVENOUS; SUBCUTANEOUS at 07:48

## 2021-01-01 RX ADMIN — OXYCODONE HYDROCHLORIDE 5 MG: 5 TABLET ORAL at 22:29

## 2021-01-01 RX ADMIN — LORATADINE 10 MG: 10 TABLET ORAL at 08:11

## 2021-01-01 RX ADMIN — HEPARIN SODIUM 5000 UNITS: 5000 INJECTION INTRAVENOUS; SUBCUTANEOUS at 14:15

## 2021-01-01 RX ADMIN — DEXTROSE MONOHYDRATE 50 ML: 500 INJECTION PARENTERAL at 01:30

## 2021-01-01 RX ADMIN — OXYCODONE HYDROCHLORIDE 5 MG: 5 TABLET ORAL at 20:38

## 2021-01-01 RX ADMIN — DOXYCYCLINE 100 MG: 100 CAPSULE ORAL at 08:27

## 2021-01-01 RX ADMIN — DOXYCYCLINE 100 MG: 100 CAPSULE ORAL at 08:13

## 2021-01-01 RX ADMIN — PANTOPRAZOLE SODIUM 40 MG: 40 TABLET, DELAYED RELEASE ORAL at 06:14

## 2021-01-01 RX ADMIN — CEFTRIAXONE SODIUM 1000 MG: 10 INJECTION, POWDER, FOR SOLUTION INTRAVENOUS at 09:14

## 2021-01-01 RX ADMIN — ATORVASTATIN CALCIUM 40 MG: 40 TABLET, FILM COATED ORAL at 21:56

## 2021-01-01 RX ADMIN — ISOSORBIDE MONONITRATE 60 MG: 60 TABLET, EXTENDED RELEASE ORAL at 08:15

## 2021-01-01 RX ADMIN — HEPARIN SODIUM 5000 UNITS: 5000 INJECTION INTRAVENOUS; SUBCUTANEOUS at 21:51

## 2021-01-01 RX ADMIN — INSULIN LISPRO 3 UNITS: 100 INJECTION, SOLUTION INTRAVENOUS; SUBCUTANEOUS at 11:23

## 2021-01-01 RX ADMIN — INSULIN LISPRO 2 UNITS: 100 INJECTION, SOLUTION INTRAVENOUS; SUBCUTANEOUS at 11:57

## 2021-01-01 RX ADMIN — SODIUM BICARBONATE 650 MG TABLET 650 MG: at 08:11

## 2021-01-01 RX ADMIN — SODIUM BICARBONATE 100 MEQ: 84 INJECTION INTRAVENOUS at 02:11

## 2021-01-01 RX ADMIN — CEFTRIAXONE SODIUM 1000 MG: 10 INJECTION, POWDER, FOR SOLUTION INTRAVENOUS at 08:07

## 2021-01-01 RX ADMIN — INSULIN LISPRO 4 UNITS: 100 INJECTION, SOLUTION INTRAVENOUS; SUBCUTANEOUS at 16:29

## 2021-01-01 RX ADMIN — NOREPINEPHRINE BITARTRATE 4 MCG/MIN: 1 INJECTION, SOLUTION, CONCENTRATE INTRAVENOUS at 01:26

## 2021-01-01 RX ADMIN — REMDESIVIR 100 MG: 100 INJECTION, POWDER, LYOPHILIZED, FOR SOLUTION INTRAVENOUS at 10:21

## 2021-01-01 RX ADMIN — SODIUM BICARBONATE 650 MG TABLET 650 MG: at 16:34

## 2021-01-01 RX ADMIN — SODIUM BICARBONATE 650 MG TABLET 650 MG: at 17:00

## 2021-01-01 RX ADMIN — CEFAZOLIN SODIUM 2000 MG: 2 SOLUTION INTRAVENOUS at 11:07

## 2021-01-01 RX ADMIN — PANTOPRAZOLE SODIUM 40 MG: 40 TABLET, DELAYED RELEASE ORAL at 05:01

## 2021-01-01 RX ADMIN — HYDROCODONE POLISTIREX AND CHLORPHENIRAMINE POLISTIREX 5 ML: 10; 8 SUSPENSION, EXTENDED RELEASE ORAL at 20:26

## 2021-01-01 RX ADMIN — Medication 1 SPRAY: at 05:42

## 2021-01-01 RX ADMIN — ACETAMINOPHEN 650 MG: 325 TABLET, FILM COATED ORAL at 16:27

## 2021-01-01 RX ADMIN — CEFAZOLIN SODIUM 2000 MG: 2 SOLUTION INTRAVENOUS at 19:52

## 2021-01-01 RX ADMIN — CALCIUM CHLORIDE 1 G: 100 INJECTION PARENTERAL at 00:41

## 2021-01-01 RX ADMIN — HEPARIN SODIUM 5000 UNITS: 5000 INJECTION INTRAVENOUS; SUBCUTANEOUS at 14:04

## 2021-01-01 RX ADMIN — HYDROCODONE POLISTIREX AND CHLORPHENIRAMINE POLISTIREX 5 ML: 10; 8 SUSPENSION, EXTENDED RELEASE ORAL at 11:15

## 2021-01-01 RX ADMIN — INSULIN LISPRO 5 UNITS: 100 INJECTION, SOLUTION INTRAVENOUS; SUBCUTANEOUS at 16:56

## 2021-01-01 RX ADMIN — ATORVASTATIN CALCIUM 40 MG: 40 TABLET, FILM COATED ORAL at 21:31

## 2021-01-01 RX ADMIN — DOCUSATE SODIUM 50 MG AND SENNOSIDES 8.6 MG 1 TABLET: 8.6; 5 TABLET, FILM COATED ORAL at 17:21

## 2021-01-01 RX ADMIN — GUAIFENESIN 600 MG: 600 TABLET ORAL at 08:27

## 2021-01-01 RX ADMIN — OXYCODONE HYDROCHLORIDE 5 MG: 5 TABLET ORAL at 22:40

## 2021-01-01 RX ADMIN — HEPARIN SODIUM 5000 UNITS: 5000 INJECTION INTRAVENOUS; SUBCUTANEOUS at 22:43

## 2021-01-01 RX ADMIN — AMLODIPINE BESYLATE 5 MG: 5 TABLET ORAL at 08:27

## 2021-01-01 RX ADMIN — MELATONIN 6 MG: at 21:49

## 2021-01-01 RX ADMIN — AMLODIPINE BESYLATE 5 MG: 5 TABLET ORAL at 08:15

## 2021-01-01 RX ADMIN — REMDESIVIR 200 MG: 100 INJECTION, POWDER, LYOPHILIZED, FOR SOLUTION INTRAVENOUS at 12:27

## 2021-01-01 RX ADMIN — MELATONIN 6 MG: at 02:32

## 2021-01-01 RX ADMIN — PANTOPRAZOLE SODIUM 40 MG: 40 TABLET, DELAYED RELEASE ORAL at 05:34

## 2021-01-01 RX ADMIN — HYDROXYZINE HYDROCHLORIDE 25 MG: 25 TABLET ORAL at 05:01

## 2021-01-01 RX ADMIN — REMDESIVIR 100 MG: 100 INJECTION, POWDER, LYOPHILIZED, FOR SOLUTION INTRAVENOUS at 09:44

## 2021-01-01 RX ADMIN — ACETAMINOPHEN 650 MG: 325 TABLET, FILM COATED ORAL at 23:03

## 2021-01-01 RX ADMIN — BUTALBITAL, ACETAMINOPHEN, AND CAFFEINE 1 TABLET: 50; 325; 40 TABLET ORAL at 14:13

## 2021-01-01 RX ADMIN — HEPARIN SODIUM 5000 UNITS: 5000 INJECTION INTRAVENOUS; SUBCUTANEOUS at 20:26

## 2021-01-01 RX ADMIN — SODIUM POLYSTYRENE SULFONATE 15 G: 1 POWDER ORAL; RECTAL at 08:57

## 2021-01-01 RX ADMIN — HYDROXYZINE HYDROCHLORIDE 25 MG: 25 TABLET ORAL at 11:26

## 2021-01-01 RX ADMIN — VANCOMYCIN HYDROCHLORIDE 1500 MG: 1 INJECTION, POWDER, LYOPHILIZED, FOR SOLUTION INTRAVENOUS at 04:30

## 2021-01-01 RX ADMIN — DOXYCYCLINE 100 MG: 100 CAPSULE ORAL at 08:07

## 2021-01-01 RX ADMIN — DEXAMETHASONE SODIUM PHOSPHATE 11.1 MG: 10 INJECTION, SOLUTION INTRAMUSCULAR; INTRAVENOUS at 08:15

## 2021-01-01 RX ADMIN — INSULIN GLARGINE 40 UNITS: 100 INJECTION, SOLUTION SUBCUTANEOUS at 21:31

## 2021-01-01 RX ADMIN — HEPARIN SODIUM 5000 UNITS: 5000 INJECTION INTRAVENOUS; SUBCUTANEOUS at 13:40

## 2021-01-01 RX ADMIN — HYDROCODONE POLISTIREX AND CHLORPHENIRAMINE POLISTIREX 5 ML: 10; 8 SUSPENSION, EXTENDED RELEASE ORAL at 18:33

## 2021-01-01 RX ADMIN — SODIUM BICARBONATE 650 MG TABLET 650 MG: at 13:03

## 2021-01-01 RX ADMIN — AMLODIPINE BESYLATE 5 MG: 5 TABLET ORAL at 08:07

## 2021-01-01 RX ADMIN — EPINEPHRINE 1 MG: 0.1 INJECTION, SOLUTION ENDOTRACHEAL; INTRACARDIAC; INTRAVENOUS at 00:37

## 2021-01-01 RX ADMIN — HYDROXYZINE HYDROCHLORIDE 25 MG: 25 TABLET ORAL at 05:34

## 2021-01-01 RX ADMIN — OXYCODONE HYDROCHLORIDE 5 MG: 5 TABLET ORAL at 21:03

## 2021-01-01 RX ADMIN — ASPIRIN 81 MG CHEWABLE TABLET 324 MG: 81 TABLET CHEWABLE at 20:26

## 2021-01-01 RX ADMIN — EPINEPHRINE 1 MG: 0.1 INJECTION, SOLUTION ENDOTRACHEAL; INTRACARDIAC; INTRAVENOUS at 04:04

## 2021-01-01 RX ADMIN — AMLODIPINE BESYLATE 5 MG: 5 TABLET ORAL at 08:13

## 2021-01-01 RX ADMIN — HEPARIN SODIUM 5000 UNITS: 5000 INJECTION INTRAVENOUS; SUBCUTANEOUS at 21:45

## 2021-01-01 RX ADMIN — HYDROCODONE POLISTIREX AND CHLORPHENIRAMINE POLISTIREX 5 ML: 10; 8 SUSPENSION, EXTENDED RELEASE ORAL at 21:04

## 2021-01-01 RX ADMIN — DEXAMETHASONE SODIUM PHOSPHATE 11.1 MG: 10 INJECTION, SOLUTION INTRAMUSCULAR; INTRAVENOUS at 20:26

## 2021-01-01 RX ADMIN — VANCOMYCIN HYDROCHLORIDE 1500 MG: 10 INJECTION, POWDER, LYOPHILIZED, FOR SOLUTION INTRAVENOUS at 16:04

## 2021-01-01 RX ADMIN — DEXAMETHASONE SODIUM PHOSPHATE 11.1 MG: 10 INJECTION, SOLUTION INTRAMUSCULAR; INTRAVENOUS at 09:04

## 2021-01-01 RX ADMIN — HYDROXYZINE HYDROCHLORIDE 25 MG: 25 TABLET ORAL at 17:10

## 2021-01-01 RX ADMIN — HEPARIN SODIUM 5000 UNITS: 5000 INJECTION INTRAVENOUS; SUBCUTANEOUS at 09:34

## 2021-01-01 RX ADMIN — INSULIN LISPRO 3 UNITS: 100 INJECTION, SOLUTION INTRAVENOUS; SUBCUTANEOUS at 08:12

## 2021-01-01 RX ADMIN — INSULIN LISPRO 5 UNITS: 100 INJECTION, SOLUTION INTRAVENOUS; SUBCUTANEOUS at 12:13

## 2021-01-01 RX ADMIN — DOXYCYCLINE 100 MG: 100 CAPSULE ORAL at 21:16

## 2021-01-01 RX ADMIN — GUAIFENESIN 600 MG: 600 TABLET ORAL at 08:56

## 2021-01-01 RX ADMIN — BUTALBITAL, ACETAMINOPHEN, AND CAFFEINE 1 TABLET: 50; 325; 40 TABLET ORAL at 11:49

## 2021-01-01 RX ADMIN — INSULIN LISPRO 10 UNITS: 100 INJECTION, SOLUTION INTRAVENOUS; SUBCUTANEOUS at 08:58

## 2021-01-01 RX ADMIN — Medication 1 MG: at 13:52

## 2021-01-01 RX ADMIN — INSULIN LISPRO 5 UNITS: 100 INJECTION, SOLUTION INTRAVENOUS; SUBCUTANEOUS at 11:46

## 2021-01-01 RX ADMIN — CEFAZOLIN SODIUM 2000 MG: 2 SOLUTION INTRAVENOUS at 11:56

## 2021-01-01 RX ADMIN — MELATONIN 6 MG: at 22:44

## 2021-01-01 RX ADMIN — AMLODIPINE BESYLATE 5 MG: 5 TABLET ORAL at 08:11

## 2021-01-01 RX ADMIN — DEXAMETHASONE SODIUM PHOSPHATE 11.1 MG: 10 INJECTION, SOLUTION INTRAMUSCULAR; INTRAVENOUS at 08:27

## 2021-01-01 RX ADMIN — DEXAMETHASONE SODIUM PHOSPHATE 11.1 MG: 10 INJECTION, SOLUTION INTRAMUSCULAR; INTRAVENOUS at 21:04

## 2021-01-01 RX ADMIN — HEPARIN SODIUM 5000 UNITS: 5000 INJECTION INTRAVENOUS; SUBCUTANEOUS at 05:37

## 2021-01-01 RX ADMIN — LORATADINE 10 MG: 10 TABLET ORAL at 08:07

## 2021-01-01 RX ADMIN — INSULIN LISPRO 4 UNITS: 100 INJECTION, SOLUTION INTRAVENOUS; SUBCUTANEOUS at 17:21

## 2021-01-01 RX ADMIN — OXYCODONE HYDROCHLORIDE 5 MG: 5 TABLET ORAL at 10:19

## 2021-01-01 RX ADMIN — ISOSORBIDE MONONITRATE 60 MG: 60 TABLET, EXTENDED RELEASE ORAL at 08:07

## 2021-01-01 RX ADMIN — AMLODIPINE BESYLATE 5 MG: 5 TABLET ORAL at 08:09

## 2021-01-01 RX ADMIN — Medication 1 SPRAY: at 20:14

## 2021-01-01 RX ADMIN — HYDROXYZINE HYDROCHLORIDE 25 MG: 25 TABLET ORAL at 21:04

## 2021-01-01 RX ADMIN — INSULIN LISPRO 1 UNITS: 100 INJECTION, SOLUTION INTRAVENOUS; SUBCUTANEOUS at 13:03

## 2021-01-01 RX ADMIN — HEPARIN SODIUM 5000 UNITS: 5000 INJECTION INTRAVENOUS; SUBCUTANEOUS at 05:12

## 2021-01-01 RX ADMIN — ISOSORBIDE MONONITRATE 60 MG: 60 TABLET, EXTENDED RELEASE ORAL at 08:11

## 2021-01-01 RX ADMIN — FUROSEMIDE 40 MG: 10 INJECTION, SOLUTION INTRAVENOUS at 14:56

## 2021-01-01 RX ADMIN — MELATONIN 6 MG: at 21:45

## 2021-01-01 RX ADMIN — HEPARIN SODIUM 5000 UNITS: 5000 INJECTION INTRAVENOUS; SUBCUTANEOUS at 14:50

## 2021-01-01 RX ADMIN — CEFTRIAXONE SODIUM 1000 MG: 10 INJECTION, POWDER, FOR SOLUTION INTRAVENOUS at 09:11

## 2021-01-01 RX ADMIN — SODIUM BICARBONATE 650 MG TABLET 650 MG: at 08:27

## 2021-01-01 RX ADMIN — INSULIN LISPRO 2 UNITS: 100 INJECTION, SOLUTION INTRAVENOUS; SUBCUTANEOUS at 07:48

## 2021-01-01 RX ADMIN — LORATADINE 10 MG: 10 TABLET ORAL at 08:15

## 2021-01-01 RX ADMIN — HEPARIN SODIUM 5000 UNITS: 5000 INJECTION INTRAVENOUS; SUBCUTANEOUS at 06:14

## 2021-01-01 RX ADMIN — PANTOPRAZOLE SODIUM 40 MG: 40 TABLET, DELAYED RELEASE ORAL at 05:12

## 2021-01-01 RX ADMIN — SODIUM CHLORIDE 75 ML/HR: 0.9 INJECTION, SOLUTION INTRAVENOUS at 09:30

## 2021-01-01 RX ADMIN — HEPARIN SODIUM 5000 UNITS: 5000 INJECTION INTRAVENOUS; SUBCUTANEOUS at 21:16

## 2021-01-01 RX ADMIN — Medication 1 SPRAY: at 22:19

## 2021-01-01 RX ADMIN — GUAIFENESIN 600 MG: 600 TABLET ORAL at 21:45

## 2021-01-01 RX ADMIN — OXYCODONE HYDROCHLORIDE 5 MG: 5 TABLET ORAL at 23:50

## 2021-01-01 RX ADMIN — GUAIFENESIN 600 MG: 600 TABLET ORAL at 08:13

## 2021-01-01 RX ADMIN — LORATADINE 10 MG: 10 TABLET ORAL at 08:32

## 2021-01-01 RX ADMIN — EPINEPHRINE 1 MG: 0.1 INJECTION, SOLUTION ENDOTRACHEAL; INTRACARDIAC; INTRAVENOUS at 03:46

## 2021-01-01 RX ADMIN — HEPARIN SODIUM 5000 UNITS: 5000 INJECTION INTRAVENOUS; SUBCUTANEOUS at 14:21

## 2021-01-01 RX ADMIN — LORATADINE 10 MG: 10 TABLET ORAL at 12:27

## 2021-01-01 RX ADMIN — OXYCODONE HYDROCHLORIDE 5 MG: 5 TABLET ORAL at 05:34

## 2021-01-01 RX ADMIN — HYDROCODONE POLISTIREX AND CHLORPHENIRAMINE POLISTIREX 5 ML: 10; 8 SUSPENSION, EXTENDED RELEASE ORAL at 21:51

## 2021-01-01 RX ADMIN — LIDOCAINE HYDROCHLORIDE 15 ML: 20 SOLUTION ORAL; TOPICAL at 23:46

## 2021-01-01 RX ADMIN — GUAIFENESIN 600 MG: 600 TABLET ORAL at 08:09

## 2021-01-01 RX ADMIN — SODIUM BICARBONATE 650 MG TABLET 650 MG: at 08:15

## 2021-01-01 RX ADMIN — ISOSORBIDE MONONITRATE 60 MG: 60 TABLET, EXTENDED RELEASE ORAL at 08:32

## 2021-01-01 RX ADMIN — ACETAMINOPHEN 650 MG: 325 TABLET, FILM COATED ORAL at 05:50

## 2021-01-01 RX ADMIN — INSULIN LISPRO 5 UNITS: 100 INJECTION, SOLUTION INTRAVENOUS; SUBCUTANEOUS at 08:08

## 2021-01-01 RX ADMIN — BARICITINIB 2 MG: 2 TABLET, FILM COATED ORAL at 14:03

## 2021-01-01 RX ADMIN — INSULIN LISPRO 3 UNITS: 100 INJECTION, SOLUTION INTRAVENOUS; SUBCUTANEOUS at 16:56

## 2021-01-01 RX ADMIN — INSULIN GLARGINE 10 UNITS: 100 INJECTION, SOLUTION SUBCUTANEOUS at 08:28

## 2021-01-01 RX ADMIN — INSULIN LISPRO 3 UNITS: 100 INJECTION, SOLUTION INTRAVENOUS; SUBCUTANEOUS at 08:59

## 2021-01-01 RX ADMIN — HEPARIN SODIUM 5000 UNITS: 5000 INJECTION INTRAVENOUS; SUBCUTANEOUS at 21:04

## 2021-01-01 RX ADMIN — EPINEPHRINE 1 MG: 0.1 INJECTION, SOLUTION ENDOTRACHEAL; INTRACARDIAC; INTRAVENOUS at 03:56

## 2021-01-01 RX ADMIN — OXYCODONE HYDROCHLORIDE 5 MG: 5 TABLET ORAL at 06:20

## 2021-01-01 RX ADMIN — SODIUM BICARBONATE 50 MEQ: 84 INJECTION INTRAVENOUS at 03:45

## 2021-01-01 RX ADMIN — DEXAMETHASONE SODIUM PHOSPHATE 6 MG: 4 INJECTION, SOLUTION INTRAMUSCULAR; INTRAVENOUS at 09:21

## 2021-01-01 RX ADMIN — SODIUM BICARBONATE 650 MG TABLET 650 MG: at 17:10

## 2021-01-01 RX ADMIN — DOXYCYCLINE 100 MG: 100 CAPSULE ORAL at 07:24

## 2021-01-01 RX ADMIN — CEFAZOLIN SODIUM 2000 MG: 2 SOLUTION INTRAVENOUS at 04:26

## 2021-01-01 RX ADMIN — INSULIN LISPRO 2 UNITS: 100 INJECTION, SOLUTION INTRAVENOUS; SUBCUTANEOUS at 16:53

## 2021-01-01 RX ADMIN — TIZANIDINE 4 MG: 4 TABLET ORAL at 08:09

## 2021-01-01 RX ADMIN — HEPARIN SODIUM 5000 UNITS: 5000 INJECTION INTRAVENOUS; SUBCUTANEOUS at 05:01

## 2021-01-01 RX ADMIN — INSULIN GLARGINE 20 UNITS: 100 INJECTION, SOLUTION SUBCUTANEOUS at 22:22

## 2021-01-01 RX ADMIN — ACETAMINOPHEN 650 MG: 325 TABLET, FILM COATED ORAL at 14:49

## 2021-01-01 RX ADMIN — HEPARIN SODIUM 4000 UNITS: 1000 INJECTION INTRAVENOUS; SUBCUTANEOUS at 04:25

## 2021-01-01 RX ADMIN — TIZANIDINE 4 MG: 4 TABLET ORAL at 11:49

## 2021-01-01 RX ADMIN — DEXAMETHASONE SODIUM PHOSPHATE 11.1 MG: 10 INJECTION, SOLUTION INTRAMUSCULAR; INTRAVENOUS at 08:11

## 2021-01-01 RX ADMIN — HYDROCODONE POLISTIREX AND CHLORPHENIRAMINE POLISTIREX 5 ML: 10; 8 SUSPENSION, EXTENDED RELEASE ORAL at 08:15

## 2021-01-01 RX ADMIN — DEXAMETHASONE SODIUM PHOSPHATE 11.1 MG: 10 INJECTION, SOLUTION INTRAMUSCULAR; INTRAVENOUS at 08:32

## 2021-01-01 RX ADMIN — GUAIFENESIN 600 MG: 600 TABLET ORAL at 21:30

## 2021-01-01 RX ADMIN — TIZANIDINE 4 MG: 4 TABLET ORAL at 18:33

## 2021-01-01 RX ADMIN — VASOPRESSIN 0.04 UNITS/MIN: 20 INJECTION INTRAVENOUS at 02:08

## 2021-01-01 RX ADMIN — HEPARIN SODIUM 5000 UNITS: 5000 INJECTION INTRAVENOUS; SUBCUTANEOUS at 13:03

## 2021-01-01 RX ADMIN — EPINEPHRINE 1 MG: 0.1 INJECTION, SOLUTION ENDOTRACHEAL; INTRACARDIAC; INTRAVENOUS at 03:43

## 2021-01-01 RX ADMIN — EPINEPHRINE 1 MG: 0.1 INJECTION, SOLUTION ENDOTRACHEAL; INTRACARDIAC; INTRAVENOUS at 00:40

## 2021-01-01 RX ADMIN — REMDESIVIR 100 MG: 100 INJECTION, POWDER, LYOPHILIZED, FOR SOLUTION INTRAVENOUS at 10:02

## 2021-01-01 RX ADMIN — AMLODIPINE BESYLATE 5 MG: 5 TABLET ORAL at 08:56

## 2021-01-01 RX ADMIN — DOXYCYCLINE 100 MG: 100 CAPSULE ORAL at 21:03

## 2021-01-01 RX ADMIN — CEFAZOLIN SODIUM 2000 MG: 2 SOLUTION INTRAVENOUS at 04:00

## 2021-01-01 RX ADMIN — LORATADINE 10 MG: 10 TABLET ORAL at 08:28

## 2021-01-01 RX ADMIN — INSULIN LISPRO 5 UNITS: 100 INJECTION, SOLUTION INTRAVENOUS; SUBCUTANEOUS at 08:28

## 2021-01-01 RX ADMIN — INSULIN LISPRO 15 UNITS: 100 INJECTION, SOLUTION INTRAVENOUS; SUBCUTANEOUS at 02:17

## 2021-01-01 RX ADMIN — INSULIN LISPRO 3 UNITS: 100 INJECTION, SOLUTION INTRAVENOUS; SUBCUTANEOUS at 07:42

## 2021-07-09 NOTE — TELEPHONE ENCOUNTER
H and P scanned in I tried to contact the patient to remind him to have his lab work done prior to his appt on 1/22/20  VM is full on the 484 #, 610# has been disconnected

## 2021-11-19 PROBLEM — I10 ESSENTIAL HYPERTENSION: Status: ACTIVE | Noted: 2021-01-01

## 2021-11-19 PROBLEM — A41.89 SEPSIS DUE TO COVID-19 (HCC): Status: ACTIVE | Noted: 2021-01-01

## 2021-11-19 PROBLEM — J12.82 PNEUMONIA DUE TO COVID-19 VIRUS: Status: ACTIVE | Noted: 2021-01-01

## 2021-11-19 PROBLEM — N18.9 CKD (CHRONIC KIDNEY DISEASE): Status: ACTIVE | Noted: 2021-01-01

## 2021-11-19 PROBLEM — J96.01 ACUTE HYPOXEMIC RESPIRATORY FAILURE DUE TO COVID-19 (HCC): Status: ACTIVE | Noted: 2021-01-01

## 2021-11-19 PROBLEM — N17.9 AKI (ACUTE KIDNEY INJURY) (HCC): Status: ACTIVE | Noted: 2021-01-01

## 2021-11-19 PROBLEM — U07.1 PNEUMONIA DUE TO COVID-19 VIRUS: Status: ACTIVE | Noted: 2021-01-01

## 2021-11-19 PROBLEM — U07.1 SEPSIS DUE TO COVID-19 (HCC): Status: ACTIVE | Noted: 2021-01-01

## 2021-11-19 PROBLEM — Z95.1 HX OF CABG: Status: ACTIVE | Noted: 2021-01-01

## 2021-11-19 PROBLEM — N17.0 ACUTE KIDNEY INJURY (AKI) WITH ACUTE TUBULAR NECROSIS (ATN) (HCC): Status: ACTIVE | Noted: 2021-01-01

## 2021-11-19 PROBLEM — E11.9 TYPE 2 DIABETES MELLITUS, WITHOUT LONG-TERM CURRENT USE OF INSULIN (HCC): Status: ACTIVE | Noted: 2020-01-06

## 2021-11-19 NOTE — ASSESSMENT & PLAN NOTE
58-year-old male, history of type 2 diabetes, CAD, hypertension presented with shortness of breath and cough  Patient found to be septic, tachypnea, leukocytosis  COVID positive 11/17, unvaccinated  Checks x-ray shows multifocal pneumonia  IV fluids, IV antibiotics Rocephin  Remdesivir, IV steroids, will consider adding baricitinib pending increasing O2  Encourage self prone in, incentive spirometry, ambulation as able  Will consult pulmonology for further recommendations  Patient appears hypovolemic, will hold Lasix at this time  Will check inflammatory markers

## 2021-11-19 NOTE — ASSESSMENT & PLAN NOTE
Lab Results   Component Value Date    HGBA1C 8 0 (H) 11/04/2021     Given poor p o   Intake, will start patient on sliding scale and Accu-Cheks  Continue to monitor blood glucose while on steroids

## 2021-11-19 NOTE — PLAN OF CARE
Problem: PAIN - ADULT  Goal: Verbalizes/displays adequate comfort level or baseline comfort level  Description: Interventions:  - Encourage patient to monitor pain and request assistance  - Assess pain using appropriate pain scale  - Administer analgesics based on type and severity of pain and evaluate response  - Implement non-pharmacological measures as appropriate and evaluate response  - Consider cultural and social influences on pain and pain management  - Notify physician/advanced practitioner if interventions unsuccessful or patient reports new pain  Outcome: Progressing     Problem: INFECTION - ADULT  Goal: Absence or prevention of progression during hospitalization  Description: INTERVENTIONS:  - Assess and monitor for signs and symptoms of infection  - Monitor lab/diagnostic results  - Monitor all insertion sites, i e  indwelling lines, tubes, and drains  - Monitor endotracheal if appropriate and nasal secretions for changes in amount and color  - [place] appropriate cooling/warming therapies per order  - Administer medications as ordered  - Instruct and encourage patient and family to use good hand hygiene technique  - Identify and instruct in appropriate isolation precautions for identified infection/condition  Outcome: Progressing     Problem: SAFETY ADULT  Goal: Patient will remain free of falls  Description: INTERVENTIONS:  - Educate patient/family on patient safety including physical limitations  - Instruct patient to call for assistance with activity   - Consult OT/PT to assist with strengthening/mobility   - Keep Call bell within reach  - Keep bed low and locked with side rails adjusted as appropriate  - Keep care items and personal belongings within reach  - Initiate and maintain comfort rounds  - Make Fall Risk Sign visible to staff  - Offer Toileting every 2 Hours, in advance of need  - Initiate/Maintain bed alarm  - Obtain necessary fall risk management equipment:   - Apply yellow socks and bracelet for high fall risk patients  - Consider moving patient to room near nurses station  Outcome: Progressing  Goal: Maintain or return to baseline ADL function  Description: INTERVENTIONS:  -  Assess patient's ability to carry out ADLs; assess patient's baseline for ADL function and identify physical deficits which impact ability to perform ADLs (bathing, care of mouth/teeth, toileting, grooming, dressing, etc )  - Assess/evaluate cause of self-care deficits   - Assess range of motion  - Assess patient's mobility; develop plan if impaired  - Assess patient's need for assistive devices and provide as appropriate  - Encourage maximum independence but intervene and supervise when necessary  - Involve family in performance of ADLs  - Assess for home care needs following discharge   - Consider OT consult to assist with ADL evaluation and planning for discharge  - Provide patient education as appropriate  Outcome: Progressing  Goal: Maintains/Returns to pre admission functional level  Description: INTERVENTIONS:  - Perform BMAT or MOVE assessment daily    - Set and communicate daily mobility goal to care team and patient/family/caregiver     - Collaborate with rehabilitation services on mobility goals if consulted  Problem: RESPIRATORY - ADULT  Goal: Achieves optimal ventilation and oxygenation  Description: INTERVENTIONS:  - Assess for changes in respiratory status  - Assess for changes in mentation and behavior  - Position to facilitate oxygenation and minimize respiratory effort  - Oxygen administered by appropriate delivery if ordered  - Initiate smoking cessation education as indicated  - Encourage broncho-pulmonary hygiene including cough, deep breathe, Incentive Spirometry  - Assess the need for suctioning and aspirate as needed  - Assess and instruct to report SOB or any respiratory difficulty  - Respiratory Therapy support as indicated  Outcome: Progressing     - Record patient progress and toleration of activity level   Outcome: Progressing

## 2021-11-19 NOTE — ASSESSMENT & PLAN NOTE
History of CAD status post CABG and stents  Not on aspirin or beta blockers  Continue Imdur and statin

## 2021-11-19 NOTE — ASSESSMENT & PLAN NOTE
Previous lab shows patient's baseline creatinine roughly around 1 8-2  Likely prerenal in setting of COVID, poor p o   Intake, mildly elevated CK  Gentle IV fluids  Monitor BMP

## 2021-11-19 NOTE — CONSULTS
Pulmonary Consultation   Rea Felton 77 y o  male MRN: [id number]  Unit/Bed#: [id number] Encounter: [id number]      Reason for consultation: COVID 23    Requesting physician: Dr Kelsey Ballesteros    Impressions/Recommendations:       1  Acute hypoxic respiratory failure secondary to COVID 19  1  Titrate oxygen as needed to maintain SpO2 >/=88%  2  Pulmonary toilet: IS, cough deep breathe  3  Side lying and prone position  4  Current O2 needs 15L midflow and NRB  2  COVID 19 moderate protocol started  1  Recommendations  1  Check EKG and echocardiogram and start steroids-suspect component of underlying CHF  2  Labs  1   6  2  Procalcitonin 24 66  3  BNP 3,590  4  Troponin  Normal evaluation  3  Medications  1  lipitor  2  Anticoagulation DVT prophylaxis as D-dimer is less than 2  3  Remdesivir day 1/5  4  decadron dosing 0 1mg/kg BID day 1/10  5  Diuretics 40mg IV lasix  6  baricintinab-hold pending lasix-if hypoxia does not improve would initiate tomorrow  7  Antibiotics: continue rocephin and doxycycline  3  Acute on chronic CHF  1  Lasix ordered at 40 mg IV  2  Echo ordered      History of Present Illness   HPI:  Rea Felton is a 77 y o  male seen in consult for COVID 19  Medical history significant for: 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, CKD, DM, GERD, HTN, CAD, PVD, CABG 2013, CHEL, HLD and gastroparesis  Presented to the ED with progressive SOB today after diagnosis of covid on 11/17/21  + fevers,body aches and headache  Noted to be hypoxic in the ED requiring up to 15 L     Patient is unvaccinated    At the time of evaluation he reports severe dry cough and resulting dyspnea and chest discomfort  Denies: night sweats bronchospasm or hemoptysis    From a pulmonary standpoint he has a history of TB in 1972-asymptomatic since treatment  He is a never smoker  + GERD denies:  DAMION, Dysphagia  Denies: recent sick contacts, exposures or travel        Review of systems:  12 point review of systems was completed and was otherwise negative except as listed in HPI  Historical Information   Past Medical History:   Diagnosis Date    Chronic kidney disease     Coronary artery disease     Diabetes [facility] )     Diabetes mellitus ([facility] )     GERD (gastroesophageal reflux disease)     Hypertension      Past Surgical History:   Procedure Laterality Date    CORONARY ARTERY BYPASS GRAFT  2008    MYRINGOTOMY W/ TUBES Bilateral 02/20/2019    [facility] - Dr Lewis Mitten - T tubes     History reviewed  No pertinent family history      Occupational history: non contributroy    Tobacco history: never smoker    Meds/Allergies   Current Facility-Administered Medications   Medication Dose Route Frequency    [START ON 11/20/2021] amLODIPine (NORVASC) tablet 5 mg  5 mg Oral Daily    atorvastatin (LIPITOR) tablet 40 mg  40 mg Oral HS    [START ON 11/20/2021] ceftriaxone (ROCEPHIN) 1 g/50 mL in dextrose IVPB  1,000 mg Intravenous Q24H    dexamethasone (DECADRON) 11 1 mg in sodium chloride 0 9 % 50 mL IVPB  0 1 mg/kg Intravenous Q12H [address]    doxycycline hyclate (VIBRAMYCIN) capsule 100 mg  100 mg Oral Q12H    furosemide (LASIX) injection 40 mg  40 mg Intravenous Once    heparin (porcine) subcutaneous injection 5,000 Units  5,000 Units Subcutaneous Q8H [address]    insulin lispro (HumaLOG) 100 units/mL subcutaneous injection 1-5 Units  1-5 Units Subcutaneous TID AC    [START ON 11/20/2021] isosorbide mononitrate (IMDUR) 24 hr tablet 60 mg  60 mg Oral QAM    loratadine (CLARITIN) tablet 10 mg  10 mg Oral Daily    [START ON 11/20/2021] pantoprazole (PROTONIX) EC tablet 40 mg  40 mg Oral Early Morning    [START ON 11/20/2021] remdesivir (Veklury) 100 mg in sodium chloride 0 9 % 270 mL IVPB  100 mg Intravenous Q24H    sodium chloride 0 9 % infusion  75 mL/hr Intravenous Continuous     Medications Prior to Admission   Medication    acetaminophen (TYLENOL) 500 mg tablet    amLODIPine (NORVASC) 5 mg tablet    Artificial Tear Ointment (REFRESH LACRI-LUBE OP)    atorvastatin (LIPITOR) 80 mg tablet    azithromycin (Zithromax) 250 mg tablet    CARLEY MICROLET LANCETS lancets    cetirizine (ZyrTEC) 10 mg tablet    Cholecalciferol 2000 units CAPS    CONTOUR NEXT TEST test strip    ferrous sulfate 325 (65 Fe) mg tablet    furosemide (LASIX) 40 mg tablet    hydrocortisone (ANUSOL-HC) 2 5 % rectal cream    hydrOXYzine HCL (ATARAX) 25 mg tablet    ibuprofen (MOTRIN) 800 mg tablet    insulin aspart (NovoLOG) 100 Units/mL injection pen    isosorbide mononitrate (IMDUR) 60 mg 24 hr tablet    ketoconazole (NIZORAL) 2 % shampoo    LINZESS 145 MCG CAPS    meclizine (ANTIVERT) 25 mg tablet    metoclopramide (REGLAN) 10 mg tablet    Multiple Vitamin (Daily-Zach) TABS    nitroglycerin (NITROSTAT) 0 4 mg SL tablet    NOVOLOG 100 UNIT/ML injection    pantoprazole (PROTONIX) 40 mg tablet    pantoprazole (PROTONIX) 40 mg tablet    paricalcitol (ZEMPLAR) 1 mcg capsule    Polyethylene Glycol 1000 POWD    Psyllium 400 MG CAPS    sucralfate (CARAFATE) 1 g/10 mL suspension    TRADJENTA 5 MG TABS     Allergies   Allergen Reactions    Calcium Itching       Vitals: Blood pressure 116/68, pulse 74, temperature 97 6 °F (36 4 °C), resp  rate (!) 24, weight 110 kg (243 lb 9 7 oz), SpO2 90 %  , 15L midflow and 15 L NRB, Body mass index is 41 82 kg/m²        Intake/Output Summary (Last 24 hours) at 11/19/2021 1602  Last data filed at 11/19/2021 5831  Gross per 24 hour   Intake 250 ml   Output --   Net 250 ml       Physical exam:    General Appearance:    Alert, cooperative, +_ conversational dyspnea no accessory     muscle use       Head/eyes:    Normocephalic, without obvious abnormality, atraumatic,         PERRL, extraocular muscles intact, no scleral icterus    Nose:   Nares normal, septum midline, mucosa normal, no drainage    or sinus tenderness   Throat:   Moist mucous membranes, no thrush   Neck:   Supple, trachea midline, no adenopathy; no carotid    bruit or JVD   Lungs:     Bibasilar rales   Chest Wall:    No tenderness or deformity    Heart:    Regular rate and rhythm, S1 and S2 normal, no murmur, rub   or gallop   Abdomen:     Soft, non-tender, bowel sounds active all four quadrants,     no masses, no organomegaly   Extremities:   Extremities normal, atraumatic, no cyanosis no edema   Skin:   Warm, dry, turgor normal, no rashes or lesions   Lymph nodes:   Cervical and supraclavicular nodes normal   Neurologic:   CNII-XII intact, normal strength, non-focal         Labs: I have personally reviewed pertinent lab results  , CBC:   Lab Results   Component Value Date    WBC 19 70 (H) 11/19/2021    HGB 13 0 11/19/2021    HCT 41 6 11/19/2021    MCV 79 (L) 11/19/2021     (L) 11/19/2021    MCH 24 5 (L) 11/19/2021    MCHC 31 3 (L) 11/19/2021    RDW 16 2 (H) 11/19/2021    MPV 10 2 11/19/2021   , CMP:   Lab Results   Component Value Date    SODIUM 129 (L) 11/19/2021    K 5 4 (H) 11/19/2021    CL 98 (L) 11/19/2021    CO2 22 11/19/2021    BUN 26 (H) 11/19/2021    CREATININE 2 13 (H) 11/19/2021    CALCIUM 8 2 (L) 11/19/2021    AST 80 (H) 11/19/2021    ALT 52 11/19/2021    ALKPHOS 165 (H) 11/19/2021    EGFR 31 11/19/2021       Imaging and other studies: I have personally reviewed pertinent reports  and I have personally reviewed pertinent films in PACS CXR 11/19/21    FINDINGS:     Mild cardiomegaly, CABG, coronary stent      Moderate bilateral groundglass opacity    No effusion or pneumothorax      Osseous structures appear within normal limits for patient age      IMPRESSION:     Moderate bilateral groundglass opacity due to Covid-19        Pulmonary function testing: none    Code Status: Level 1 - Full Code      CIELO Hernandez

## 2021-11-19 NOTE — H&P
[facility] 1955[facility] 77 [facility]  [facility] MRN: [id number]  Unit/Bed#: [facility] 68 2 -01 Encounter: [id number]  Primary Care Provider: India Cline MD   Date and time admitted to hospital: 11/19/2021  6:27 AM    * Acute hypoxemic respiratory failure due to COVID-19 [facility])  Assessment & Plan  24-year-old male, history of type 2 diabetes, CAD, hypertension presented with shortness of breath and cough  Patient found to be septic, tachypnea, leukocytosis  COVID positive 11/17, unvaccinated  Checks x-ray shows multifocal pneumonia  IV fluids, IV antibiotics Rocephin  Remdesivir, IV steroids, will consider adding baricitinib pending increasing O2  Encourage self prone in, incentive spirometry, ambulation as able  Will consult pulmonology for further recommendations  Patient appears hypovolemic, will hold Lasix at this time  Will check inflammatory markers    Sepsis due to COVID-19 [facility])  Assessment & Plan  Leukocytosis and tachypneic on admission in setting of COVID pneumonia  Treatment as above    HUBER (acute kidney injury) ([facility] [address] )  Assessment & Plan  Previous lab shows patient's baseline creatinine roughly around 1 8-2  Likely prerenal in setting of COVID, poor p o  Intake, mildly elevated CK  Gentle IV fluids  Monitor BMP      Essential hypertension  Assessment & Plan  Continue Norvasc, hold Lasix given HUBER  Continue monitor blood pressures    Hx of CABG  Assessment & Plan  History of CAD status post CABG and stents  Not on aspirin or beta blockers  Continue Imdur and statin    Type 2 diabetes mellitus, without long-term current use of insulin (HCC)  Assessment & Plan  Lab Results   Component Value Date    HGBA1C 8 0 (H) 11/04/2021     Given poor p o   Intake, will start patient on sliding scale and Accu-Cheks  Continue to monitor blood glucose while on steroids      VTE Prophylaxis: Heparin  / sequential compression device   Code Status: Full Code  POLST: There is no POLST form on file for this patient (pre-hospital)  Discussion with family: Patient, son on phone    Anticipated Length of Stay:  Patient will be admitted on an Inpatient basis with an anticipated length of stay of 2 midnights  Justification for Hospital Stay: COVID, IV abx, O2    Total Time for Visit, including Counseling / Coordination of Care: 45 minutes  Greater than 50% of this total time spent on direct patient counseling and coordination of care  Chief Complaint:   SOB, Cough    History of Present Illness:    Dc Atkins is a 77 y o  male who presents with shortness of breath and cough  Patient diagnosed with COVID pneumonia several days prior to admission  During that family has had all COVID  He was initially placed on 6 L oxygen in the ED but has continue to gone worse  He now requires 15 L of oxygen on non-rebreather  He reports symptoms of fevers, shortness of breath cough and body aches all over  He reports sputum production that has been greenish in reddish  He does have past medical history of type 2 diabetes, hypertension, CAD  Review of Systems:    Review of Systems   Constitutional: Positive for activity change, appetite change and fever  Negative for chills  HENT: Negative for congestion, sinus pressure and sore throat  Eyes: Negative for pain and discharge  Respiratory: Positive for cough, chest tightness and shortness of breath  Negative for wheezing  Cardiovascular: Negative for chest pain, palpitations and leg swelling  Gastrointestinal: Negative for abdominal distention, abdominal pain, blood in stool, diarrhea, nausea and vomiting  Endocrine: Negative for cold intolerance, heat intolerance, polydipsia, polyphagia and polyuria  Genitourinary: Negative for dysuria, frequency, hematuria and urgency  Musculoskeletal: Negative for arthralgias and back pain  Skin: Negative for color change and pallor  Neurological: Negative for seizures, syncope and weakness  Psychiatric/Behavioral: Negative for agitation and confusion  Past Medical and Surgical History:     Past Medical History:   Diagnosis Date    Chronic kidney disease     Coronary artery disease     Diabetes ([facility] [address] )     Diabetes mellitus ([facility] [address] )     GERD (gastroesophageal reflux disease)     Hypertension        Past Surgical History:   Procedure Laterality Date    CORONARY ARTERY BYPASS GRAFT  2008    MYRINGOTOMY W/ TUBES Bilateral 02/20/2019    [facility] - Dr Colonel Venegas - T tubes       Meds/Allergies:    Prior to Admission medications    Medication Sig Start Date End Date Taking? Authorizing Provider   acetaminophen (TYLENOL) 500 mg tablet Take 500 mg by mouth every 6 (six) hours    Historical Provider, MD   amLODIPine (NORVASC) 5 mg tablet Take 1 tablet (5 mg total) by mouth daily 5/4/21   Stephanie Eli MD   Artificial Tear Ointment (REFRESH LACRI-LUBE OP) Apply 1 drop to eye 4 (four) times a day    Historical Provider, MD   atorvastatin (LIPITOR) 80 mg tablet Take 40 mg by mouth 10/31/14   Historical Provider, MD   azithromycin (Zithromax) 250 mg tablet Take 2 tablets (500 mg total) by mouth daily for 1 day, THEN 1 tablet (250 mg total) daily for 4 days   11/15/21 11/20/21  Stephanie Eli MD   CARLEY MICROLET LANCETS lancets  6/22/18   Historical Provider, MD   cetirizine (ZyrTEC) 10 mg tablet TAKE 1 TABLET BY MOUTH EVERY DAY 1/28/20   Stephanie Eli MD   Cholecalciferol 2000 units CAPS Take 1 capsule by mouth    Historical Provider, MD   CONTOUR NEXT TEST test strip  6/22/18   Historical Provider, MD   ferrous sulfate 325 (65 Fe) mg tablet Take 325 mg by mouth 10/9/16   Historical Provider, MD   furosemide (LASIX) 40 mg tablet Take 0 5 tablets (20 mg total) by mouth daily 3/8/21   Stephanie Eli MD   hydrocortisone (ANUSOL-HC) 2 5 % rectal cream Apply topically 2 (two) times a day 8/12/21   Stephanie Eli MD   hydrOXYzine HCL (ATARAX) 25 mg tablet Take 1 tablet (25 mg total) by mouth every 6 (six) hours as needed for itching 12/16/19   Eunice Martin MD   ibuprofen (MOTRIN) 800 mg tablet Take 800 mg by mouth every 6 (six) hours 4/30/18   Historical Provider, MD   insulin aspart (NovoLOG) 100 Units/mL injection pen Inject under the skin 12/6/12   Historical Provider, MD   isosorbide mononitrate (IMDUR) 60 mg 24 hr tablet Take 1 tablet (60 mg total) by mouth every morning 1/7/21   Eunice Martin MD   ketoconazole (NIZORAL) 2 % shampoo  6/22/18   Historical Provider, MD   LINZESS [address] [place] [address]  6/22/18   Historical Provider, MD   meclizine (ANTIVERT) 25 mg tablet Take 1 tablet (25 mg total) by mouth every 8 (eight) hours as needed for dizziness 9/17/19   Eunice Martin MD   metoclopramide (REGLAN) 10 mg tablet Take 0 5 tablets (5 mg total) by mouth 4 (four) times a day 3/8/21   Eunice Martin MD   Multiple Vitamin (Daily-Zach) TABS Take 1 tablet by mouth daily 7/28/21   Eunice Martin MD   nitroglycerin (NITROSTAT) 0 4 mg SL tablet PLACE 1 TABLET BY SUBLINGUAL ROUTE EVERY 5 MINUTES AS NEEDED FOR CHEST PAIN  DO NOT EXCEED 3 DOSES IN 15 MINUTES  4/11/20   Eunice Martin MD   NOVOLOG 100 UNIT/ML injection  6/22/18   Historical Provider, MD   pantoprazole (PROTONIX) 40 mg tablet Take 40 mg by mouth 12/5/17   Historical Provider, MD   pantoprazole (PROTONIX) 40 mg tablet Take 1 tablet (40 mg total) by mouth 2 (two) times a day 4/30/19   Zoila Smith DO   paricalcitol (ZEMPLAR) 1 mcg capsule Take 1 mcg by mouth 2/5/18   Historical Provider, MD   Polyethylene Glycol 1000 POWD Take 17 g by mouth 4/19/16   Historical Provider, MD   Psyllium 400 MG CAPS Take 0 4 g by mouth 3/26/18 3/26/19  Historical Provider, MD   sucralfate (CARAFATE) 1 g/10 mL suspension Take 10 mL (1 g total) by mouth 4 (four) times a day 4/30/19   Zoila Smith DO   TRADJENTA 5 MG TABS  6/22/18   Historical Provider, MD     I have reviewed home medications with patient personally  Allergies:    Allergies   Allergen Reactions    Calcium Itching       Social History:     Marital Status: /Civil Union   Occupation:   Patient Pre-hospital Living Situation: Home  Patient Pre-hospital Level of Mobility: Ambulatory  Patient Pre-hospital Diet Restrictions: None  Substance Use History:   Social History     Substance and Sexual Activity   Alcohol Use No     Social History     Tobacco Use   Smoking Status Never Smoker   Smokeless Tobacco Never Used     Social History     Substance and Sexual Activity   Drug Use No       Family History:    History reviewed  No pertinent family history  Physical Exam:     Vitals:   Blood Pressure: 158/68 (11/19/21 1019)  Pulse: 55 (11/19/21 1019)  Temperature: 97 9 °F (36 6 °C) (11/19/21 0631)  Temp Source: Oral (11/19/21 0631)  Respirations: 22 (11/19/21 0730)  Weight - Scale: 110 kg (243 lb 9 7 oz) (11/19/21 0636)  SpO2: 93 % (11/19/21 1019)    Physical Exam  Vitals and nursing note reviewed  Constitutional:       General: He is in acute distress  Appearance: He is ill-appearing  HENT:      Head: Normocephalic and atraumatic  Eyes:      General: No scleral icterus  Conjunctiva/sclera: Conjunctivae normal    Cardiovascular:      Rate and Rhythm: Normal rate and regular rhythm  Heart sounds: Normal heart sounds  Pulmonary:      Effort: Respiratory distress present  Breath sounds: Rhonchi present  No wheezing  Abdominal:      General: Bowel sounds are normal  There is no distension  Palpations: Abdomen is soft  Tenderness: There is no abdominal tenderness  Musculoskeletal:         General: No swelling  Right lower leg: No edema  Left lower leg: No edema  Skin:     General: Skin is warm and dry  Neurological:      General: No focal deficit present  Mental Status: He is alert and oriented to person, place, and time  Mental status is at baseline  Additional Data:     Lab Results: I have personally reviewed pertinent reports        Results from last 7 days   Lab Units 11/19/21  0652   WBC Thousand/uL 19 70*   HEMOGLOBIN g/dL 13 0   HEMATOCRIT % 41 6   PLATELETS Thousands/uL 106*   BANDS PCT % 35*   LYMPHO PCT % 11*   MONO PCT % 1*   EOS PCT % 0     Results from last 7 days   Lab Units 11/19/21  0652   SODIUM mmol/L 129*   POTASSIUM mmol/L 5 4*   CHLORIDE mmol/L 98*   CO2 mmol/L 22   BUN mg/dL 26*   CREATININE mg/dL 2 13*   ANION GAP mmol/L 9   CALCIUM mg/dL 8 2*   ALBUMIN g/dL 2 5*   TOTAL BILIRUBIN mg/dL 0 78   ALK PHOS U/L 165*   ALT U/L 52   AST U/L 80*   GLUCOSE RANDOM mg/dL 123     Results from last 7 days   Lab Units 11/19/21  0652   INR  1 08             Results from last 7 days   Lab Units 11/19/21  0846 11/19/21  0652   LACTIC ACID mmol/L 2 3* 2 3*       Imaging: I have personally reviewed pertinent reports  XR chest 1 view portable   Final Result by Deniz Bowen MD (11/19 2724)      Moderate bilateral groundglass opacity due to Covid-19  Workstation performed: [id number]             EKG, Pathology, and Other Studies Reviewed on Admission:   · EKG: NSR    Allscripts / Epic Records Reviewed: Yes     ** Please Note: This note has been constructed using a voice recognition system   **

## 2021-11-19 NOTE — ED PROVIDER NOTES
History  Chief Complaint   Patient presents with    Shortness of Breath     diagnosed with Covid on monday; pt is experiencing worsening SOB and coughing; RA 70-80%     This is a 69-year-old male patient was diagnosed with COVID-19 5 days ago  His entire family has COVID  Today he became increased shortness of breath  He was 70 % on room air  Placed on 6 liters/minute nasal cannula oxygen moved up to 88%  Patient was given mid flow  He states that he keeps coughing in his chest is sore but does not complain of chest pain  He speaks a Urdu however is able to communicate in [place]  He has had fevers cough shortness of breath, body aches diffuse headache  Nothing makes it better or worse he has taken some aspirin over-the-counter for fever  He has not had his COVID vaccine according to the patient  At this time patient be placed on mid flow and the COVID protocol will be carried out patient will be admitted  Prior to Admission Medications   Prescriptions Last Dose Informant Patient Reported? Taking?    Artificial Tear Ointment (REFRESH LACRI-LUBE OP)  Self Yes No   Sig: Apply 1 drop to eye 4 (four) times a day   CARLEY MICROLET LANCETS lancets  Self Yes No   CONTOUR NEXT TEST test strip  Self Yes No   Cholecalciferol 2000 units CAPS  Self Yes No   Sig: Take 1 capsule by mouth   LINZESS 145 MCG CAPS  Self Yes No   Multiple Vitamin (Daily-Zach) TABS   No No   Sig: Take 1 tablet by mouth daily   NOVOLOG 100 UNIT/ML injection  Self Yes No   Polyethylene Glycol 1000 POWD  Self Yes No   Sig: Take 17 g by mouth   Psyllium 400 MG CAPS  Self Yes No   Sig: Take 0 4 g by mouth   TRADJENTA 5 MG TABS  Self Yes No   acetaminophen (TYLENOL) 500 mg tablet  Self Yes No   Sig: Take 500 mg by mouth every 6 (six) hours   amLODIPine (NORVASC) 5 mg tablet   No No   Sig: Take 1 tablet (5 mg total) by mouth daily   atorvastatin (LIPITOR) 80 mg tablet  Self Yes No   Sig: Take 40 mg by mouth   azithromycin (Zithromax) 250 mg tablet   No No   Sig: Take 2 tablets (500 mg total) by mouth daily for 1 day, THEN 1 tablet (250 mg total) daily for 4 days  cetirizine (ZyrTEC) 10 mg tablet  Self No No   Sig: TAKE 1 TABLET BY MOUTH EVERY DAY   ferrous sulfate 325 (65 Fe) mg tablet  Self Yes No   Sig: Take 325 mg by mouth   furosemide (LASIX) 40 mg tablet   No No   Sig: Take 0 5 tablets (20 mg total) by mouth daily   hydrOXYzine HCL (ATARAX) 25 mg tablet  Self No No   Sig: Take 1 tablet (25 mg total) by mouth every 6 (six) hours as needed for itching   hydrocortisone (ANUSOL-HC) 2 5 % rectal cream   No No   Sig: Apply topically 2 (two) times a day   ibuprofen (MOTRIN) 800 mg tablet  Self Yes No   Sig: Take 800 mg by mouth every 6 (six) hours   insulin aspart (NovoLOG) 100 Units/mL injection pen  Self Yes No   Sig: Inject under the skin   isosorbide mononitrate (IMDUR) 60 mg 24 hr tablet   No No   Sig: Take 1 tablet (60 mg total) by mouth every morning   ketoconazole (NIZORAL) 2 % shampoo  Self Yes No   meclizine (ANTIVERT) 25 mg tablet  Self No No   Sig: Take 1 tablet (25 mg total) by mouth every 8 (eight) hours as needed for dizziness   metoclopramide (REGLAN) 10 mg tablet   No No   Sig: Take 0 5 tablets (5 mg total) by mouth 4 (four) times a day   nitroglycerin (NITROSTAT) 0 4 mg SL tablet  Self No No   Sig: PLACE 1 TABLET BY SUBLINGUAL ROUTE EVERY 5 MINUTES AS NEEDED FOR CHEST PAIN   DO NOT EXCEED 3 DOSES IN 15 MINUTES    pantoprazole (PROTONIX) 40 mg tablet  Self Yes No   Sig: Take 40 mg by mouth   pantoprazole (PROTONIX) 40 mg tablet  Self No No   Sig: Take 1 tablet (40 mg total) by mouth 2 (two) times a day   paricalcitol (ZEMPLAR) 1 mcg capsule  Self Yes No   Sig: Take 1 mcg by mouth   sucralfate (CARAFATE) 1 g/10 mL suspension  Self No No   Sig: Take 10 mL (1 g total) by mouth 4 (four) times a day      Facility-Administered Medications: None       Past Medical History:   Diagnosis Date    Chronic kidney disease     Coronary artery disease     Diabetes ([facility] )     Diabetes mellitus ([facility] )     GERD (gastroesophageal reflux disease)     Hypertension        Past Surgical History:   Procedure Laterality Date    CORONARY ARTERY BYPASS GRAFT  2008    MYRINGOTOMY W/ TUBES Bilateral 02/20/2019    [facility] - Dr Meghan Sadler - T tubes       History reviewed  No pertinent family history  I have reviewed and agree with the history as documented  E-Cigarette/Vaping    E-Cigarette Use Never User      E-Cigarette/Vaping Substances     Social History     Tobacco Use    Smoking status: Never Smoker    Smokeless tobacco: Never Used   Vaping Use    Vaping Use: Never used   Substance Use Topics    Alcohol use: No    Drug use: No       Review of Systems   Constitutional: Positive for chills and fever  Negative for diaphoresis and fatigue  HENT: Negative for congestion, ear pain, nosebleeds and sore throat  Eyes: Negative for photophobia, pain, discharge and visual disturbance  Respiratory: Positive for cough and shortness of breath  Negative for choking, chest tightness and wheezing  Cardiovascular: Negative for chest pain and palpitations  Gastrointestinal: Negative for abdominal distention, abdominal pain, diarrhea and vomiting  Genitourinary: Negative for dysuria, flank pain, frequency and hematuria  Musculoskeletal: Positive for myalgias  Negative for arthralgias, back pain, gait problem and joint swelling  Skin: Negative for color change and rash  Neurological: Positive for headaches  Negative for dizziness, tremors, seizures, syncope, facial asymmetry, speech difficulty, light-headedness and numbness  Psychiatric/Behavioral: Negative for behavioral problems and confusion  The patient is not nervous/anxious  All other systems reviewed and are negative  Physical Exam  Physical Exam  Vitals and nursing note reviewed  Constitutional:       General: He is in acute distress  Appearance: Normal appearance   He is well-developed  He is not toxic-appearing or diaphoretic  HENT:      Head: Normocephalic and atraumatic  Right Ear: Tympanic membrane, ear canal and external ear normal       Left Ear: Tympanic membrane, ear canal and external ear normal       Nose: Nose normal       Mouth/Throat:      Mouth: Mucous membranes are moist       Pharynx: Oropharynx is clear  No oropharyngeal exudate or posterior oropharyngeal erythema  Eyes:      General: No scleral icterus  Right eye: No discharge  Left eye: No discharge  Conjunctiva/sclera: Conjunctivae normal       Pupils: Pupils are equal, round, and reactive to light  Cardiovascular:      Rate and Rhythm: Normal rate and regular rhythm  Pulmonary:      Effort: Respiratory distress present  Breath sounds: Rales present  Chest:      Chest wall: No tenderness  Abdominal:      General: Bowel sounds are normal       Palpations: Abdomen is soft  Tenderness: There is no abdominal tenderness  Musculoskeletal:         General: Normal range of motion  Cervical back: Normal range of motion and neck supple  Right lower leg: No edema  Left lower leg: No edema  Skin:     General: Skin is warm  Capillary Refill: Capillary refill takes less than 2 seconds  Neurological:      General: No focal deficit present  Mental Status: He is alert and oriented to person, place, and time  Mental status is at baseline     Psychiatric:         Mood and Affect: Mood normal          Behavior: Behavior normal          Vital Signs  ED Triage Vitals [11/19/21 0631]   Temperature Pulse Respirations Blood Pressure SpO2   97 9 °F (36 6 °C) 56 (!) 26 162/71 92 %      Temp Source Heart Rate Source Patient Position - Orthostatic VS BP Location FiO2 (%)   Oral Monitor Lying Right arm --      Pain Score       7           Vitals:    11/19/21 0631 11/19/21 0730 11/19/21 1019 11/19/21 1432   BP: 162/71 155/71 158/68 116/68   Pulse: 56 58 55 74 Patient Position - Orthostatic VS: Lying Lying           Visual Acuity      ED Medications  Medications   remdesivir (Veklury) 200 mg in sodium chloride 0 9 % 290 mL IVPB (0 mg Intravenous Stopped 11/19/21 1509)     Followed by   remdesivir Mari Shorts) 100 mg in sodium chloride 0 9 % 270 mL IVPB (has no administration in time range)   atorvastatin (LIPITOR) tablet 40 mg (has no administration in time range)   dexamethasone (DECADRON) injection 6 mg (6 mg Intravenous Given 11/19/21 0921)   ceftriaxone (ROCEPHIN) 1 g/50 mL in dextrose IVPB (has no administration in time range)   doxycycline hyclate (VIBRAMYCIN) capsule 100 mg (has no administration in time range)   heparin (porcine) subcutaneous injection 5,000 Units (5,000 Units Subcutaneous Given 11/19/21 1438)   sodium chloride 0 9 % infusion (75 mL/hr Intravenous New Bag 11/19/21 0930)   insulin lispro (HumaLOG) 100 units/mL subcutaneous injection 1-5 Units (0 Units Subcutaneous Hold 11/19/21 0912)   amLODIPine (NORVASC) tablet 5 mg (has no administration in time range)   loratadine (CLARITIN) tablet 10 mg (10 mg Oral Given 11/19/21 1227)   isosorbide mononitrate (IMDUR) 24 hr tablet 60 mg (has no administration in time range)   pantoprazole (PROTONIX) EC tablet 40 mg (has no administration in time range)   dexamethasone (DECADRON) injection 6 mg (6 mg Intravenous Given 11/19/21 0653)   ceftriaxone (ROCEPHIN) 1 g/50 mL in dextrose IVPB (0 mg Intravenous Stopped 11/19/21 0824)   doxycycline hyclate (VIBRAMYCIN) capsule 100 mg (100 mg Oral Given 11/19/21 0724)   acetaminophen (TYLENOL) tablet 650 mg (650 mg Oral Given 11/19/21 0752)   sodium chloride 0 9 % bolus 250 mL (250 mL Intravenous New Bag 11/19/21 0925)       Diagnostic Studies  Results Reviewed     Procedure Component Value Units Date/Time    CKMB [[id number]]  (Normal) Collected: 11/19/21 0652    Lab Status: Final result Specimen: Blood from Arm, Right Updated: 11/19/21 1309     CK-MB Index <1 0 % CK-MB 3 0 ng/mL     Procalcitonin with AM Reflex [[id number]]  (Abnormal) Collected: 11/19/21 0652    Lab Status: Final result Specimen: Blood from Arm, Right Updated: 11/19/21 1258     Procalcitonin 24 66 ng/ml     HS Troponin I 4hr [[id number]] Collected: 11/19/21 1112    Lab Status: Final result Specimen: Blood from Arm, Left Updated: 11/19/21 1240     hs TnI 4hr 36 ng/L      Delta 4hr hsTnI -6 ng/L     Blood culture #1 [[id number]] Collected: 11/19/21 7217    Lab Status: Preliminary result Specimen: Blood from Arm, Right Updated: 11/19/21 1101     Blood Culture Received in Microbiology Lab  Culture in Progress  Blood culture #2 [[id number]] Collected: 11/19/21 0650    Lab Status: Preliminary result Specimen: Blood from Arm, Left Updated: 11/19/21 1101     Blood Culture Received in Microbiology Lab  Culture in Progress  Ferritin [[id number]]  (Abnormal) Collected: 11/19/21 0652    Lab Status: Final result Specimen: Blood from Arm, Right Updated: 11/19/21 1016     Ferritin 567 ng/mL     HS Troponin I 2hr [[id number]] Collected: 11/19/21 0846    Lab Status: Final result Specimen: Blood from Arm, Left Updated: 11/19/21 0952     hs TnI 2hr 37 ng/L      Delta 2hr hsTnI -5 ng/L     Lactic acid 2 Hours [[id number]]  (Abnormal) Collected: 11/19/21 0846    Lab Status: Final result Specimen: Blood from Arm, Left Updated: 11/19/21 0940     LACTIC ACID 2 3 mmol/L     Narrative:      Result may be elevated if tourniquet was used during collection      Manual Differential(PHLEBS Do Not Order) [[id number]]  (Abnormal) Collected: 11/19/21 0652    Lab Status: Final result Specimen: Blood from Arm, Right Updated: 11/19/21 0845     Segmented % 49 %      Bands % 35 %      Lymphocytes % 11 %      Monocytes % 1 %      Eosinophils, % 0 %      Basophils % 0 %      Metamyelocytes% 4 %      Absolute Neutrophils 16 55 Thousand/uL      Lymphocytes Absolute 2 17 Thousand/uL      Monocytes Absolute 0 20 Thousand/uL      Eosinophils Absolute 0 00 Thousand/uL      Basophils Absolute 0 00 Thousand/uL      Total Counted --     Anisocytosis Present     Platelet Estimate Borderline    NT-BNP PRO [[id number]]  (Abnormal) Collected: 11/19/21 0652    Lab Status: Final result Specimen: Blood from Arm, Right Updated: 11/19/21 0840     NT-proBNP 3,590 pg/mL     CK (with reflex to MB) [[id number]]  (Abnormal) Collected: 11/19/21 0652    Lab Status: Final result Specimen: Blood from Arm, Right Updated: 11/19/21 0840     Total CK 1,386 U/L     Comprehensive metabolic panel [[id number]]  (Abnormal) Collected: 11/19/21 0652    Lab Status: Final result Specimen: Blood from Arm, Right Updated: 11/19/21 0840     Sodium 129 mmol/L      Potassium 5 4 mmol/L      Chloride 98 mmol/L      CO2 22 mmol/L      ANION GAP 9 mmol/L      BUN 26 mg/dL      Creatinine 2 13 mg/dL      Glucose 123 mg/dL      Calcium 8 2 mg/dL      Corrected Calcium 9 4 mg/dL      AST 80 U/L      ALT 52 U/L      Alkaline Phosphatase 165 U/L      Total Protein 6 6 g/dL      Albumin 2 5 g/dL      Total Bilirubin 0 78 mg/dL      eGFR 31 ml/min/1 73sq m     Narrative:      [place] guidelines for Chronic Kidney Disease (CKD):     Stage 1 with normal or high GFR (GFR > 90 mL/min/1 73 square meters)    Stage 2 Mild CKD (GFR = 60-89 mL/min/1 73 square meters)    Stage 3A Moderate CKD (GFR = 45-59 mL/min/1 73 square meters)    Stage 3B Moderate CKD (GFR = 30-44 mL/min/1 73 square meters)    Stage 4 Severe CKD (GFR = 15-29 mL/min/1 73 square meters)    Stage 5 End Stage CKD (GFR <15 mL/min/1 73 square meters)  Note: GFR calculation is accurate only with a steady state creatinine    Magnesium [[id number]]  (Normal) Collected: 11/19/21 0652    Lab Status: Final result Specimen: Blood from Arm, Right Updated: 11/19/21 0840     Magnesium 1 6 mg/dL     Triglycerides [[id number]]  (Normal) Collected: 11/19/21 3766    Lab Status: Final result Specimen: Blood from Arm, Right Updated: 11/19/21 0840 Triglycerides 109 mg/dL     C-reactive protein [[id number]]  (Abnormal) Collected: 11/19/21 0652    Lab Status: Final result Specimen: Blood from Arm, Right Updated: 11/19/21 0833      6 mg/L     Lactic acid, plasma [[id number]]  (Abnormal) Collected: 11/19/21 2382    Lab Status: Final result Specimen: Blood from Arm, Right Updated: 11/19/21 0811     LACTIC ACID 2 3 mmol/L     Narrative:      Result may be elevated if tourniquet was used during collection  D-dimer, quantitative [[id number]]  (Abnormal) Collected: 11/19/21 0652    Lab Status: Final result Specimen: Blood from Arm, Right Updated: 11/19/21 0732     D-Dimer, Quant 1 21 ug/ml FEU     HS Troponin 0hr (reflex protocol) [[id number]]  (Normal) Collected: 11/19/21 0652    Lab Status: Final result Specimen: Blood from Arm, Right Updated: 11/19/21 0728     hs TnI 0hr 42 ng/L     Protime-INR [[id number]]  (Normal) Collected: 11/19/21 0652    Lab Status: Final result Specimen: Blood from Arm, Right Updated: 11/19/21 0724     Protime 13 7 seconds      INR 1 08    CBC and differential [[id number]]  (Abnormal) Collected: 11/19/21 0652    Lab Status: Final result Specimen: Blood from Arm, Right Updated: 11/19/21 0723     WBC 19 70 Thousand/uL      RBC 5 30 Million/uL      Hemoglobin 13 0 g/dL      Hematocrit 41 6 %      MCV 79 fL      MCH 24 5 pg      MCHC 31 3 g/dL      RDW 16 2 %      MPV 10 2 fL      Platelets 265 Thousands/uL     Narrative: This is an appended report  These results have been appended to a previously verified report  Calcium, ionized [[id number]]  (Abnormal) Collected: 11/19/21 0652    Lab Status: Final result Specimen: Blood from Arm, Right Updated: 11/19/21 0705     Calcium, Ionized 1 11 mmol/L     Glucose 6 phosphate dehydrogenase [[id number]] Collected: 11/19/21 6414    Lab Status:  In process Specimen: Blood from Arm, Right Updated: 11/19/21 0700                 XR chest 1 view portable   Final Result by Kirsty Sexton MD (11/19 9844)      Moderate bilateral groundglass opacity due to Covid-19  Workstation performed: [id number]                    Procedures  Procedures         ED Course  ED Course as of 11/19/21 1512   Fri Nov 19, 2021   8672 According age adjusted D-dimer VTE is unlikely   0814 Sepsis alert called                                             Initial Sepsis Screening     Row Name 11/19/21 0813                Is the patient's history suggestive of a new or worsening infection? Yes (Proceed)  -DD        Suspected source of infection pneumonia  -DD        Are two or more of the following signs & symptoms of infection both present and new to the patient? --        Indicate SIRS criteria Tachypnea > 20 resp per min;Leukocytosis (WBC > 07773 IJL)  -DD        If the answer is yes to both questions, suspicion of sepsis is present --        If severe sepsis is present AND tissue hypoperfusion perists in the hour after fluid resuscitation or lactate > 4, the patient meets criteria for SEPTIC SHOCK --        Are any of the following organ dysfunction criteria present within 6 hours of suspected infection and SIRS criteria that are NOT considered to be chronic conditions?  --        Organ dysfunction Creatinine > 2 0 mg/dL  -DD        Date of presentation of severe sepsis --        Time of presentation of severe sepsis --        Tissue hypoperfusion persists in the hour after crystalloid fluid administration, evidenced, by either: --        Was hypotension present within one hour of the conclusion of crystalloid fluid administration? --        Date of presentation of septic shock --        Time of presentation of septic shock --              User Key  (r) = Recorded By, (t) = Taken By, (c) = Cosigned By    [address] Name Provider Type    DD Harsh Ivy PA-C Physician [facility] (last 720 hours)     Sepsis Reassess     [address] Name 11/19/21 0840                   Repeat Volume Status and Tissue Perfusion Assessment Performed    Repeat Volume Status and Tissue Perfusion Assessment Performed Yes  -DD                  Volume Status and Tissue Perfusion Post Fluid Resuscitation * Must Document All *    Vital Signs Reviewed (HR, RR, BP, T) Yes  -DD        Shock Index Reviewed --        Arterial Oxygen Saturation Reviewed (POx, SaO2 or SpO2) --        Cardio Regular rate and rhythm  -DD        Pulmonary Normal effort  -DD        Capillary Refill Brisk  -DD        Peripheral Pulses Radial;Dorsalis Pedis  -DD        Peripheral Pulse +2  -DD        Dorsalis Pedis +2  -DD        Skin Warm  -DD        Urine output assessed Adequate  -DD                  *OR*   Intensive Monitoring- Must Document One of the Following Four *:    Vital Signs Reviewed --        * Central Venous Pressure (CVP or RAP) --        * Central Venous Oxygen (SVO2, ScvO2 or Oxygen saturation via central catheter) --        * Bedside Cardiovascular US in IVC diameter and % collapse --        * Passive Leg Raise OR Crystalloid Challenge --              User Key  (r) = Recorded By, (t) = Taken By, (c) = Cosigned By    Initials Name Provider Type    DD Lorelee Bloch, PA-C Physician Assistant              SBIRT 20yo+      Most Recent Value   SBIRT (25 yo +)    In order to provide better care to our patients, we are screening all of our patients for alcohol and drug use  Would it be okay to ask you these screening questions?  No Filed at: 11/19/2021 0445                    MDM    Disposition  Final diagnoses:   Hypoxia   Dyspnea   Sepsis ([facility] )   COVID-19   Pneumonia due to COVID-19 virus     Time reflects when diagnosis was documented in both MDM as applicable and the Disposition within this note     Time User Action Codes Description Comment    11/19/2021  8:18 AM Maco Shields Add [R09 02] Hypoxia     11/19/2021  8:18 AM DinMaco toro Add [R06 00] Dyspnea     11/19/2021  8:18 AM DinMaco toro Add [A41 9] Sepsis ([facility] )     11/19/2021  8:19 AM Maco Shields Add [U07 1] COVID-19     11/19/2021  8:19 AM Maco Shields Add [U07 1,  J12 82] Pneumonia due to COVID-19 virus       ED Disposition     ED Disposition Condition Date/Time Comment    Admit Stable Fri Nov 19, 2021  8:18 AM Case was discussed with Dr Goyo Cristina and the patient's admission status was agreed to be Admission Status: inpatient status to the service of Dr Goyo Cristina   Follow-up Information    None         Current Discharge Medication List      CONTINUE these medications which have NOT CHANGED    Details   acetaminophen (TYLENOL) 500 mg tablet Take 500 mg by mouth every 6 (six) hours      amLODIPine (NORVASC) 5 mg tablet Take 1 tablet (5 mg total) by mouth daily  Qty: 90 tablet, Refills: 3    Associated Diagnoses: Medicine refill      Artificial Tear Ointment (REFRESH LACRI-LUBE OP) Apply 1 drop to eye 4 (four) times a day      atorvastatin (LIPITOR) 80 mg tablet Take 40 mg by mouth      azithromycin (Zithromax) 250 mg tablet Take 2 tablets (500 mg total) by mouth daily for 1 day, THEN 1 tablet (250 mg total) daily for 4 days    Qty: 6 tablet, Refills: 0    Associated Diagnoses: Viral infection, unspecified      CARLEY MICROLET LANCETS lancets       cetirizine (ZyrTEC) 10 mg tablet TAKE 1 TABLET BY MOUTH EVERY DAY  Qty: 30 tablet, Refills: 0    Associated Diagnoses: Rash      Cholecalciferol 2000 units CAPS Take 1 capsule by mouth      CONTOUR NEXT TEST test strip       ferrous sulfate 325 (65 Fe) mg tablet Take 325 mg by mouth      furosemide (LASIX) 40 mg tablet Take 0 5 tablets (20 mg total) by mouth daily  Qty: 30 tablet, Refills: 3    Associated Diagnoses: Medication refill      hydrocortisone (ANUSOL-HC) 2 5 % rectal cream Apply topically 2 (two) times a day  Qty: 60 g, Refills: 3    Associated Diagnoses: Hemorrhoids, unspecified hemorrhoid type      hydrOXYzine HCL (ATARAX) 25 mg tablet Take 1 tablet (25 mg total) by mouth every 6 (six) hours as needed for itching  Qty: 30 tablet, Refills: 0    Associated Diagnoses: Rash      ibuprofen (MOTRIN) 800 mg tablet Take 800 mg by mouth every 6 (six) hours  Refills: 0      insulin aspart (NovoLOG) 100 Units/mL injection pen Inject under the skin      isosorbide mononitrate (IMDUR) 60 mg 24 hr tablet Take 1 tablet (60 mg total) by mouth every morning  Qty: 90 tablet, Refills: 1    Associated Diagnoses: Atherosclerosis of native coronary artery with angina pectoris, unspecified whether native or transplanted heart (HCC)      ketoconazole (NIZORAL) 2 % shampoo       LINZESS 145 MCG CAPS       meclizine (ANTIVERT) 25 mg tablet Take 1 tablet (25 mg total) by mouth every 8 (eight) hours as needed for dizziness  Qty: 30 tablet, Refills: 0    Associated Diagnoses: Vertigo      metoclopramide (REGLAN) 10 mg tablet Take 0 5 tablets (5 mg total) by mouth 4 (four) times a day  Qty: 120 tablet, Refills: 3    Associated Diagnoses: Medication refill      Multiple Vitamin (Daily-Zach) TABS Take 1 tablet by mouth daily  Qty: 90 tablet, Refills: 3    Associated Diagnoses: Medicine refill      nitroglycerin (NITROSTAT) 0 4 mg SL tablet PLACE 1 TABLET BY SUBLINGUAL ROUTE EVERY 5 MINUTES AS NEEDED FOR CHEST PAIN  DO NOT EXCEED 3 DOSES IN 15 MINUTES    Qty: 50 tablet, Refills: 5    Associated Diagnoses: Heart disease      NOVOLOG 100 UNIT/ML injection       !! pantoprazole (PROTONIX) 40 mg tablet Take 40 mg by mouth      !! pantoprazole (PROTONIX) 40 mg tablet Take 1 tablet (40 mg total) by mouth 2 (two) times a day  Qty: 60 tablet, Refills: 0    Associated Diagnoses: Peptic ulcer symptoms      paricalcitol (ZEMPLAR) 1 mcg capsule Take 1 mcg by mouth      Polyethylene Glycol 1000 POWD Take 17 g by mouth      Psyllium 400 MG CAPS Take 0 4 g by mouth      sucralfate (CARAFATE) 1 g/10 mL suspension Take 10 mL (1 g total) by mouth 4 (four) times a day  Qty: 420 mL, Refills: 0    Associated Diagnoses: Peptic ulcer symptoms      TRADJENTA 5 MG TABS !! - Potential duplicate medications found  Please discuss with provider  No discharge procedures on file      PDMP Review     None          ED Provider  Electronically Signed by           Sybil Hazel PA-C  11/19/21 6904

## 2021-11-19 NOTE — SEPSIS NOTE
Sepsis Note   Rosaura Pastor 77 y o  male MRN: [id number]  Unit/Bed#: ED 20 Encounter: [id number]       qSOFA     Row Name 11/19/21 0730 11/19/21 0631             Altered mental status GCS < 15 -- --       Respiratory Rate > / =43 1 1       Systolic BP < / =488 0 0       Q Sofa Score 1 1                  Initial Sepsis Screening     Row Name 11/19/21 0813                Is the patient's history suggestive of a new or worsening infection? Yes (Proceed)  -DD        Suspected source of infection pneumonia  -DD        Are two or more of the following signs & symptoms of infection both present and new to the patient? --        Indicate SIRS criteria Tachypnea > 20 resp per min;Leukocytosis (WBC > 63123 IJL)  -DD        If the answer is yes to both questions, suspicion of sepsis is present --        If severe sepsis is present AND tissue hypoperfusion perists in the hour after fluid resuscitation or lactate > 4, the patient meets criteria for SEPTIC SHOCK --        Are any of the following organ dysfunction criteria present within 6 hours of suspected infection and SIRS criteria that are NOT considered to be chronic conditions?  --        Organ dysfunction Creatinine > 2 0 mg/dL  -DD        Date of presentation of severe sepsis --        Time of presentation of severe sepsis --        Tissue hypoperfusion persists in the hour after crystalloid fluid administration, evidenced, by either: --        Was hypotension present within one hour of the conclusion of crystalloid fluid administration? --        Date of presentation of septic shock --        Time of presentation of septic shock --              User Key  (r) = Recorded By, (t) = Taken By, (c) = Cosigned By    [address] Name Provider Type    DD Henry Joe PA-C Physician Assistant

## 2021-11-20 PROBLEM — R78.81 GRAM-POSITIVE BACTEREMIA: Status: ACTIVE | Noted: 2021-01-01

## 2021-11-20 PROBLEM — E87.5 HYPERKALEMIA: Status: ACTIVE | Noted: 2021-01-01

## 2021-11-20 NOTE — ASSESSMENT & PLAN NOTE
One of 2 blood cultures growing Gram-positive cocci  Suspect possible contaminant, will continue to monitor on IV Rocephin

## 2021-11-20 NOTE — PLAN OF CARE
Problem: PAIN - ADULT  Goal: Verbalizes/displays adequate comfort level or baseline comfort level  Description: Interventions:  - Encourage patient to monitor pain and request assistance  - Assess pain using appropriate pain scale  - Administer analgesics based on type and severity of pain and evaluate response  - Implement non-pharmacological measures as appropriate and evaluate response  - Consider cultural and social influences on pain and pain management  - Notify physician/advanced practitioner if interventions unsuccessful or patient reports new pain  Outcome: Progressing     Problem: INFECTION - ADULT  Goal: Absence or prevention of progression during hospitalization  Description: INTERVENTIONS:  - Assess and monitor for signs and symptoms of infection  - Monitor lab/diagnostic results  - Monitor all insertion sites, i e  indwelling lines, tubes, and drains  - Monitor endotracheal if appropriate and nasal secretions for changes in amount and color  - [place] appropriate cooling/warming therapies per order  - Administer medications as ordered  - Instruct and encourage patient and family to use good hand hygiene technique  - Identify and instruct in appropriate isolation precautions for identified infection/condition  Outcome: Progressing     Problem: SAFETY ADULT  Goal: Patient will remain free of falls  Description: INTERVENTIONS:  - Educate patient/family on patient safety including physical limitations  - Instruct patient to call for assistance with activity   - Consult OT/PT to assist with strengthening/mobility   - Keep Call bell within reach  - Keep bed low and locked with side rails adjusted as appropriate  - Keep care items and personal belongings within reach  - Initiate and maintain comfort rounds  - Make Fall Risk Sign visible to staff  - Offer Toileting every 2 Hours, in advance of need  - Initiate/Maintain alarm  - Obtain necessary fall risk management equipment  - Apply yellow socks and bracelet for high fall risk patients  - Consider moving patient to room near nurses station  Outcome: Progressing  Goal: Maintain or return to baseline ADL function  Description: INTERVENTIONS:  -  Assess patient's ability to carry out ADLs; assess patient's baseline for ADL function and identify physical deficits which impact ability to perform ADLs (bathing, care of mouth/teeth, toileting, grooming, dressing, etc )  - Assess/evaluate cause of self-care deficits   - Assess range of motion  - Assess patient's mobility; develop plan if impaired  - Assess patient's need for assistive devices and provide as appropriate  - Encourage maximum independence but intervene and supervise when necessary  - Involve family in performance of ADLs  - Assess for home care needs following discharge   - Consider OT consult to assist with ADL evaluation and planning for discharge  - Provide patient education as appropriate  Outcome: Progressing  Goal: Maintains/Returns to pre admission functional level  Description: INTERVENTIONS:  - Perform BMAT or MOVE assessment daily    - Set and communicate daily mobility goal to care team and patient/family/caregiver  - Collaborate with rehabilitation services on mobility goals if consulted  - Perform Range of Motion 3 times a day  - Reposition patient every 3 hours    - Dangle patient 3 times a day  - Stand patient 3 times a day  - Ambulate patient 3 times a day  - Out of bed to chair 3 times a day   - Out of bed for meals 3 times a day  - Out of bed for toileting  - Record patient progress and toleration of activity level   Outcome: Progressing     Problem: RESPIRATORY - ADULT  Goal: Achieves optimal ventilation and oxygenation  Description: INTERVENTIONS:  - Assess for changes in respiratory status  - Assess for changes in mentation and behavior  - Position to facilitate oxygenation and minimize respiratory effort  - Oxygen administered by appropriate delivery if ordered  - Initiate smoking cessation education as indicated  - Encourage broncho-pulmonary hygiene including cough, deep breathe, Incentive Spirometry  - Assess the need for suctioning and aspirate as needed  - Assess and instruct to report SOB or any respiratory difficulty  - Respiratory Therapy support as indicated  Outcome: Progressing     Problem: Potential for Falls  Goal: Patient will remain free of falls  Description: INTERVENTIONS:  - Educate patient/family on patient safety including physical limitations  - Instruct patient to call for assistance with activity   - Consult OT/PT to assist with strengthening/mobility   - Keep Call bell within reach  - Keep bed low and locked with side rails adjusted as appropriate  - Keep care items and personal belongings within reach  - Initiate and maintain comfort rounds  - Make Fall Risk Sign visible to staff  - Offer Toileting every 2 Hours, in advance of need  - Initiate/Maintain alarm  - Obtain necessary fall risk management equipment  - Apply yellow socks and bracelet for high fall risk patients  - Consider moving patient to room near nurses station  Outcome: Progressing     Problem: CARDIOVASCULAR - ADULT  Goal: Maintains optimal cardiac output and hemodynamic stability  Description: INTERVENTIONS:  - Monitor I/O, vital signs and rhythm  - Monitor for S/S and trends of decreased cardiac output  - Administer and titrate ordered vasoactive medications to optimize hemodynamic stability  - Assess quality of pulses, skin color and temperature  - Assess for signs of decreased coronary artery perfusion  - Instruct patient to report change in severity of symptoms  Outcome: Progressing     Problem: GENITOURINARY - ADULT  Goal: Maintains or returns to baseline urinary function  Description: INTERVENTIONS:  - Assess urinary function  - Encourage oral fluids to ensure adequate hydration if ordered  - Administer IV fluids as ordered to ensure adequate hydration  - Administer ordered medications as needed  - Offer frequent toileting  - Follow urinary retention protocol if ordered  Outcome: Progressing     Problem: METABOLIC, FLUID AND ELECTROLYTES - ADULT  Goal: Electrolytes maintained within normal limits  Description: INTERVENTIONS:  - Monitor labs and assess patient for signs and symptoms of electrolyte imbalances  - Administer electrolyte replacement as ordered  - Monitor response to electrolyte replacements, including repeat lab results as appropriate  - Instruct patient on fluid and nutrition as appropriate  Outcome: Progressing  Goal: Fluid balance maintained  Description: INTERVENTIONS:  - Monitor labs   - Monitor I/O and WT  - Instruct patient on fluid and nutrition as appropriate  - Assess for signs & symptoms of volume excess or deficit  Outcome: Progressing  Goal: Glucose maintained within target range  Description: INTERVENTIONS:  - Monitor Blood Glucose as ordered  - Assess for signs and symptoms of hyperglycemia and hypoglycemia  - Administer ordered medications to maintain glucose within target range  - Assess nutritional intake and initiate nutrition service referral as needed  Outcome: Progressing     Problem: MUSCULOSKELETAL - ADULT  Goal: Maintain or return mobility to safest level of function  Description: INTERVENTIONS:  - Assess patient's ability to carry out ADLs; assess patient's baseline for ADL function and identify physical deficits which impact ability to perform ADLs (bathing, care of mouth/teeth, toileting, grooming, dressing, etc )  - Assess/evaluate cause of self-care deficits   - Assess range of motion  - Assess patient's mobility  - Assess patient's need for assistive devices and provide as appropriate  - Encourage maximum independence but intervene and supervise when necessary  - Involve family in performance of ADLs  - Assess for home care needs following discharge   - Consider OT consult to assist with ADL evaluation and planning for discharge  - Provide patient education as appropriate  Outcome: Progressing

## 2021-11-20 NOTE — ASSESSMENT & PLAN NOTE
68-year-old male, history of type 2 diabetes, CAD, hypertension presented with shortness of breath and cough  Patient found to be septic, tachypnea, leukocytosis  COVID positive 11/17, unvaccinated  Checks x-ray shows multifocal pneumonia  IV fluids, IV antibiotics Rocephin, procalcitonin significantly elevated, concern for superimposed bacterial infection  Remdesivir, IV steroids, will add baricitnib  Encourage self prone in, incentive spirometry, ambulation as able  Pulmonary following

## 2021-11-20 NOTE — ASSESSMENT & PLAN NOTE
Lab Results   Component Value Date    HGBA1C 8 0 (H) 11/04/2021     Given poor p o   Intake, will start patient on sliding scale and Accu-Cheks  Continue to monitor blood glucose while on steroids    (P) 192 8

## 2021-11-20 NOTE — PLAN OF CARE
Problem: PAIN - ADULT  Goal: Verbalizes/displays adequate comfort level or baseline comfort level  Description: Interventions:  - Encourage patient to monitor pain and request assistance  - Assess pain using appropriate pain scale  - Administer analgesics based on type and severity of pain and evaluate response  - Implement non-pharmacological measures as appropriate and evaluate response  - Consider cultural and social influences on pain and pain management  - Notify physician/advanced practitioner if interventions unsuccessful or patient reports new pain  Outcome: Progressing     Problem: INFECTION - ADULT  Goal: Absence or prevention of progression during hospitalization  Description: INTERVENTIONS:  - Assess and monitor for signs and symptoms of infection  - Monitor lab/diagnostic results  - Monitor all insertion sites, i e  indwelling lines, tubes, and drains  - Monitor endotracheal if appropriate and nasal secretions for changes in amount and color  - [place] appropriate cooling/warming therapies per order  - Administer medications as ordered  - Instruct and encourage patient and family to use good hand hygiene technique  - Identify and instruct in appropriate isolation precautions for identified infection/condition  Outcome: Progressing     Problem: SAFETY ADULT  Goal: Patient will remain free of falls  Description: INTERVENTIONS:  - Educate patient/family on patient safety including physical limitations  - Instruct patient to call for assistance with activity   - Consult OT/PT to assist with strengthening/mobility   - Keep Call bell within reach  - Keep bed low and locked with side rails adjusted as appropriate  - Keep care items and personal belongings within reach  - Initiate and maintain comfort rounds  - Make Fall Risk Sign visible to staff  - Offer Toileting every **2* Hours, in advance of need  - Initiate/Maintain *bed**alarm  - Apply yellow socks and bracelet for high fall risk patients  - Consider moving patient to room near nurses station  Outcome: Progressing  Goal: Maintain or return to baseline ADL function  Description: INTERVENTIONS:  -  Assess patient's ability to carry out ADLs; assess patient's baseline for ADL function and identify physical deficits which impact ability to perform ADLs (bathing, care of mouth/teeth, toileting, grooming, dressing, etc )  - Assess/evaluate cause of self-care deficits   - Assess range of motion  - Assess patient's mobility; develop plan if impaired  - Assess patient's need for assistive devices and provide as appropriate  - Encourage maximum independence but intervene and supervise when necessary  - Involve family in performance of ADLs  - Assess for home care needs following discharge   - Consider OT consult to assist with ADL evaluation and planning for discharge  - Provide patient education as appropriate  Outcome: Progressing  Goal: Maintains/Returns to pre admission functional level  Description: INTERVENTIONS:  - Perform BMAT or MOVE assessment daily    - Set and communicate daily mobility goal to care team and patient/family/caregiver  - Collaborate with rehabilitation services on mobility goals if consulted  - Perform Range of Motion **3* times a day  - Reposition patient every *2** hours    - Dangle patient *3** times a day  - Stand patient *3** times a day  - Ambulate patient *3** times a day  - Out of bed to chair *3** times a day   - Out of bed for meals **3* times a day  - Out of bed for toileting  - Record patient progress and toleration of activity level   Outcome: Progressing     Problem: RESPIRATORY - ADULT  Goal: Achieves optimal ventilation and oxygenation  Description: INTERVENTIONS:  - Assess for changes in respiratory status  - Assess for changes in mentation and behavior  - Position to facilitate oxygenation and minimize respiratory effort  - Oxygen administered by appropriate delivery if ordered  - Initiate smoking cessation education as indicated  - Encourage broncho-pulmonary hygiene including cough, deep breathe, Incentive Spirometry  - Assess the need for suctioning and aspirate as needed  - Assess and instruct to report SOB or any respiratory difficulty  - Respiratory Therapy support as indicated  Outcome: Progressing

## 2021-11-20 NOTE — CONSULTS
Consultation - Nephrology   Maria Elena Finnegan 77 y o  male MRN: [id number]  Unit/Bed#: [place] [address] [id number] Encounter: [id number]    Parts of the exam were done by primary/nursing service and were discussed with our team due to infection control prevention measures related to COVID 19  ASSESSMENT/PLAN:  HUBER (POA) on CKD IIIB:  Suspected septic ATN in the setting of COVID-19 pneumonia  Also concern for volume overload  NSAIDs on outpatient medication list   -baseline creatinine 1 8-2 1 per care everywhere    -follows with Dr Lilia Palma  -presents with creatinine of 2 13 with rise today to 2 71   -received 250 cc of NSS 11/19    -CK level elevated 1386  Will repeat    -will check urinalysis  -previous SPEP negative for monoclonal gammopathy   -previous imaging in 2019 showed mild fullness of both renal collecting systems in both ureters without obstructive calculi  Consider checking renal ultrasound if renal function continues to worsen  -received Lasix 40 mg IV x1   -may give diuretics as needed  -recommend avoiding nephrotoxins, hypotension, IV contrast   -strict I/O  Hyperkalemia:  Potassium level 5 5 in non hemolyzed specimen   -recommend low-potassium diet  received Lasix x 1 11/19   -will treat with insulin and dextrose combination    -avoid potassium containing supplements  Hyponatremia:  Suspect SIADH versus hypervolemia as well as hyperglycemia contributing   -placed on fluid restriction  -will check secondary workup  Non gap acidosis: In the setting of worsening renal failure   -will continue to monitor and replace as needed    -will start oral replacement    -trending lactic acid  Hypertension:  Blood pressure overall acceptable   -home medications:  Amlodipine 5 mg daily, isosorbide 60 mg daily  -recommend avoiding hypotension or high fluctuations in blood pressure   -recommend holding parameters antihypertensive for systolic blood pressure less than 130 mmHg       Acute on chronic congestive heart failure:  -checking repeat echocardiogram   -may benefit from cardiology consult   -received Lasix 40 mg IV x1   -outpatient diuretic:  Lasix 20 mg b i d  -diuretics currently on hold  May give as needed    -check daily weights, fluid restriction, I/O  Acute hypoxic respiratory failure:  Secondary to below   -continues on supplemental oxygen to maintain sats greater than 88%  -pulmonary team following   -continues on antibiotics  Sepsis/COVID-19 infection/pneumonia:  Further care per primary care team     Other:  History of CABG, diabetes  HISTORY OF PRESENT ILLNESS:  Requesting Physician: Denys Griffith MD  Reason for Consult: HUBER (POA)     Antione Aguero is a 77y o  year old male with past medical history of CKD stage IIIB, hypertension, CHF, diabetes, CABG, nonobstructed renal calculi, who was admitted to [address] [place] [address] after presenting with complaints of shortness of breath associated with cough  The patient was recently diagnosed with COVID 19 several days ago  He also reports generalized body aches  He denies nausea, vomiting, diarrhea  A renal consultation is requested today for assistance in the management of acute kidney injury  The patient follows with Dr Carol Duenas  Per Care everywhere baseline creatinine 1 8-2 1  His Lasix was recently increased to 20 mg b i d  Due to lower extremity edema  It was felt that amlodipine was also contributing to this  Information above obtained from chart      PAST MEDICAL HISTORY:  Past Medical History:   Diagnosis Date    Chronic kidney disease     Coronary artery disease     [facility] )     [facility]     GERD (gastroesophageal reflux disease)     Hypertension        PAST SURGICAL HISTORY:  Past Surgical History:   Procedure Laterality Date    CORONARY ARTERY BYPASS GRAFT  2008    MYRINGOTOMY W/ TUBES Bilateral 02/20/2019    [facility] - Dr Lackey Nail - T tubes       ALLERGIES:  Allergies   Allergen Reactions    Calcium Itching SOCIAL HISTORY:  Social History     Substance and Sexual Activity   Alcohol Use No     Social History     Substance and Sexual Activity   Drug Use No     Social History     Tobacco Use   Smoking Status Never Smoker   Smokeless Tobacco Never Used       FAMILY HISTORY:  History reviewed  No pertinent family history      MEDICATIONS:    Current Facility-Administered Medications:     acetaminophen (TYLENOL) tablet 650 mg, 650 mg, Oral, Q6H PRN, Hafsa Adhikari MD    amLODIPine (NORVASC) tablet 5 mg, 5 mg, Oral, Daily, Hafsa Adhikari MD, 5 mg at 11/20/21 0813    atorvastatin (LIPITOR) tablet 40 mg, 40 mg, Oral, HS, Hafsa Adhikari MD, 40 mg at 11/19/21 2117    ceftriaxone (ROCEPHIN) 1 g/50 mL in dextrose IVPB, 1,000 mg, Intravenous, Q24H, Hafsa Adhikari MD, Stopped at 11/20/21 0943    dexamethasone (DECADRON) 11 1 mg in sodium chloride 0 9 % 50 mL IVPB, 0 1 mg/kg, Intravenous, Q12H [facility], CIELO Carrillo, Stopped at 11/20/21 0844    doxycycline hyclate (VIBRAMYCIN) capsule 100 mg, 100 mg, Oral, Q12H, Hafsa Adhikari MD, 100 mg at 11/20/21 0813    guaiFENesin (MUCINEX) 12 hr tablet 600 mg, 600 mg, Oral, Q12H [facility], Hafsa Adhikari MD, 600 mg at 11/20/21 0813    heparin (porcine) subcutaneous injection 5,000 Units, 5,000 Units, Subcutaneous, Q8H [facility], Hafsa Adhikari MD, 5,000 Units at 11/20/21 0537    hydrocodone-chlorpheniramine polistirex (TUSSIONEX) ER suspension 5 mL, 5 mL, Oral, Q12H PRN, Hafsa Adhikari MD, 5 mL at 11/20/21 0813    hydrOXYzine HCL (ATARAX) tablet 25 mg, 25 mg, Oral, Q6H PRN, CIELO Osuna, 25 mg at 11/20/21 0842    insulin lispro (HumaLOG) 100 units/mL subcutaneous injection 1-5 Units, 1-5 Units, Subcutaneous, TID AC, 1 Units at 11/20/21 0814 **AND** Fingerstick Glucose (POCT), , , TID ACYfn MD    isosorbide mononitrate (IMDUR) 24 hr tablet 60 mg, 60 mg, Oral, QAM, Hafsa Adhikari MD, 60 mg at 11/20/21 0813    loratadine (CLARITIN) tablet 10 mg, 10 mg, Oral, Daily, Ishan Smart MD, 10 mg at 11/20/21 0813    melatonin tablet 6 mg, 6 mg, Oral, HS, CIELO Osuna, 6 mg at 11/20/21 0232    oxyCODONE (ROXICODONE) IR tablet 5 mg, 5 mg, Oral, Q4H PRN, Ishan Smart MD, 5 mg at 11/20/21 0620    pantoprazole (PROTONIX) EC tablet 40 mg, 40 mg, Oral, Early Morning, Ishan Smart MD, 40 mg at 11/20/21 0537    [COMPLETED] remdesivir (Veklury) 200 mg in sodium chloride 0 9 % 290 mL IVPB, 200 mg, Intravenous, Q24H, Stopped at 11/19/21 1509 **FOLLOWED BY** remdesivir (Veklury) 100 mg in sodium chloride 0 9 % 270 mL IVPB, 100 mg, Intravenous, Q24H, Ishan Smart MD, 100 mg at 11/20/21 0944    tiZANidine (ZANAFLEX) tablet 4 mg, 4 mg, Oral, Q8H PRN, Ishan Smart MD, 4 mg at 11/19/21 1833     Parts of the exam were done by primary/nursing service and were discussed with our team due to infection control prevention measures related to COVID 19      REVIEW OF SYSTEMS:  Unable to complete  Spoke with over the phone  Reports body aches and SOB, He reports eating okay  He reports urinating less  He remains on oxygen       PHYSICAL EXAM:  Current Weight: Weight - Scale: 110 kg (243 lb 9 7 oz)  First Weight: Weight - Scale: 110 kg (243 lb 9 7 oz)  Vitals:    11/19/21 2242 11/20/21 0538 11/20/21 0700 11/20/21 0748   BP: 129/69 128/68  141/66   BP Location:    Right arm   Pulse: 69 66 67 70   Resp:    19   Temp: (!) 75 2 °F (24 °C) 99 2 °F (37 3 °C)  98 9 °F (37 2 °C)   TempSrc:    Oral   SpO2: 92% (!) 89% (!) 89% 96%   Weight:           Intake/Output Summary (Last 24 hours) at 11/20/2021 1059  Last data filed at 11/20/2021 0943  Gross per 24 hour   Intake 180 ml   Output 600 ml   Net -420 ml     General: NAD, viewed through window and spoke with over the phone  Skin: warm, dry  HEENT: Moist mucous membranes, sclera anicteric, normocephalic, atraumatic  Neck: No apparent JVD appreciated  Chest: on O2  CVS: Regular rate and rhythm, no murmer   Abdomen: Soft, round, non-tender, +BS  Extremities: No B/L LE edema present  Neuro: alert and oriented  Psych: appropriate mood and affect     Invasive Devices:      Lab Results:   Results from last 7 days   Lab Units 11/20/21  0443 11/19/21  0652   WBC Thousand/uL 17 84* 19 70*   HEMOGLOBIN g/dL 12 6 13 0   HEMATOCRIT % 39 7 41 6   PLATELETS Thousands/uL 113* 106*   SODIUM mmol/L 130* 129*   POTASSIUM mmol/L 5 5* 5 4*   CHLORIDE mmol/L 99* 98*   CO2 mmol/L 20* 22   BUN mg/dL 40* 26*   CREATININE mg/dL 2 71* 2 13*   CALCIUM mg/dL 8 1* 8 2*   MAGNESIUM mg/dL  --  1 6   ALK PHOS U/L 117* 165*   ALT U/L 42 52   AST U/L 84* 80*

## 2021-11-20 NOTE — ASSESSMENT & PLAN NOTE
Previous lab shows patient's baseline creatinine roughly around 1 8-2  Likely prerenal in setting of COVID, poor p o   Intake, mildly elevated CK  Nephrology consulted, appreciate recommendations

## 2021-11-20 NOTE — PROGRESS NOTES
[address] [place] [address]  Progress Note - Hollace Ahumada 1955, 77 y o  male MRN: [id number]  Unit/Bed#: [place] 68 2 [facility] 87 [id number] Encounter: [id number]  Primary Care Provider: Joselyn Orellana MD   Date and time admitted to hospital: 11/19/2021  6:27 AM    * Acute hypoxemic respiratory failure due to COVID-19 [facility])  Assessment & Plan  64-year-old male, history of type 2 diabetes, CAD, hypertension presented with shortness of breath and cough  Patient found to be septic, tachypnea, leukocytosis  COVID positive 11/17, unvaccinated  Checks x-ray shows multifocal pneumonia  IV fluids, IV antibiotics Rocephin, procalcitonin significantly elevated, concern for superimposed bacterial infection  Remdesivir, IV steroids, will add baricitnib  Encourage self prone in, incentive spirometry, ambulation as able  Pulmonary following    Hyperkalemia  Assessment & Plan  Hyperkalemia in the setting of HUBER  Treated with IV insulin, continue monitor BMP    Gram-positive bacteremia  Assessment & Plan  One of 2 blood cultures growing Gram-positive cocci  Suspect possible contaminant, will continue to monitor on IV Rocephin    Sepsis due to COVID-19 [facility])  Assessment & Plan  Leukocytosis and tachypneic on admission in setting of COVID pneumonia  Treatment as above    HUBER (acute kidney injury) ([facility] [address] )  Assessment & Plan  Previous lab shows patient's baseline creatinine roughly around 1 8-2  Likely prerenal in setting of COVID, poor p o  Intake, mildly elevated CK  Nephrology consulted, appreciate recommendations      Essential hypertension  Assessment & Plan  Continue Norvasc, hold Lasix given HUBER    Hx of CABG  Assessment & Plan  History of CAD status post CABG and stents  Not on aspirin or beta blockers  Continue Imdur and statin    Type 2 diabetes mellitus, without long-term current use of insulin (HCC)  Assessment & Plan  Lab Results   Component Value Date    HGBA1C 8 0 (H) 11/04/2021     Given poor p o   Intake, will start patient on sliding scale and Accu-Cheks  Continue to monitor blood glucose while on steroids    (P) 192 8      VTE Pharmacologic Prophylaxis:   Pharmacologic: Heparin  Mechanical VTE Prophylaxis in Place: Yes    Patient Centered Rounds: I have performed bedside rounds with nursing staff today  Discussions with Specialists or Other Care Team Provider: Pulmonary, Nephro    Education and Discussions with Family / Patient: Patient, son on phone    Time Spent for Care: 30 minutes  More than 50% of total time spent on counseling and coordination of care as described above  Current Length of Stay: 1 day(s)    Current Patient Status: Inpatient   Certification Statement: The patient will continue to require additional inpatient hospital stay due to Resp failure, IV abx    Discharge Plan: Pending    Code Status: Level 1 - Full Code      Subjective:   No events overnight  Anxiety improving with atarax  Poor appetite, has not yet eaten or drinking very much  Reports his chest pain has improved from yesterday  Objective:     Vitals:   Temp (24hrs), Av 8 °F (34 3 °C), Min:75 2 °F (24 °C), Max:99 2 °F (37 3 °C)    Temp:  [75 2 °F (24 °C)-99 2 °F (37 3 °C)] 98 °F (36 7 °C)  HR:  [63-86] 86  Resp:  [18-22] 22  BP: (128-144)/(55-69) 136/55  SpO2:  [76 %-96 %] 90 %  Body mass index is 41 82 kg/m²  Input and Output Summary (last 24 hours): Intake/Output Summary (Last 24 hours) at 2021 1514  Last data filed at 2021 1301  Gross per 24 hour   Intake 610 ml   Output 975 ml   Net -365 ml       Physical Exam:     Physical Exam  Vitals and nursing note reviewed  Constitutional:       Appearance: Normal appearance  He is ill-appearing  HENT:      Head: Normocephalic and atraumatic  Eyes:      General: No scleral icterus  Conjunctiva/sclera: Conjunctivae normal    Cardiovascular:      Rate and Rhythm: Normal rate and regular rhythm  Heart sounds: Normal heart sounds     Pulmonary:      Breath sounds: Rhonchi present  No wheezing  Abdominal:      General: Bowel sounds are normal  There is no distension  Palpations: Abdomen is soft  Tenderness: There is no abdominal tenderness  Musculoskeletal:         General: No swelling  Right lower leg: No edema  Left lower leg: No edema  Skin:     General: Skin is warm and dry  Neurological:      General: No focal deficit present  Mental Status: He is alert  Mental status is at baseline  Additional Data:     Labs:    Results from last 7 days   Lab Units 11/20/21  0443 11/19/21  0652 11/19/21  0652   WBC Thousand/uL 17 84*   < > 19 70*   HEMOGLOBIN g/dL 12 6   < > 13 0   HEMATOCRIT % 39 7   < > 41 6   PLATELETS Thousands/uL 113*   < > 106*   BANDS PCT %  --   --  35*   LYMPHO PCT %  --   --  11*   MONO PCT %  --   --  1*   EOS PCT %  --   --  0    < > = values in this interval not displayed  Results from last 7 days   Lab Units 11/20/21  0443   SODIUM mmol/L 130*   POTASSIUM mmol/L 5 5*   CHLORIDE mmol/L 99*   CO2 mmol/L 20*   BUN mg/dL 40*   CREATININE mg/dL 2 71*   ANION GAP mmol/L 11   CALCIUM mg/dL 8 1*   ALBUMIN g/dL 2 0*   TOTAL BILIRUBIN mg/dL 0 57   ALK PHOS U/L 117*   ALT U/L 42   AST U/L 84*   GLUCOSE RANDOM mg/dL 208*     Results from last 7 days   Lab Units 11/19/21  0652   INR  1 08     Results from last 7 days   Lab Units 11/20/21  1105 11/20/21  0746 11/20/21  0544 11/19/21  1648 11/19/21  1130   POC GLUCOSE mg/dl 195* 205* 200* 232* 132         Results from last 7 days   Lab Units 11/20/21  0443 11/19/21  0846 11/19/21  0652   LACTIC ACID mmol/L  --  2 3* 2 3*   PROCALCITONIN ng/ml 36 64*  --  24 66*           * I Have Reviewed All Lab Data Listed Above  * Additional Pertinent Lab Tests Reviewed: [address] Admission Reviewed    Imaging:    XR chest 1 view portable    Result Date: 11/19/2021  Impression: Moderate bilateral groundglass opacity due to Covid-19   Workstation performed: [id number]       Recent Cultures (last 7 days):     Results from last 7 days   Lab Units 11/19/21  0652 11/19/21  0650   BLOOD CULTURE   --  No Growth at 24 hrs  GRAM STAIN RESULT  Gram positive cocci in clusters*  --        Last 24 Hours Medication List:   Current Facility-Administered Medications   Medication Dose Route Frequency Provider Last Rate    acetaminophen  650 mg Oral Q6H PRN Wesly Sherman MD     Heidi Mendoza ON 11/21/2021] amLODIPine  5 mg Oral Daily CIELO Clark      atorvastatin  40 mg Oral HS Johnnie Severo Ing, MD      Baricitinib  1 mg Oral Q24H Wesly Sherman MD      cefTRIAXone  1,000 mg Intravenous Q24H Wesly Sherman MD Stopped (11/20/21 0943)    dexamethasone  0 1 mg/kg Intravenous Q12H [address] CIELO Del Toro Stopped (11/20/21 1818)    doxycycline hyclate  100 mg Oral Q12H Wesly Sherman MD      guaiFENesin  600 mg Oral Q12H [address] Wesly Sherman MD      heparin (porcine)  5,000 Units Subcutaneous [facility] Wesly Sherman MD      hydrocodone-chlorpheniramine polistirex  5 mL Oral Q12H PRN Sindi Leak Severo Ing, MD      hydrOXYzine HCL  25 mg Oral Q6H PRN CIELO Arrieta      insulin lispro  1-5 Units Subcutaneous TID AC Wesly Sherman MD      [START ON 11/21/2021] isosorbide mononitrate  60 mg Oral QAM CIELO Clark      loratadine  10 mg Oral Daily Wesly Sherman MD      melatonin  6 mg Oral HS CIELO Arrieta      oxyCODONE  5 mg Oral Q4H PRN Wesly Sherman MD      pantoprazole  40 mg Oral Early Morning Wesly Sherman MD      remdesivir  100 mg Intravenous Q24H Wesly Sherman MD Stopped (11/20/21 1014)    sodium bicarbonate  650 mg Oral BID after meals CIELO Clark      tiZANidine  4 mg Oral Q8H PRN Wesly Sherman MD          Today, Patient Was Seen By: Wesly Sherman MD    ** Please Note: Dictation voice to text software may have been used in the creation of this document   **

## 2021-11-20 NOTE — PROGRESS NOTES
Progress Note - Pulmonary   Terrence Torre 77 y o  male MRN: [id number]  Unit/Bed#: [place] [id number] -[id number] Encounter: [id number]      Assessment/Plan:         1  Acute hypoxic respiratory failure secondary to COVID 19  1  Titrate oxygen as needed to maintain SpO2 >/=88%  2  Pulmonary toilet: IS, cough deep breathe  3  Side lying and prone position  4  Current O2 needs: 15 L midflow and NRB  2  COVID 19 moderate protocol started  1  Recommendations  1  Follow echo, hold additional diuretics  2  Labs  1   6--215 3  2  Procalcitonin 24 66   3  BNP 3,590  4  Troponin  normal   5  HIV NA  6  hepatitis panel NA   3  Medications  1  lipitor  2  Anticoagulation DVT prophylaxis  3  Remdesivir day 2/5  4  decadron dosing 0 1mg/kg BID  2/10  5  Diuretics hold due to increased creatinine today  6  Barcintinab-will likely start today  7  Convalsecent plasma NA  8  Antibiotics: continue rocephin and doxycycline and follow PCT  3  Acute CHF  1  Follow echocardiogram  2  Hold diuretics due to HUBER  3  May benefit from cardiology evaluation pending echo results          Subjective:     Gustavo Hernandez  Was seen lying in bed on his right side upon entering the room  Denies acute events  Continues report chest discomfort, dyspnea and cough  Objective:         Vitals: Blood pressure 141/66, pulse 70, temperature 98 9 °F (37 2 °C), temperature source Oral, resp  rate 19, weight 110 kg (243 lb 9 7 oz), SpO2 96 %  , 15L Midflow and NRB, Body mass index is 41 82 kg/m²        Intake/Output Summary (Last 24 hours) at 11/20/2021 1026  Last data filed at 11/20/2021 0943  Gross per 24 hour   Intake 180 ml   Output 600 ml   Net -420 ml         Physical Exam  Gen: Awake, alert, oriented x 3, no acute distress  HEENT: Mucous membranes moist, no oral lesions, no thrush  NECK: no accessory muscle use, JVP not elevated  Cardiac: Regular, single S1, single S2, no murmurs, no rubs, no gallops  Lungs: decreased clear breath sounds  Abdomen: normoactive bowel sounds, soft nontender, nondistended, no rebound or rigidity, no guarding  Extremities: no cyanosis, no clubbing, no edema    Labs: I have personally reviewed pertinent lab results  , CBC:   Lab Results   Component Value Date    WBC 17 84 (H) 11/20/2021    HGB 12 6 11/20/2021    HCT 39 7 11/20/2021    MCV 78 (L) 11/20/2021     (L) 11/20/2021    MCH 24 7 (L) 11/20/2021    MCHC 31 7 11/20/2021    RDW 16 2 (H) 11/20/2021    MPV 11 4 11/20/2021    NRBC 0 11/20/2021   , CMP:   Lab Results   Component Value Date    SODIUM 130 (L) 11/20/2021    K 5 5 (H) 11/20/2021    CL 99 (L) 11/20/2021    CO2 20 (L) 11/20/2021    BUN 40 (H) 11/20/2021    CREATININE 2 71 (H) 11/20/2021    CALCIUM 8 1 (L) 11/20/2021    AST 84 (H) 11/20/2021    ALT 42 11/20/2021    ALKPHOS 117 (H) 11/20/2021    EGFR 23 11/20/2021     Imaging and other studies: none      CIELO Rapp

## 2021-11-21 NOTE — PROGRESS NOTES
[address] [place] [address]  Progress Note - John Ibarra 1955, 77 y o  male MRN: [id number]  [facility] 216-01 Encounter: [id number]  Primary Care Provider: Mannie Sky MD   Date and time admitted to hospital: 11/19/2021  6:27 AM    * Acute hypoxemic respiratory failure due to COVID-19 [facility])  Assessment & Plan  38-year-old male, history of type 2 diabetes, CAD, hypertension presented with shortness of breath and cough  Patient found to be septic, tachypnea, leukocytosis  COVID positive 11/17, unvaccinated  Checks x-ray shows multifocal pneumonia  IV fluids, IV antibiotics Rocephin, procalcitonin significantly elevated, concern for superimposed bacterial infection  Remdesivir, IV steroids, baricitnib  Encourage self prone in, incentive spirometry, ambulation as able  Pulmonary following    Hyperkalemia  Assessment & Plan  Hyperkalemia in the setting of HUBER  Will increase insulin, continue to monitor BMP    Gram-positive bacteremia  Assessment & Plan  One of 2 blood cultures growing Gram-positive cocci  Suspect possible contaminant, will continue to monitor on IV Rocephin    Sepsis due to COVID-19 [facility])  Assessment & Plan  Leukocytosis and tachypneic on admission in setting of COVID pneumonia  Treatment as above    HUBER (acute kidney injury) ([facility] [address] )  Assessment & Plan  Previous lab shows patient's baseline creatinine roughly around 1 8-2  Likely prerenal in setting of COVID, poor p o   Intake, mildly elevated CK  Nephrology consulted, appreciate recommendations  Slight improvement today in renal functions    Essential hypertension  Assessment & Plan  Continue Norvasc, hold Lasix given HUBER    Hx of CABG  Assessment & Plan  History of CAD status post CABG and stents  Not on aspirin or beta blockers  Continue Imdur and statin    Type 2 diabetes mellitus, without long-term current use of insulin [facility])  Assessment & Plan  Lab Results   Component Value Date    HGBA1C 8 0 (H) 11/04/2021 Improving appetite, hyperglycemia secondary to steroids  Will increase Lantus to 20 units, at 5 units with meals  Titrate insulin as needed, Accu-Cheks and sliding scale    (P) [id number]        VTE Pharmacologic Prophylaxis:   Pharmacologic: Heparin  Mechanical VTE Prophylaxis in Place: Yes    Patient Centered Rounds: I have performed bedside rounds with nursing staff today  Discussions with Specialists or Other Care Team Provider: Pulmonology    Education and Discussions with Family / Patient: Patient    Time Spent for Care: 30 minutes  More than 50% of total time spent on counseling and coordination of care as described above  Current Length of Stay: 2 day(s)    Current Patient Status: Inpatient   Certification Statement: The patient will continue to require additional inpatient hospital stay due to Resp Failure, IV abx    Discharge Plan: Pending    Code Status: Level 1 - Full Code      Subjective:   No events overnight  Patient reports his chest pain has some improvement  Appetite has improved, eating more  Objective:     Vitals:   Temp (24hrs), Av 9 °F (36 6 °C), Min:97 4 °F (36 3 °C), Max:98 4 °F (36 9 °C)    Temp:  [97 4 °F (36 3 °C)-98 4 °F (36 9 °C)] 97 4 °F (36 3 °C)  HR:  [75-97] 92  Resp:  [19-24] 24  BP: (107-164)/(47-74) 107/65  SpO2:  [86 %-95 %] 87 %  Body mass index is 41 54 kg/m²  Input and Output Summary (last 24 hours): Intake/Output Summary (Last 24 hours) at 2021 1502  Last data filed at 2021 0946  Gross per 24 hour   Intake --   Output 375 ml   Net -375 ml       Physical Exam:     Physical Exam  Vitals and nursing note reviewed  Constitutional:       Appearance: Normal appearance  He is ill-appearing  HENT:      Head: Normocephalic and atraumatic  Eyes:      General: No scleral icterus  Conjunctiva/sclera: Conjunctivae normal    Cardiovascular:      Rate and Rhythm: Normal rate and regular rhythm        Heart sounds: Normal heart sounds  Pulmonary:      Breath sounds: Rhonchi present  No wheezing  Abdominal:      General: Bowel sounds are normal  There is no distension  Palpations: Abdomen is soft  Tenderness: There is no abdominal tenderness  Musculoskeletal:         General: No swelling  Right lower leg: No edema  Left lower leg: No edema  Skin:     General: Skin is warm and dry  Neurological:      General: No focal deficit present  Mental Status: He is alert  Mental status is at baseline  Additional Data:     Labs:    Results from last 7 days   Lab Units 11/21/21  0452 11/20/21  0443 11/19/21  0652   WBC Thousand/uL 14 47*   < > 19 70*   HEMOGLOBIN g/dL 12 6   < > 13 0   HEMATOCRIT % 39 4   < > 41 6   PLATELETS Thousands/uL 125*   < > 106*   BANDS PCT %  --   --  35*   NEUTROS PCT % 89*  --   --    LYMPHS PCT % 6*  --   --    LYMPHO PCT %  --   --  11*   MONOS PCT % 4  --   --    MONO PCT %  --   --  1*   EOS PCT % 0  --  0    < > = values in this interval not displayed  Results from last 7 days   Lab Units 11/21/21  0452   SODIUM mmol/L 132*   POTASSIUM mmol/L 5 4*   CHLORIDE mmol/L 100   CO2 mmol/L 21   BUN mg/dL 55*   CREATININE mg/dL 2 46*   ANION GAP mmol/L 11   CALCIUM mg/dL 8 3   ALBUMIN g/dL 2 0*   TOTAL BILIRUBIN mg/dL 0 65   ALK PHOS U/L 151*   ALT U/L 41   AST U/L 98*   GLUCOSE RANDOM mg/dL 285*     Results from last 7 days   Lab Units 11/19/21  0652   INR  1 08     Results from last 7 days   Lab Units 11/21/21  1105 11/21/21  0733 11/20/21  2133 11/20/21  1634 11/20/21  1105 11/20/21  0746 11/20/21  0544 11/19/21  1648 11/19/21  1130   POC GLUCOSE mg/dl 354* 300* 312* 283* 195* 205* 200* 232* 132         Results from last 7 days   Lab Units 11/21/21  0452 11/20/21  0443 11/19/21  0846 11/19/21  0652   LACTIC ACID mmol/L  --   --  2 3* 2 3*   PROCALCITONIN ng/ml 21 52* 36 64*  --  24 66*           * I Have Reviewed All Lab Data Listed Above    * Additional Pertinent Lab Tests Reviewed: [address] Admission Reviewed    Imaging:    XR chest 1 view portable    Result Date: 11/19/2021  Impression: Moderate bilateral groundglass opacity due to Covid-19  Workstation performed: [id number]       Recent Cultures (last 7 days):     Results from last 7 days   Lab Units 11/19/21  0652 11/19/21  0650   BLOOD CULTURE  Staphylococcus aureus*  Staphylococcus epidermidis* No Growth at 48 hrs     GRAM STAIN RESULT  Gram positive cocci in clusters*  --        Last 24 Hours Medication List:   Current Facility-Administered Medications   Medication Dose Route Frequency Provider Last Rate    acetaminophen  650 mg Oral Q6H PRN Maria Victoria Holt, MD      amLODIPine  5 mg Oral Daily CIELO Moore      atorvastatin  40 mg Oral HS Yfn Irby MD      Baricitinib  1 mg Oral Q24H Maria Victoria Holt, MD      cefTRIAXone  1,000 mg Intravenous Q24H Maria Victoria Holt MD Stopped (11/21/21 0941)    dexamethasone  0 1 mg/kg Intravenous Q12H [address] CIELO Day Stopped (11/21/21 0857)    doxycycline hyclate  100 mg Oral Q12H Maria Victoria oHlt MD      guaiFENesin  600 mg Oral Q12H [address] Maria Victoria Holt MD      heparin (porcine)  5,000 Units Subcutaneous [facility] Maria Victoria Holt, MD      hydrocodone-chlorpheniramine polistirex  5 mL Oral Q12H PRN Morena Jimmy Irby MD      hydrOXYzine HCL  25 mg Oral Q6H PRN CIELO Be      insulin glargine  20 Units Subcutaneous HS Maria Victoria Holt MD      insulin lispro  1-5 Units Subcutaneous TID AC Yfn Irby MD      insulin lispro  5 Units Subcutaneous TID With Meals Maria Victoria Holt, MD      isosorbide mononitrate  60 mg Oral QAM CIELO Moore      loratadine  10 mg Oral Daily Maria Victoria Chaviraing, MD      melatonin  6 mg Oral HS CIELO Be      oxyCODONE  5 mg Oral Q4H PRN Maria Victoria Holt, MD      pantoprazole  40 mg Oral Early Morning Maria Victoria Holt, MD      phenol  1 spray Mouth/Throat Q2H PRN Maria Victoria Holt, MD Penny Dumont remdesivir  100 mg Intravenous Q24H Ishan Smart MD 0 mg (11/20/21 1014)    sodium bicarbonate  650 mg Oral BID after meals Mich Poke, CRNP      sodium chloride  1 spray Each Nare Q1H PRN Ishan Smart MD      tiZANidine  4 mg Oral Q8H PRN Ishan Smart MD          Today, Patient Was Seen By: Ishan Smart MD    ** Please Note: Dictation voice to text software may have been used in the creation of this document   **

## 2021-11-21 NOTE — ASSESSMENT & PLAN NOTE
75 year-old male, history of type 2 diabetes, CAD, hypertension presented with shortness of breath and cough  Patient found to be septic, tachypnea, leukocytosis  COVID positive 11/17, unvaccinated  Checks x-ray shows multifocal pneumonia  IV fluids, IV antibiotics Rocephin, procalcitonin significantly elevated, concern for superimposed bacterial infection  Remdesivir, IV steroids, baricitnib  Encourage self prone in, incentive spirometry, ambulation as able  Pulmonary following

## 2021-11-21 NOTE — ASSESSMENT & PLAN NOTE
Lab Results   Component Value Date    HGBA1C 8 0 (H) 11/04/2021     Improving appetite, hyperglycemia secondary to steroids  Will increase Lantus to 20 units, at 5 units with meals  Titrate insulin as needed, Accu-Cheks and sliding scale    (P) 164 [id number]

## 2021-11-21 NOTE — PLAN OF CARE
Problem: PAIN - ADULT  Goal: Verbalizes/displays adequate comfort level or baseline comfort level  Description: Interventions:  - Encourage patient to monitor pain and request assistance  - Assess pain using appropriate pain scale  - Administer analgesics based on type and severity of pain and evaluate response  - Implement non-pharmacological measures as appropriate and evaluate response  - Consider cultural and social influences on pain and pain management  - Notify physician/advanced practitioner if interventions unsuccessful or patient reports new pain  Outcome: Progressing     Problem: INFECTION - ADULT  Goal: Absence or prevention of progression during hospitalization  Description: INTERVENTIONS:  - Assess and monitor for signs and symptoms of infection  - Monitor lab/diagnostic results  - Monitor all insertion sites, i e  indwelling lines, tubes, and drains  - Monitor endotracheal if appropriate and nasal secretions for changes in amount and color  - [place] appropriate cooling/warming therapies per order  - Administer medications as ordered  - Instruct and encourage patient and family to use good hand hygiene technique  - Identify and instruct in appropriate isolation precautions for identified infection/condition  Outcome: Progressing     Problem: SAFETY ADULT  Goal: Patient will remain free of falls  Description: INTERVENTIONS:  - Educate patient/family on patient safety including physical limitations  - Instruct patient to call for assistance with activity   - Consult OT/PT to assist with strengthening/mobility   - Keep Call bell within reach  - Keep bed low and locked with side rails adjusted as appropriate  - Keep care items and personal belongings within reach  - Initiate and maintain comfort rounds  - Make Fall Risk Sign visible to staff  - Apply yellow socks and bracelet for high fall risk patients  - Consider moving patient to room near nurses station  Outcome: Progressing  Goal: Maintain or return to baseline ADL function  Description: INTERVENTIONS:  -  Assess patient's ability to carry out ADLs; assess patient's baseline for ADL function and identify physical deficits which impact ability to perform ADLs (bathing, care of mouth/teeth, toileting, grooming, dressing, etc )  - Assess/evaluate cause of self-care deficits   - Assess range of motion  - Assess patient's mobility; develop plan if impaired  - Assess patient's need for assistive devices and provide as appropriate  - Encourage maximum independence but intervene and supervise when necessary  - Involve family in performance of ADLs  - Assess for home care needs following discharge   - Consider OT consult to assist with ADL evaluation and planning for discharge  - Provide patient education as appropriate  Outcome: Progressing  Goal: Maintains/Returns to pre admission functional level  Description: INTERVENTIONS:  - Perform BMAT or MOVE assessment daily    - Set and communicate daily mobility goal to care team and patient/family/caregiver  - Collaborate with rehabilitation services on mobility goals if consulted  - Perform Range of Motion 3 times a day  - Reposition patient every 2 hours    - Dangle patient 3 times a day  - Stand patient 3 times a day  - Ambulate patient 3 times a day  - Out of bed to chair 3 times a day   - Out of bed for meals 3 times a day  - Out of bed for toileting  - Record patient progress and toleration of activity level   Outcome: Progressing     Problem: RESPIRATORY - ADULT  Goal: Achieves optimal ventilation and oxygenation  Description: INTERVENTIONS:  - Assess for changes in respiratory status  - Assess for changes in mentation and behavior  - Position to facilitate oxygenation and minimize respiratory effort  - Oxygen administered by appropriate delivery if ordered  - Initiate smoking cessation education as indicated  - Encourage broncho-pulmonary hygiene including cough, deep breathe, Incentive Spirometry  - Assess the need for suctioning and aspirate as needed  - Assess and instruct to report SOB or any respiratory difficulty  - Respiratory Therapy support as indicated  Outcome: Progressing     Problem: CARDIOVASCULAR - ADULT  Goal: Maintains optimal cardiac output and hemodynamic stability  Description: INTERVENTIONS:  - Monitor I/O, vital signs and rhythm  - Monitor for S/S and trends of decreased cardiac output  - Administer and titrate ordered vasoactive medications to optimize hemodynamic stability  - Assess quality of pulses, skin color and temperature  - Assess for signs of decreased coronary artery perfusion  - Instruct patient to report change in severity of symptoms  Outcome: Progressing     Problem: GENITOURINARY - ADULT  Goal: Maintains or returns to baseline urinary function  Description: INTERVENTIONS:  - Assess urinary function  - Encourage oral fluids to ensure adequate hydration if ordered  - Administer IV fluids as ordered to ensure adequate hydration  - Administer ordered medications as needed  - Offer frequent toileting  - Follow urinary retention protocol if ordered  Outcome: Progressing     Problem: METABOLIC, FLUID AND ELECTROLYTES - ADULT  Goal: Electrolytes maintained within normal limits  Description: INTERVENTIONS:  - Monitor labs and assess patient for signs and symptoms of electrolyte imbalances  - Administer electrolyte replacement as ordered  - Monitor response to electrolyte replacements, including repeat lab results as appropriate  - Instruct patient on fluid and nutrition as appropriate  Outcome: Progressing  Goal: Fluid balance maintained  Description: INTERVENTIONS:  - Monitor labs   - Monitor I/O and WT  - Instruct patient on fluid and nutrition as appropriate  - Assess for signs & symptoms of volume excess or deficit  Outcome: Progressing  Goal: Glucose maintained within target range  Description: INTERVENTIONS:  - Monitor Blood Glucose as ordered  - Assess for signs and symptoms of hyperglycemia and hypoglycemia  - Administer ordered medications to maintain glucose within target range  - Assess nutritional intake and initiate nutrition service referral as needed  Outcome: Progressing     Problem: MUSCULOSKELETAL - ADULT  Goal: Maintain or return mobility to safest level of function  Description: INTERVENTIONS:  - Assess patient's ability to carry out ADLs; assess patient's baseline for ADL function and identify physical deficits which impact ability to perform ADLs (bathing, care of mouth/teeth, toileting, grooming, dressing, etc )  - Assess/evaluate cause of self-care deficits   - Assess range of motion  - Assess patient's mobility  - Assess patient's need for assistive devices and provide as appropriate  - Encourage maximum independence but intervene and supervise when necessary  - Involve family in performance of ADLs  - Assess for home care needs following discharge   - Consider OT consult to assist with ADL evaluation and planning for discharge  - Provide patient education as appropriate  Outcome: Progressing     Problem: Potential for Falls  Goal: Patient will remain free of falls  Description: INTERVENTIONS:  - Educate patient/family on patient safety including physical limitations  - Instruct patient to call for assistance with activity   - Consult OT/PT to assist with strengthening/mobility   - Keep Call bell within reach  - Keep bed low and locked with side rails adjusted as appropriate  - Keep care items and personal belongings within reach  - Initiate and maintain comfort rounds  - Make Fall Risk Sign visible to staff  - Apply yellow socks and bracelet for high fall risk patients  - Consider moving patient to room near nurses station  Outcome: Progressing     Problem: Prexisting or High Potential for Compromised Skin Integrity  Goal: Skin integrity is maintained or improved  Description: INTERVENTIONS:  - Identify patients at risk for skin breakdown  - Assess and monitor skin integrity  - Assess and monitor nutrition and hydration status  - Monitor labs   - Assess for incontinence   - Turn and reposition patient  - Assist with mobility/ambulation  - Relieve pressure over bony prominences  - Avoid friction and shearing  - Provide appropriate hygiene as needed including keeping skin clean and dry  - Evaluate need for skin moisturizer/barrier cream  - Collaborate with interdisciplinary team   - Patient/family teaching  - Consider wound care consult   Outcome: Progressing     Problem: MOBILITY - ADULT  Goal: Maintain or return to baseline ADL function  Description: INTERVENTIONS:  -  Assess patient's ability to carry out ADLs; assess patient's baseline for ADL function and identify physical deficits which impact ability to perform ADLs (bathing, care of mouth/teeth, toileting, grooming, dressing, etc )  - Assess/evaluate cause of self-care deficits   - Assess range of motion  - Assess patient's mobility; develop plan if impaired  - Assess patient's need for assistive devices and provide as appropriate  - Encourage maximum independence but intervene and supervise when necessary  - Involve family in performance of ADLs  - Assess for home care needs following discharge   - Consider OT consult to assist with ADL evaluation and planning for discharge  - Provide patient education as appropriate  Outcome: Progressing  Goal: Maintains/Returns to pre admission functional level  Description: INTERVENTIONS:  - Perform BMAT or MOVE assessment daily    - Set and communicate daily mobility goal to care team and patient/family/caregiver  - Collaborate with rehabilitation services on mobility goals if consulted  - Perform Range of Motion 3 times a day  - Reposition patient every 2 hours    - Dangle patient 3 times a day  - Stand patient 3 times a day  - Ambulate patient 3 times a day  - Out of bed to chair 3 times a day   - Out of bed for meals 3 times a day  - Out of bed for toileting  - Record patient progress and toleration of activity level   Outcome: Progressing

## 2021-11-21 NOTE — ASSESSMENT & PLAN NOTE
Previous lab shows patient's baseline creatinine roughly around 1 8-2  Likely prerenal in setting of COVID, poor p o   Intake, mildly elevated CK  Nephrology consulted, appreciate recommendations  Slight improvement today in renal functions

## 2021-11-21 NOTE — PLAN OF CARE
Problem: PAIN - ADULT  Goal: Verbalizes/displays adequate comfort level or baseline comfort level  Description: Interventions:  - Encourage patient to monitor pain and request assistance  - Assess pain using appropriate pain scale  - Administer analgesics based on type and severity of pain and evaluate response  - Implement non-pharmacological measures as appropriate and evaluate response  - Consider cultural and social influences on pain and pain management  - Notify physician/advanced practitioner if interventions unsuccessful or patient reports new pain  Outcome: Progressing     Problem: INFECTION - ADULT  Goal: Absence or prevention of progression during hospitalization  Description: INTERVENTIONS:  - Assess and monitor for signs and symptoms of infection  - Monitor lab/diagnostic results  - Monitor all insertion sites, i e  indwelling lines, tubes, and drains  - Monitor endotracheal if appropriate and nasal secretions for changes in amount and color  - [place] appropriate cooling/warming therapies per order  - Administer medications as ordered  - Instruct and encourage patient and family to use good hand hygiene technique  - Identify and instruct in appropriate isolation precautions for identified infection/condition  Outcome: Progressing     Problem: SAFETY ADULT  Goal: Patient will remain free of falls  Description: INTERVENTIONS:  - Educate patient/family on patient safety including physical limitations  - Instruct patient to call for assistance with activity   - Consult OT/PT to assist with strengthening/mobility   - Keep Call bell within reach  - Keep bed low and locked with side rails adjusted as appropriate  - Keep care items and personal belongings within reach  - Initiate and maintain comfort rounds  - Make Fall Risk Sign visible to staff  - Offer Toileting every **2* Hours, in advance of need  - Initiate/Maintain **bed alarm  - Apply yellow socks and bracelet for high fall risk patients  - Consider moving patient to room near nurses station  Outcome: Progressing  Goal: Maintain or return to baseline ADL function  Description: INTERVENTIONS:  -  Assess patient's ability to carry out ADLs; assess patient's baseline for ADL function and identify physical deficits which impact ability to perform ADLs (bathing, care of mouth/teeth, toileting, grooming, dressing, etc )  - Assess/evaluate cause of self-care deficits   - Assess range of motion  - Assess patient's mobility; develop plan if impaired  - Assess patient's need for assistive devices and provide as appropriate  - Encourage maximum independence but intervene and supervise when necessary  - Involve family in performance of ADLs  - Assess for home care needs following discharge   - Consider OT consult to assist with ADL evaluation and planning for discharge  - Provide patient education as appropriate  Outcome: Progressing  Goal: Maintains/Returns to pre admission functional level  Description: INTERVENTIONS:  - Perform BMAT or MOVE assessment daily    - Set and communicate daily mobility goal to care team and patient/family/caregiver  - Collaborate with rehabilitation services on mobility goals if consulted  - Perform Range of Motion *3** times a day  - Reposition patient every *2** hours    - Dangle patient **3* times a day  - Stand patient *3** times a day  - Ambulate patient *3** times a day  - Out of bed to chair *3** times a day   - Out of bed for meals **3* times a day  - Out of bed for toileting  - Record patient progress and toleration of activity level   Outcome: Progressing     Problem: RESPIRATORY - ADULT  Goal: Achieves optimal ventilation and oxygenation  Description: INTERVENTIONS:  - Assess for changes in respiratory status  - Assess for changes in mentation and behavior  - Position to facilitate oxygenation and minimize respiratory effort  - Oxygen administered by appropriate delivery if ordered  - Initiate smoking cessation education as indicated  - Encourage broncho-pulmonary hygiene including cough, deep breathe, Incentive Spirometry  - Assess the need for suctioning and aspirate as needed  - Assess and instruct to report SOB or any respiratory difficulty  - Respiratory Therapy support as indicated  Outcome: Progressing     Problem: Potential for Falls  Goal: Patient will remain free of falls  Description: INTERVENTIONS:  - Educate patient/family on patient safety including physical limitations  - Instruct patient to call for assistance with activity   - Consult OT/PT to assist with strengthening/mobility   - Keep Call bell within reach  - Keep bed low and locked with side rails adjusted as appropriate  - Keep care items and personal belongings within reach  - Initiate and maintain comfort rounds  - Make Fall Risk Sign visible to staff  - Offer Toileting every **2* Hours, in advance of need  - Initiate/Maintain **bed*alarm  - Apply yellow socks and bracelet for high fall risk patients  - Consider moving patient to room near nurses station  Outcome: Progressing     Problem: CARDIOVASCULAR - ADULT  Goal: Maintains optimal cardiac output and hemodynamic stability  Description: INTERVENTIONS:  - Monitor I/O, vital signs and rhythm  - Monitor for S/S and trends of decreased cardiac output  - Administer and titrate ordered vasoactive medications to optimize hemodynamic stability  - Assess quality of pulses, skin color and temperature  - Assess for signs of decreased coronary artery perfusion  - Instruct patient to report change in severity of symptoms  Outcome: Progressing     Problem: GENITOURINARY - ADULT  Goal: Maintains or returns to baseline urinary function  Description: INTERVENTIONS:  - Assess urinary function  - Encourage oral fluids to ensure adequate hydration if ordered  - Administer IV fluids as ordered to ensure adequate hydration  - Administer ordered medications as needed  - Offer frequent toileting  - Follow urinary retention protocol if ordered  Outcome: Progressing     Problem: METABOLIC, FLUID AND ELECTROLYTES - ADULT  Goal: Electrolytes maintained within normal limits  Description: INTERVENTIONS:  - Monitor labs and assess patient for signs and symptoms of electrolyte imbalances  - Administer electrolyte replacement as ordered  - Monitor response to electrolyte replacements, including repeat lab results as appropriate  - Instruct patient on fluid and nutrition as appropriate  Outcome: Progressing  Goal: Fluid balance maintained  Description: INTERVENTIONS:  - Monitor labs   - Monitor I/O and WT  - Instruct patient on fluid and nutrition as appropriate  - Assess for signs & symptoms of volume excess or deficit  Outcome: Progressing  Goal: Glucose maintained within target range  Description: INTERVENTIONS:  - Monitor Blood Glucose as ordered  - Assess for signs and symptoms of hyperglycemia and hypoglycemia  - Administer ordered medications to maintain glucose within target range  - Assess nutritional intake and initiate nutrition service referral as needed  Outcome: Progressing     Problem: MUSCULOSKELETAL - ADULT  Goal: Maintain or return mobility to safest level of function  Description: INTERVENTIONS:  - Assess patient's ability to carry out ADLs; assess patient's baseline for ADL function and identify physical deficits which impact ability to perform ADLs (bathing, care of mouth/teeth, toileting, grooming, dressing, etc )  - Assess/evaluate cause of self-care deficits   - Assess range of motion  - Assess patient's mobility  - Assess patient's need for assistive devices and provide as appropriate  - Encourage maximum independence but intervene and supervise when necessary  - Involve family in performance of ADLs  - Assess for home care needs following discharge   - Consider OT consult to assist with ADL evaluation and planning for discharge  - Provide patient education as appropriate  Outcome: Progressing

## 2021-11-21 NOTE — PROGRESS NOTES
Progress Note - Pulmonary   Shasta Parker 77 y o  male MRN: [id number]  Unit/Bed#: [place] [address] -01 Encounter: [id number]      Assessment/Plan:         1  Acute hypoxic respiratory failure secondary to COVID 19  1  Titrate oxygen as needed to maintain SpO2 >/=88%  2  Pulmonary toilet: IS, cough deep breathe  3  Side lying and prone position  4  Current O2 needs 15L midflow and NRB  2  COVID 19 moderate protocol started  1  Recommendations  1  Follow echo  2  Labs  1   6--215 3--195 4  2  Procalcitonin 24 66-36 64  3  BNP 3590  4  Troponin  WNL   5  HIV NA  6  hepatitis panel NA   3  Medications  1  lipitor  2  Anticoagulation : DVT prophylaxis  3  Remdesivir day 3/5  4  decadron dosing 0 1mg/kg BID day 3/10  5  Diuretics held  6  Actemra  Baricitinib day 2/14  7  Convalsecent plasma NA  8  Antibiotics: continue rocephin and doxycycline day 3  3  Hyperkalemia  1  K remains elevated to 5 4  2  S/p insuline and dextrose  3  Management per IM  4  Hyponatremia  1  improving  5  HUBER on CKD 3  1  Baseline creatinine 1 8--2 1  2  Creatinine during admission: 2 13--2 71-2 40  3  suspected ATN secondary to COVID 19  4  Nephrology following  5  Follow urinalysis possible renal US  6  Acute CHF  1  Follow echocardiogram  2  Diuretics held due to HUBER  3  Consider cardiology evaluation          Subjective:   Resting comfortably upon entering the room  No change in symptoms  + dry cough, chest discomfort and dyspnea    Objective:         Vitals: Blood pressure 137/53, pulse 84, temperature 98 4 °F (36 9 °C), temperature source Axillary, resp  rate 21, height 5' 4" (1 626 m), weight 110 kg (242 lb), SpO2 (!) 86 %  , 15L midflow and 15 L NRB, Body mass index is 41 54 kg/m²        Intake/Output Summary (Last 24 hours) at 11/21/2021 1227  Last data filed at 11/21/2021 0946  Gross per 24 hour   Intake --   Output 450 ml   Net -450 ml         Physical Exam  Gen: Awake, alert, oriented x 3, no acute distress  HEENT: Mucous membranes moist, no oral lesions, no thrush  NECK: no accessory muscle use, JVP not elevated  Cardiac: Regular, single S1, single S2, no murmurs, no rubs, no gallops  Lungs: decreased bilaterally  Abdomen: normoactive bowel sounds, soft nontender, nondistended, no rebound or rigidity, no guarding  Extremities: no cyanosis, no clubbing, no edema    Labs: I have personally reviewed pertinent lab results  , CBC:   Lab Results   Component Value Date    WBC 14 47 (H) 11/21/2021    HGB 12 6 11/21/2021    HCT 39 4 11/21/2021    MCV 76 (L) 11/21/2021     (L) 11/21/2021    MCH 24 4 (L) 11/21/2021    MCHC 32 0 11/21/2021    RDW 16 0 (H) 11/21/2021    MPV 10 8 11/21/2021    NRBC 0 11/21/2021   , CMP:   Lab Results   Component Value Date    SODIUM 132 (L) 11/21/2021    K 5 4 (H) 11/21/2021     11/21/2021    CO2 21 11/21/2021    BUN 55 (H) 11/21/2021    CREATININE 2 46 (H) 11/21/2021    CALCIUM 8 3 11/21/2021    AST 98 (H) 11/21/2021    ALT 41 11/21/2021    ALKPHOS 151 (H) 11/21/2021    EGFR 26 11/21/2021     Imaging and other studies: none      Ying Sawant

## 2021-11-22 PROBLEM — E87.5 HYPERKALEMIA: Status: RESOLVED | Noted: 2021-01-01 | Resolved: 2021-01-01

## 2021-11-22 NOTE — PLAN OF CARE
Problem: PAIN - ADULT  Goal: Verbalizes/displays adequate comfort level or baseline comfort level  Description: Interventions:  - Encourage patient to monitor pain and request assistance  - Assess pain using appropriate pain scale  - Administer analgesics based on type and severity of pain and evaluate response  - Implement non-pharmacological measures as appropriate and evaluate response  - Consider cultural and social influences on pain and pain management  - Notify physician/advanced practitioner if interventions unsuccessful or patient reports new pain  Outcome: Progressing     Problem: INFECTION - ADULT  Goal: Absence or prevention of progression during hospitalization  Description: INTERVENTIONS:  - Assess and monitor for signs and symptoms of infection  - Monitor lab/diagnostic results  - Monitor all insertion sites, i e  indwelling lines, tubes, and drains  - Monitor endotracheal if appropriate and nasal secretions for changes in amount and color  - Bassett appropriate cooling/warming therapies per order  - Administer medications as ordered  - Instruct and encourage patient and family to use good hand hygiene technique  - Identify and instruct in appropriate isolation precautions for identified infection/condition  Outcome: Progressing     Problem: SAFETY ADULT  Goal: Patient will remain free of falls  Description: INTERVENTIONS:  - Educate patient/family on patient safety including physical limitations  - Instruct patient to call for assistance with activity   - Consult OT/PT to assist with strengthening/mobility   - Keep Call bell within reach  - Keep bed low and locked with side rails adjusted as appropriate  - Keep care items and personal belongings within reach  - Initiate and maintain comfort rounds  - Make Fall Risk Sign visible to staff  - Apply yellow socks and bracelet for high fall risk patients  - Consider moving patient to room near nurses station  Outcome: Progressing  Goal: Maintain or return to baseline ADL function  Description: INTERVENTIONS:  -  Assess patient's ability to carry out ADLs; assess patient's baseline for ADL function and identify physical deficits which impact ability to perform ADLs (bathing, care of mouth/teeth, toileting, grooming, dressing, etc )  - Assess/evaluate cause of self-care deficits   - Assess range of motion  - Assess patient's mobility; develop plan if impaired  - Assess patient's need for assistive devices and provide as appropriate  - Encourage maximum independence but intervene and supervise when necessary  - Involve family in performance of ADLs  - Assess for home care needs following discharge   - Consider OT consult to assist with ADL evaluation and planning for discharge  - Provide patient education as appropriate  Outcome: Progressing  Goal: Maintains/Returns to pre admission functional level  Description: INTERVENTIONS:  - Perform BMAT or MOVE assessment daily    - Set and communicate daily mobility goal to care team and patient/family/caregiver  - Collaborate with rehabilitation services on mobility goals if consulted  - Perform Range of Motion 3 times a day  - Reposition patient every 2 hours    - Dangle patient 3 times a day  - Stand patient 3 times a day  - Ambulate patient 3 times a day  - Out of bed to chair 3 times a day   - Out of bed for meals 3 times a day  - Out of bed for toileting  - Record patient progress and toleration of activity level   Outcome: Progressing     Problem: RESPIRATORY - ADULT  Goal: Achieves optimal ventilation and oxygenation  Description: INTERVENTIONS:  - Assess for changes in respiratory status  - Assess for changes in mentation and behavior  - Position to facilitate oxygenation and minimize respiratory effort  - Oxygen administered by appropriate delivery if ordered  - Initiate smoking cessation education as indicated  - Encourage broncho-pulmonary hygiene including cough, deep breathe, Incentive Spirometry  - Assess the need for suctioning and aspirate as needed  - Assess and instruct to report SOB or any respiratory difficulty  - Respiratory Therapy support as indicated  Outcome: Progressing     Problem: CARDIOVASCULAR - ADULT  Goal: Maintains optimal cardiac output and hemodynamic stability  Description: INTERVENTIONS:  - Monitor I/O, vital signs and rhythm  - Monitor for S/S and trends of decreased cardiac output  - Administer and titrate ordered vasoactive medications to optimize hemodynamic stability  - Assess quality of pulses, skin color and temperature  - Assess for signs of decreased coronary artery perfusion  - Instruct patient to report change in severity of symptoms  Outcome: Progressing     Problem: GENITOURINARY - ADULT  Goal: Maintains or returns to baseline urinary function  Description: INTERVENTIONS:  - Assess urinary function  - Encourage oral fluids to ensure adequate hydration if ordered  - Administer IV fluids as ordered to ensure adequate hydration  - Administer ordered medications as needed  - Offer frequent toileting  - Follow urinary retention protocol if ordered  Outcome: Progressing     Problem: METABOLIC, FLUID AND ELECTROLYTES - ADULT  Goal: Electrolytes maintained within normal limits  Description: INTERVENTIONS:  - Monitor labs and assess patient for signs and symptoms of electrolyte imbalances  - Administer electrolyte replacement as ordered  - Monitor response to electrolyte replacements, including repeat lab results as appropriate  - Instruct patient on fluid and nutrition as appropriate  Outcome: Progressing  Goal: Fluid balance maintained  Description: INTERVENTIONS:  - Monitor labs   - Monitor I/O and WT  - Instruct patient on fluid and nutrition as appropriate  - Assess for signs & symptoms of volume excess or deficit  Outcome: Progressing  Goal: Glucose maintained within target range  Description: INTERVENTIONS:  - Monitor Blood Glucose as ordered  - Assess for signs and symptoms of hyperglycemia and hypoglycemia  - Administer ordered medications to maintain glucose within target range  - Assess nutritional intake and initiate nutrition service referral as needed  Outcome: Progressing     Problem: MUSCULOSKELETAL - ADULT  Goal: Maintain or return mobility to safest level of function  Description: INTERVENTIONS:  - Assess patient's ability to carry out ADLs; assess patient's baseline for ADL function and identify physical deficits which impact ability to perform ADLs (bathing, care of mouth/teeth, toileting, grooming, dressing, etc )  - Assess/evaluate cause of self-care deficits   - Assess range of motion  - Assess patient's mobility  - Assess patient's need for assistive devices and provide as appropriate  - Encourage maximum independence but intervene and supervise when necessary  - Involve family in performance of ADLs  - Assess for home care needs following discharge   - Consider OT consult to assist with ADL evaluation and planning for discharge  - Provide patient education as appropriate  Outcome: Progressing     Problem: Potential for Falls  Goal: Patient will remain free of falls  Description: INTERVENTIONS:  - Educate patient/family on patient safety including physical limitations  - Instruct patient to call for assistance with activity   - Consult OT/PT to assist with strengthening/mobility   - Keep Call bell within reach  - Keep bed low and locked with side rails adjusted as appropriate  - Keep care items and personal belongings within reach  - Initiate and maintain comfort rounds  - Make Fall Risk Sign visible to staff  - Apply yellow socks and bracelet for high fall risk patients  - Consider moving patient to room near nurses station  Outcome: Progressing     Problem: Prexisting or High Potential for Compromised Skin Integrity  Goal: Skin integrity is maintained or improved  Description: INTERVENTIONS:  - Identify patients at risk for skin breakdown  - Assess and monitor skin integrity  - Assess and monitor nutrition and hydration status  - Monitor labs   - Assess for incontinence   - Turn and reposition patient  - Assist with mobility/ambulation  - Relieve pressure over bony prominences  - Avoid friction and shearing  - Provide appropriate hygiene as needed including keeping skin clean and dry  - Evaluate need for skin moisturizer/barrier cream  - Collaborate with interdisciplinary team   - Patient/family teaching  - Consider wound care consult   Outcome: Progressing     Problem: MOBILITY - ADULT  Goal: Maintain or return to baseline ADL function  Description: INTERVENTIONS:  -  Assess patient's ability to carry out ADLs; assess patient's baseline for ADL function and identify physical deficits which impact ability to perform ADLs (bathing, care of mouth/teeth, toileting, grooming, dressing, etc )  - Assess/evaluate cause of self-care deficits   - Assess range of motion  - Assess patient's mobility; develop plan if impaired  - Assess patient's need for assistive devices and provide as appropriate  - Encourage maximum independence but intervene and supervise when necessary  - Involve family in performance of ADLs  - Assess for home care needs following discharge   - Consider OT consult to assist with ADL evaluation and planning for discharge  - Provide patient education as appropriate  Outcome: Progressing  Goal: Maintains/Returns to pre admission functional level  Description: INTERVENTIONS:  - Perform BMAT or MOVE assessment daily    - Set and communicate daily mobility goal to care team and patient/family/caregiver  - Collaborate with rehabilitation services on mobility goals if consulted  - Perform Range of Motion 3 times a day  - Reposition patient every 2 hours    - Dangle patient 3 times a day  - Stand patient 3 times a day  - Ambulate patient 3 times a day  - Out of bed to chair 3 times a day   - Out of bed for meals 3 times a day  - Out of bed for toileting  - Record patient progress and toleration of activity level   Outcome: Progressing

## 2021-11-22 NOTE — ASSESSMENT & PLAN NOTE
Lab Results   Component Value Date    HGBA1C 8 0 (H) 11/04/2021     Improving appetite, hyperglycemia secondary to steroids  Will increase Lantus to 30 units, at 10 units with meals  Titrate insulin as needed, Accu-Cheks and sliding scale    (P) 268 4

## 2021-11-22 NOTE — CASE MANAGEMENT
Case Management Assessment & Discharge Planning Note    Patient name Nevin Beck  Location [facility] 68 2 /[facility] 2 Gracie Hlil* MRN [id number]  : 1955 Date 2021       Current Admission Date: 2021  Current Admission Diagnosis:Acute hypoxemic respiratory failure due to [address])   Patient Active Problem List    Diagnosis Date Noted    Gram-positive bacteremia 2021    Hx of CABG 2021    Essential hypertension 2021    CKD (chronic kidney disease) 2021    HUBER (acute kidney injury) ([facility] ) 2021    Acute hypoxemic respiratory failure due to [address]) 2021    Sepsis due to [address]) 2021    Type 2 diabetes mellitus, without long-term current use of insulin ([facility] ) 2020    Class 2 severe obesity due to excess calories with serious comorbidity and body mass index (BMI) of 35 0 to 35 9 in adult [facility]) 2020    Hyperparathyroidism due to renal insufficiency ([facility] ) 2020    Atherosclerosis of native coronary artery with angina pectoris ([facility] ) 2020    Bronchitis 2019    Vertigo 2019    Upper respiratory tract infection 2019    Peripheral vascular disease, unspecified ([facility] ) 2019    Microcytic anemia 2018    [facility]      LOS (days): 3  Geometric Mean LOS (GMLOS) (days): 5 40  Days to GMLOS:2 1     OBJECTIVE:    Risk of Unplanned Readmission Score: 17         Current admission status: Inpatient       Preferred Pharmacy:   [address] [place] [address] [address] [id number]  Phone: [phone] Fax: [phone]    Primary Care Provider: Nasreen Back MD    Primary Insurance: MEDICARE  Secondary Insurance: [address]:  [facility] Phone: [phone] (Mobile)               Advance Directives  Does patient have a Health Care POA?: No  Was patient offered paperwork?: Yes (declined at this time)  Does patient currently have a Health Care decision maker?: Yes, please see Health Care Proxy section  Does patient have Advance Directives?: No  Was patient offered paperwork?: Yes (declined at this time)  Primary Contact: Deniece Holter (son) [phone] who will speak with pt's Wife Bianka Cummings (doesn't speak english)         Readmission Root Cause  30 Day Readmission: No    Patient Information  Admitted from[de-identified] Home  Mental Status: Alert  During Assessment patient was accompanied by: Not accompanied during assessment  Assessment information provided by[de-identified] Patient,Son  Primary Caregiver: Self (spouse assists as needed with ADL's)  Support Systems: Spouse/significant other,Son  [place] of Residence: [facility] entry access options   Select all that apply : Stairs  Number of steps to enter home : 5 (1 curb + 3 +1)  Do the steps have railings?: Yes ((L) HR to enter)  Type of Current Residence: [address] home  Upon entering residence, is there a bedroom on the main floor (no further steps)?: No  A bedroom is located on the following floor levels of residence (select all that apply):: 2nd Floor  Upon entering residence, is there a bathroom on the main floor (no further steps)?: No  Indicate which floors of current residence have a bathroom (select all the apply):: 2nd Floor  Number of steps to 2nd floor from main floor: One Flight  In the last 12 months, was there a time when you were not able to pay the mortgage or rent on time?: No  Living Arrangements: Lives w/ Spouse/significant other  Is patient a ?: No    Activities of Daily Living Prior to Admission  Functional Status: Independent (required CGA with ADL's provided by wife)  Completes ADLs independently?: No  Level of ADL dependence: Assistance (required CGA with ADL's provided by wife)  Ambulates independently?: Yes  Does patient use assisted devices?: No  Does patient currently own DME?: Yes  What DME does the patient currently own?: Shower Chair  Does patient have a history of Outpatient Therapy (PT/OT)?: No  Does patient have a history of HHC?: No  Does patient currently have [facility]?: No         Patient Information Continued  Income Source: SSI/SSD  Does patient have prescription coverage?: Yes  Within the past 12 months, you worried that your food would run out before you got the money to buy more : Never true  Within the past 12 months, the food you bought just didnt last and you didnt have money to get more : Never true  Does patient have a history of substance abuse?: No  Does patient have a history of Mental Health Diagnosis?: No    Pt drives self to appointments, grocery shopping, obtaining his medications from [facility]-Pt obtains assistance prn from wife for ADL's however performs for most part himself, he is able to ambulate without an AD navigating the steps with minimal difficulty as of late due to SOB and fatigue  DISCHARGE DETAILS: CM spoke to pt who requests this writer speak to his son due to fatigue and SOB  Discussed therapy recommendations with son reviewing limited facilities however those available (both acute and subacute)- Due to pt's current status also discussed possibility of LTACH- Holley Rothman would like to discuss the latter with his father and mother making the decision thereafter  No further questions/concerns voiced at present  Will continue to follow to assist with dc poc      Discharge planning discussed with[de-identified] pt and pt's son        CM contacted family/caregiver?: Yes  Were Treatment Team discharge recommendations reviewed with patient/caregiver?: Yes  Did patient/caregiver verbalize understanding of patient care needs?: Yes       Contacts  Patient Contacts: Aubrey Knight  Relationship to Patient[de-identified] Family  Contact Method: Phone  Reason/Outcome: Discharge Planning      Would you like to participate in our [address] service program?  : No - Declined

## 2021-11-22 NOTE — PROGRESS NOTES
Progress Note - Nephrology   Tyron Good 77 y o  male MRN: [id number]  Unit/Bed#: [facility] [address] [id number] Encounter: [id number]    ASSESSMENT and PLAN:  Acute kidney injury:  The setting of Chronic Kidney DiseaseIIIB  Etiology: Suspect ATN in the setting of COVID-19 infection as well as fluid overload component status post diuretics  Assessment:   After review of medical records through [address] [place] [address] it appears that the patient has a baseline Creatinine of 1 8-2 1   Patient follows Yenny Ngo at [facility] nephrology   Patient was admitted with a creatinine of 2 13 on 11/19/2020   Peak creatinine 2 71 11/20/2021   Patient's creatinine today is at 2 48-appears to plateau   Acid base and lytes stable    Clinically, patient is not uremic and there is no acute indication for renal replacement therapy (dialysis)  Workup:   Urinalysis with micro reports:  Trace ketones trace protein, 2-4 RBCs, 1-2 WBCs 0-1 fine granular cast   CT scan of abdomen and pelvis reports:  Plan:   Avoid nephrotoxins, adjust meds to appropriate GFR   Optimize hemodynamic status to avoid delay in renal recovery   Renal function worsens consider check and renal ultrasound   Use diuretics as needed   Will continue trend I/O, lab values volume status   Avoid NSAIDs limit IV contrast   Continue with strict I&O   Will need follow-up with his primary nephrologist on discharge    Blood pressure/Hypertension:  Assessment and Plan:   Current blood pressure overall okay-avoid fluctuations   Current medications include:  Amlodipine 5 mg daily, Imdur 60 mg daily   Hold parameters for SBP less than 130 on amlodipine   Maximize hemodynamics to maintain MAP >65   Avoid hypotension or fluctuations in blood pressure   Will continue to trend    Acid-base:  Metabolic acidosis  Assessment and Plan:   Current bicarb 21   Currently on sodium bicarbonate tablet 60 mg 2 times daily   Will continue to trend     Electrolytes:  Assessment and Plan:   Pseudo Hyponatremia: In the setting of hyperglycemia   On Decadron per primary team   Current sodium 139  Hyperkalemia:   Improving and currently 4 8   Status post IV Lasix 11/19/2021   On low-potassium diet    Other medical Issues:  COVID-19 infection:  Management Per primary team  Acute on chronic congestive heart failure:  Status with IV Lasix-currently on hold  · For primary team  · Echocardiogram obtained on 11/21/2021 reveals EF of 67%, approaching severe concentric left ventricular hypertrophy  IVC not well visualized          SUBJECTIVE:  Patient seen and examined at bedside in room  Patient breathing okay with non-rebreather on  Concerned about his kidneys  Denies chest pain or worsening shortness of breath    Denies headache or dizziness nausea vomiting    OBJECTIVE:  Current Weight: Weight - Scale: 104 kg (229 lb 15 oz)  Vitals:    11/22/21 0353 11/22/21 0556 11/22/21 0743 11/22/21 1115   BP: 162/74  96/63 156/60   BP Location:       Pulse: 82  83 81   Resp:   20 16   Temp:   97 6 °F (36 4 °C) 97 6 °F (36 4 °C)   TempSrc:       SpO2: 96%  98% 98%   Weight:  104 kg (229 lb 15 oz)     Height:         No intake or output data in the 24 hours ending 11/22/21 1128  General:  No acute distress, cooperative, lying on right side five  Skin:  Warm and dry without rash  ENMT:  Mucous membranes moist, sclera anicteric  Neck:  Supple without JVD noted  Respiratory:  Essentially clear on auscultation without crackles, rhonchi, wheezes, on non-rebreather, decreased bases  Cardiac:  Regular rate and rhythm without rub, or murmur  GI:  Soft, nontender, no distention, active bowel sounds  Extremities:  No significant edema noted bilaterally  Neuro:  Alert oriented and awake  Psych:  Appropriate affect    Medications:    Current Facility-Administered Medications:     acetaminophen (TYLENOL) tablet 650 mg, 650 mg, Oral, Q6H PRN, Maria Victoria Holt MD, 650 mg at 11/22/21 0842    amLODIPine (NORVASC) tablet 5 mg, 5 mg, Oral, Daily, CIELO Larsen, 5 mg at 11/21/21 0827    atorvastatin (LIPITOR) tablet 40 mg, 40 mg, Oral, HS, Samantha Garnett MD, 40 mg at 11/21/21 2222    Baricitinib (OLUMIANT) (COVID EUA) partial tablet 1 mg, 1 mg, Oral, Q24H, Samantha Garnett MD, 1 mg at 11/21/21 1450    ceftriaxone (ROCEPHIN) 1 g/50 mL in dextrose IVPB, 1,000 mg, Intravenous, Q24H, Samantha Garnett MD, Last Rate: 100 mL/hr at 11/22/21 0915, 1,000 mg at 11/22/21 0915    dexamethasone (DECADRON) 11 1 mg in sodium chloride 0 9 % 50 mL IVPB, 0 1 mg/kg, Intravenous, Q12H [address], CIELO Rosa, Last Rate: 100 mL/hr at 11/22/21 0832, 11 1 mg at 11/22/21 0624    doxycycline hyclate (VIBRAMYCIN) capsule 100 mg, 100 mg, Oral, Q12H, Samantha Garnett MD, 100 mg at 11/22/21 0832    guaiFENesin (MUCINEX) 12 hr tablet 600 mg, 600 mg, Oral, Q12H [address], Samantha Garnett MD, 600 mg at 11/22/21 2874    heparin (porcine) subcutaneous injection 5,000 Units, 5,000 Units, Subcutaneous, Q8H [address], Samantha Garnett MD, 5,000 Units at 11/22/21 0534    hydrocodone-chlorpheniramine polistirex (TUSSIONEX) ER suspension 5 mL, 5 mL, Oral, Q12H PRN, Samantha Garnett MD, 5 mL at 11/21/21 2104    hydrOXYzine HCL (ATARAX) tablet 25 mg, 25 mg, Oral, Q6H PRN, CIELO Osuna, 25 mg at 11/22/21 1126    insulin glargine (LANTUS) subcutaneous injection 10 Units 0 1 mL, 10 Units, Subcutaneous, Once, Samantha Garnett MD    insulin glargine (LANTUS) subcutaneous injection 30 Units 0 3 mL, 30 Units, Subcutaneous, HS, Yfn Guadalupe MD    insulin lispro (HumaLOG) 100 units/mL subcutaneous injection 1-5 Units, 1-5 Units, Subcutaneous, TID AC, 3 Units at 11/22/21 1123 **AND** Fingerstick Glucose (POCT), , , TID AC, Yfn Guadalupe MD    insulin lispro (HumaLOG) 100 units/mL subcutaneous injection 10 Units, 10 Units, Subcutaneous, TID With Meals, Samantha Garnett MD, 10 Units at 11/22/21 1123    isosorbide mononitrate (IMDUR) 24 hr tablet 60 mg, 60 mg, Oral, QAM, CIELO Baldwin, 60 mg at 11/22/21 4061    loratadine (CLARITIN) tablet 10 mg, 10 mg, Oral, Daily, Rickie Padilla MD, 10 mg at 11/22/21 8671    melatonin tablet 6 mg, 6 mg, Oral, HS, CIELO Osuna, 6 mg at 11/21/21 2103    oxyCODONE (ROXICODONE) IR tablet 5 mg, 5 mg, Oral, Q4H PRN, Rickie Padilla MD, 5 mg at 11/22/21 1126    pantoprazole (PROTONIX) EC tablet 40 mg, 40 mg, Oral, Early Morning, Rickie Padilla MD, 40 mg at 11/22/21 0534    phenol (CHLORASEPTIC) 1 4 % mucosal liquid 1 spray, 1 spray, Mouth/Throat, Q2H PRN, Rickie Padilla MD, 1 spray at 11/20/21 1716    [COMPLETED] remdesivir (Veklury) 200 mg in sodium chloride 0 9 % 290 mL IVPB, 200 mg, Intravenous, Q24H, Stopped at 11/19/21 1509 **FOLLOWED BY** remdesivir (Veklury) 100 mg in sodium chloride 0 9 % 270 mL IVPB, 100 mg, Intravenous, Q24H, Rickie Padilla MD, Last Rate: 0 mL/hr at 11/20/21 1014, 100 mg at 11/22/21 1021    sodium bicarbonate tablet 650 mg, 650 mg, Oral, BID after meals, CIELO Baldwin, 650 mg at 11/22/21 0738    sodium chloride (OCEAN) 0 65 % nasal spray 1 spray, 1 spray, Each Nare, Q1H PRN, Rickie Padilla MD    tiZANidine (ZANAFLEX) tablet 4 mg, 4 mg, Oral, Q8H PRN, Rickie Padilla MD, 4 mg at 11/19/21 1833    Laboratory Results:  Results from last 7 days   Lab Units 11/22/21  0548 11/21/21  0452 11/20/21  0443 11/19/21  0652   WBC Thousand/uL 17 07* 14 47* 17 84* 19 70*   HEMOGLOBIN g/dL 12 8 12 6 12 6 13 0   HEMATOCRIT % 40 5 39 4 39 7 41 6   PLATELETS Thousands/uL 152 125* 113* 106*   SODIUM mmol/L 136 132* 130* 129*   POTASSIUM mmol/L 4 8 5 4* 5 5* 5 4*   CHLORIDE mmol/L 101 100 99* 98*   CO2 mmol/L 21 21 20* 22   BUN mg/dL 62* 55* 40* 26*   CREATININE mg/dL 2 48* 2 46* 2 71* 2 13*   CALCIUM mg/dL 8 7 8 3 8 1* 8 2*   MAGNESIUM mg/dL  --   --   --  1 6

## 2021-11-22 NOTE — PROGRESS NOTES
[address] [place] [address]  Progress Note - Williams Ayala 1955, 77 y o  male MRN: [id number]  Unit/Bed#: [facility] [address] 216-01 Encounter: [id number]  Primary Care Provider: More Conklin MD   Date and time admitted to hospital: 11/19/2021  6:27 AM    * Acute hypoxemic respiratory failure due to COVID-19 [facility])  Assessment & Plan  75-year-old male, history of type 2 diabetes, CAD, hypertension presented with shortness of breath and cough  Patient found to be septic, tachypnea, leukocytosis  COVID positive 11/17, unvaccinated  Checks x-ray shows multifocal pneumonia  IV fluids, IV antibiotics Rocephin, procalcitonin significantly elevated, concern for superimposed bacterial infection  Plan for 7 days of rocephin  Remdesivir, IV steroids, baricitnib  Encourage self prone in, incentive spirometry, ambulation as able  Pulmonary following    Gram-positive bacteremia  Assessment & Plan  One of 2 blood cultures growing Gram-positive cocci, staph coag neg  Likely contaminant, will continue to monitor on IV Rocephin    Sepsis due to COVID-19 [facility])  Assessment & Plan  Leukocytosis and tachypneic on admission in setting of COVID pneumonia  Treatment as above    HUBER (acute kidney injury) ([facility] [address] )  Assessment & Plan  Previous lab shows patient's baseline creatinine roughly around 1 8-2  Likely prerenal in setting of COVID, poor p o   Intake, mildly elevated CK  Nephrology consulted, appreciate recommendations  Slight improvement today in renal functions    Essential hypertension  Assessment & Plan  Continue Norvasc, hold Lasix given HUBER    Hx of CABG  Assessment & Plan  History of CAD status post CABG and stents  Not on aspirin or beta blockers  Continue Imdur and statin    Type 2 diabetes mellitus, without long-term current use of insulin (HCC)  Assessment & Plan  Lab Results   Component Value Date    HGBA1C 8 0 (H) 11/04/2021     Improving appetite, hyperglycemia secondary to steroids  Will increase Lantus to 30 units, at 10 units with meals  Titrate insulin as needed, Accu-Cheks and sliding scale    (P) 268 4      VTE Pharmacologic Prophylaxis:   Pharmacologic: Heparin  Mechanical VTE Prophylaxis in Place: Yes    Patient Centered Rounds: I have performed bedside rounds with nursing staff today  Discussions with Specialists or Other Care Team Provider: Nephrology    Education and Discussions with Family / Patient: Patient, son    Time Spent for Care: 30 minutes  More than 50% of total time spent on counseling and coordination of care as described above  Current Length of Stay: 3 day(s)    Current Patient Status: Inpatient   Certification Statement: The patient will continue to require additional inpatient hospital stay due to IV abx, monitor renal functions, respiratory failure    Discharge Plan: Pending    Code Status: Level 1 - Full Code      Subjective:   No events overnight  Reports chest pain has significantly improve  States he is eating more and feels that he has more strength to work with PT     Objective:     Vitals:   Temp (24hrs), Av 5 °F (36 4 °C), Min:97 4 °F (36 3 °C), Max:97 6 °F (36 4 °C)    Temp:  [97 4 °F (36 3 °C)-97 6 °F (36 4 °C)] 97 6 °F (36 4 °C)  HR:  [74-92] 81  Resp:  [16-24] 16  BP: ()/(53-75) 156/60  SpO2:  [87 %-98 %] 98 %  Body mass index is 39 47 kg/m²  Input and Output Summary (last 24 hours):     No intake or output data in the 24 hours ending 21 1331    Physical Exam:     Physical Exam  Vitals and nursing note reviewed  Constitutional:       Appearance: Normal appearance  He is ill-appearing  HENT:      Head: Normocephalic and atraumatic  Eyes:      General: No scleral icterus  Conjunctiva/sclera: Conjunctivae normal    Cardiovascular:      Rate and Rhythm: Normal rate and regular rhythm  Heart sounds: Normal heart sounds  Pulmonary:      Breath sounds: Rhonchi present  No wheezing     Abdominal:      General: Bowel sounds are normal  There is no distension  Palpations: Abdomen is soft  Tenderness: There is no abdominal tenderness  Musculoskeletal:         General: No swelling  Right lower leg: No edema  Left lower leg: No edema  Skin:     General: Skin is warm and dry  Neurological:      General: No focal deficit present  Mental Status: He is alert  Mental status is at baseline  Additional Data:     Labs:    Results from last 7 days   Lab Units 11/22/21  0548 11/20/21  0443 11/19/21  0652   WBC Thousand/uL 17 07*   < > 19 70*   HEMOGLOBIN g/dL 12 8   < > 13 0   HEMATOCRIT % 40 5   < > 41 6   PLATELETS Thousands/uL 152   < > 106*   BANDS PCT %  --   --  35*   NEUTROS PCT % 88*   < >  --    LYMPHS PCT % 7*   < >  --    LYMPHO PCT %  --   --  11*   MONOS PCT % 4   < >  --    MONO PCT %  --   --  1*   EOS PCT % 0   < > 0    < > = values in this interval not displayed  Results from last 7 days   Lab Units 11/22/21  0548 11/21/21  0452 11/21/21  0452   SODIUM mmol/L 136   < > 132*   POTASSIUM mmol/L 4 8   < > 5 4*   CHLORIDE mmol/L 101   < > 100   CO2 mmol/L 21   < > 21   BUN mg/dL 62*   < > 55*   CREATININE mg/dL 2 48*   < > 2 46*   ANION GAP mmol/L 14*   < > 11   CALCIUM mg/dL 8 7   < > 8 3   ALBUMIN g/dL  --   --  2 0*   TOTAL BILIRUBIN mg/dL  --   --  0 65   ALK PHOS U/L  --   --  151*   ALT U/L  --   --  41   AST U/L  --   --  98*   GLUCOSE RANDOM mg/dL 228*   < > 285*    < > = values in this interval not displayed       Results from last 7 days   Lab Units 11/19/21  0652   INR  1 08     Results from last 7 days   Lab Units 11/22/21  1114 11/22/21  0742 11/22/21  0353 11/22/21  0157 11/21/21  1957 11/21/21  1630 11/21/21  1105 11/21/21  0733 11/20/21  2133 11/20/21  1634 11/20/21  1105 11/20/21  0746   POC GLUCOSE mg/dl 285* 218* 280* 315* 396* 319* 354* 300* 312* 283* 195* 205*         Results from last 7 days   Lab Units 11/21/21  0452 11/20/21  0443 11/19/21  0846 11/19/21  0652   LACTIC ACID mmol/L  --   -- 2  3* 2 3*   PROCALCITONIN ng/ml 21 52* 36 64*  --  24 66*           * I Have Reviewed All Lab Data Listed Above  * Additional Pertinent Lab Tests Reviewed: Luis Antonio 66 Admission Reviewed    Imaging:    XR chest 1 view portable    Result Date: 11/19/2021  Impression: Moderate bilateral groundglass opacity due to Covid-19  Workstation performed: [id number]       Recent Cultures (last 7 days):     Results from last 7 days   Lab Units 11/19/21  0652 11/19/21  0650   BLOOD CULTURE  Staphylococcus aureus*  Staphylococcus epidermidis* No Growth at 72 hrs     GRAM STAIN RESULT  Gram positive cocci in clusters*  --        Last 24 Hours Medication List:   Current Facility-Administered Medications   Medication Dose Route Frequency Provider Last Rate    acetaminophen  650 mg Oral Q6H PRN Lima Pritchett MD      amLODIPine  5 mg Oral Daily CIELO Jones      atorvastatin  40 mg Oral HS Yfn Issa MD      Baricitinib  1 mg Oral Q24H Lima Pritchett MD      cefTRIAXone  1,000 mg Intravenous Q24H Lima Pritchett MD 1,000 mg (11/22/21 0915)    dexamethasone  0 1 mg/kg Intravenous Q12H [facility] CIELO Keita 11 1 mg (11/22/21 8880)    doxycycline hyclate  100 mg Oral Q12H Lima Pritchett MD      guaiFENesin  600 mg Oral Q12H [facility] Lima Pritchett MD      heparin (porcine)  5,000 Units Subcutaneous [facility] Lima Pritchett MD      hydrocodone-chlorpheniramine polistirex  5 mL Oral Q12H PRN Doug Issa MD      hydrOXYzine HCL  25 mg Oral Q6H PRN CIELO Pulido      insulin glargine  10 Units Subcutaneous Once Lima Pritchett MD      insulin glargine  30 Units Subcutaneous HS Lima Pritchett MD      insulin lispro  1-5 Units Subcutaneous TID AC Lima Pritchett MD      insulin lispro  10 Units Subcutaneous TID With Meals Lima Pritchett MD      isosorbide mononitrate  60 mg Oral QAM CIELO Jones      loratadine  10 mg Oral Daily Lima Pritchett MD      melatonin  6 mg Oral CIELO Claudio      oxyCODONE  5 mg Oral Q4H PRN Ishan Smart MD      pantoprazole  40 mg Oral Early Morning Ishan Smart MD      phenol  1 spray Mouth/Throat Q2H PRN Ishan Smart MD      remdesivir  100 mg Intravenous Q24H Ishan Smart MD 0 mg (11/20/21 1014)    sodium bicarbonate  650 mg Oral BID after meals [place] PoCIELO manzanares      sodium chloride  1 spray Each Nare Q1H PRN Ishan Smart MD      tiZANidine  4 mg Oral Q8H PRN Ishan Smart MD          Today, Patient Was Seen By: Ishan Smart MD    ** Please Note: Dictation voice to text software may have been used in the creation of this document   **

## 2021-11-22 NOTE — PLAN OF CARE
Problem: PAIN - ADULT  Goal: Verbalizes/displays adequate comfort level or baseline comfort level  Description: Interventions:  - Encourage patient to monitor pain and request assistance  - Assess pain using appropriate pain scale  - Administer analgesics based on type and severity of pain and evaluate response  - Implement non-pharmacological measures as appropriate and evaluate response  - Consider cultural and social influences on pain and pain management  - Notify physician/advanced practitioner if interventions unsuccessful or patient reports new pain  Outcome: Progressing     Problem: INFECTION - ADULT  Goal: Absence or prevention of progression during hospitalization  Description: INTERVENTIONS:  - Assess and monitor for signs and symptoms of infection  - Monitor lab/diagnostic results  - Monitor all insertion sites, i e  indwelling lines, tubes, and drains  - Monitor endotracheal if appropriate and nasal secretions for changes in amount and color  - [place] appropriate cooling/warming therapies per order  - Administer medications as ordered  - Instruct and encourage patient and family to use good hand hygiene technique  - Identify and instruct in appropriate isolation precautions for identified infection/condition  Outcome: Progressing     Problem: SAFETY ADULT  Goal: Patient will remain free of falls  Description: INTERVENTIONS:  - Educate patient/family on patient safety including physical limitations  - Instruct patient to call for assistance with activity   - Consult OT/PT to assist with strengthening/mobility   - Keep Call bell within reach  - Keep bed low and locked with side rails adjusted as appropriate  - Keep care items and personal belongings within reach  - Initiate and maintain comfort rounds  - Make Fall Risk Sign visible to staff  - Offer Toileting every *2** Hours, in advance of need  - Initiate/Maintain *BED**alarm  - Apply yellow socks and bracelet for high fall risk patients  - Consider moving patient to room near nurses station  Outcome: Progressing  Goal: Maintain or return to baseline ADL function  Description: INTERVENTIONS:  -  Assess patient's ability to carry out ADLs; assess patient's baseline for ADL function and identify physical deficits which impact ability to perform ADLs (bathing, care of mouth/teeth, toileting, grooming, dressing, etc )  - Assess/evaluate cause of self-care deficits   - Assess range of motion  - Assess patient's mobility; develop plan if impaired  - Assess patient's need for assistive devices and provide as appropriate  - Encourage maximum independence but intervene and supervise when necessary  - Involve family in performance of ADLs  - Assess for home care needs following discharge   - Consider OT consult to assist with ADL evaluation and planning for discharge  - Provide patient education as appropriate  Outcome: Progressing  Goal: Maintains/Returns to pre admission functional level  Description: INTERVENTIONS:  - Perform BMAT or MOVE assessment daily    - Set and communicate daily mobility goal to care team and patient/family/caregiver  - Collaborate with rehabilitation services on mobility goals if consulted  - Perform Range of Motion *3** times a day  - Reposition patient every **2* hours    - Dangle patient *3** times a day  - Stand patient *3** times a day  - Ambulate patient **3* times a day  - Out of bed to chair *3** times a day   - Out of bed for meals **3* times a day  - Out of bed for toileting  - Record patient progress and toleration of activity level   Outcome: Progressing     Problem: RESPIRATORY - ADULT  Goal: Achieves optimal ventilation and oxygenation  Description: INTERVENTIONS:  - Assess for changes in respiratory status  - Assess for changes in mentation and behavior  - Position to facilitate oxygenation and minimize respiratory effort  - Oxygen administered by appropriate delivery if ordered  - Initiate smoking cessation education as indicated  - Encourage broncho-pulmonary hygiene including cough, deep breathe, Incentive Spirometry  - Assess the need for suctioning and aspirate as needed  - Assess and instruct to report SOB or any respiratory difficulty  - Respiratory Therapy support as indicated  Outcome: Progressing     Problem: Potential for Falls  Goal: Patient will remain free of falls  Description: INTERVENTIONS:  - Educate patient/family on patient safety including physical limitations  - Instruct patient to call for assistance with activity   - Consult OT/PT to assist with strengthening/mobility   - Keep Call bell within reach  - Keep bed low and locked with side rails adjusted as appropriate  - Keep care items and personal belongings within reach  - Initiate and maintain comfort rounds  - Make Fall Risk Sign visible to staff  - Offer Toileting every *2** Hours, in advance of need  - Apply yellow socks and bracelet for high fall risk patients  - Consider moving patient to room near nurses station  Outcome: Progressing     Problem: CARDIOVASCULAR - ADULT  Goal: Maintains optimal cardiac output and hemodynamic stability  Description: INTERVENTIONS:  - Monitor I/O, vital signs and rhythm  - Monitor for S/S and trends of decreased cardiac output  - Administer and titrate ordered vasoactive medications to optimize hemodynamic stability  - Assess quality of pulses, skin color and temperature  - Assess for signs of decreased coronary artery perfusion  - Instruct patient to report change in severity of symptoms  Outcome: Progressing     Problem: GENITOURINARY - ADULT  Goal: Maintains or returns to baseline urinary function  Description: INTERVENTIONS:  - Assess urinary function  - Encourage oral fluids to ensure adequate hydration if ordered  - Administer IV fluids as ordered to ensure adequate hydration  - Administer ordered medications as needed  - Offer frequent toileting  - Follow urinary retention protocol if ordered  Outcome: Progressing     Problem: METABOLIC, FLUID AND ELECTROLYTES - ADULT  Goal: Electrolytes maintained within normal limits  Description: INTERVENTIONS:  - Monitor labs and assess patient for signs and symptoms of electrolyte imbalances  - Administer electrolyte replacement as ordered  - Monitor response to electrolyte replacements, including repeat lab results as appropriate  - Instruct patient on fluid and nutrition as appropriate  Outcome: Progressing  Goal: Fluid balance maintained  Description: INTERVENTIONS:  - Monitor labs   - Monitor I/O and WT  - Instruct patient on fluid and nutrition as appropriate  - Assess for signs & symptoms of volume excess or deficit  Outcome: Progressing  Goal: Glucose maintained within target range  Description: INTERVENTIONS:  - Monitor Blood Glucose as ordered  - Assess for signs and symptoms of hyperglycemia and hypoglycemia  - Administer ordered medications to maintain glucose within target range  - Assess nutritional intake and initiate nutrition service referral as needed  Outcome: Progressing     Problem: MUSCULOSKELETAL - ADULT  Goal: Maintain or return mobility to safest level of function  Description: INTERVENTIONS:  - Assess patient's ability to carry out ADLs; assess patient's baseline for ADL function and identify physical deficits which impact ability to perform ADLs (bathing, care of mouth/teeth, toileting, grooming, dressing, etc )  - Assess/evaluate cause of self-care deficits   - Assess range of motion  - Assess patient's mobility  - Assess patient's need for assistive devices and provide as appropriate  - Encourage maximum independence but intervene and supervise when necessary  - Involve family in performance of ADLs  - Assess for home care needs following discharge   - Consider OT consult to assist with ADL evaluation and planning for discharge  - Provide patient education as appropriate  Outcome: Progressing     Problem: Prexisting or High Potential for Compromised Skin Integrity  Goal: Skin integrity is maintained or improved  Description: INTERVENTIONS:  - Identify patients at risk for skin breakdown  - Assess and monitor skin integrity  - Assess and monitor nutrition and hydration status  - Monitor labs   - Assess for incontinence   - Turn and reposition patient  - Assist with mobility/ambulation  - Relieve pressure over bony prominences  - Avoid friction and shearing  - Provide appropriate hygiene as needed including keeping skin clean and dry  - Evaluate need for skin moisturizer/barrier cream  - Collaborate with interdisciplinary team   - Patient/family teaching  - Consider wound care consult   Outcome: Progressing     Problem: MOBILITY - ADULT  Goal: Maintain or return to baseline ADL function  Description: INTERVENTIONS:  -  Assess patient's ability to carry out ADLs; assess patient's baseline for ADL function and identify physical deficits which impact ability to perform ADLs (bathing, care of mouth/teeth, toileting, grooming, dressing, etc )  - Assess/evaluate cause of self-care deficits   - Assess range of motion  - Assess patient's mobility; develop plan if impaired  - Assess patient's need for assistive devices and provide as appropriate  - Encourage maximum independence but intervene and supervise when necessary  - Involve family in performance of ADLs  - Assess for home care needs following discharge   - Consider OT consult to assist with ADL evaluation and planning for discharge  - Provide patient education as appropriate  Outcome: Progressing  Goal: Maintains/Returns to pre admission functional level  Description: INTERVENTIONS:  - Perform BMAT or MOVE assessment daily    - Set and communicate daily mobility goal to care team and patient/family/caregiver  - Collaborate with rehabilitation services on mobility goals if consulted  - Perform Range of Motion *3** times a day  - Reposition patient every *2** hours    - Dangle patient **3* times a day  - Stand patient *3** times a day  - Ambulate patient **3* times a day  - Out of bed to chair **3* times a day   - Out of bed for meals *3** times a day  - Out of bed for toileting  - Record patient progress and toleration of activity level   Outcome: Progressing

## 2021-11-22 NOTE — PLAN OF CARE
Problem: OCCUPATIONAL THERAPY ADULT  Goal: Performs self-care activities at highest level of function for planned discharge setting  See evaluation for individualized goals  Description: Treatment Interventions: ADL retraining,Functional transfer training,UE strengthening/ROM,Endurance training,Cognitive reorientation,Patient/family training,Equipment evaluation/education,Compensatory technique education,Continued evaluation,Energy conservation          See flowsheet documentation for full assessment, interventions and recommendations  Note: Limitation: Decreased ADL status,Decreased UE strength,Decreased Safe judgement during ADL,Decreased cognition,Decreased endurance,Decreased high-level ADLs  Prognosis: Fair  Assessment: Pt is a 67y/o male admitted to the hospital 2* symptoms of SOB, cough  Pt noted with acute hypoxemic respiratory failure 2* PNA/COVID-19, sepsis, and HUBER  Pt with PMH CKD, CAD--s/p CABG, DM, GERD, and HTN  Pt's premorbid information limited 2* pt's limited verbalization, low volume  During initial eval, pt demonstrated deficits with his functional balance, functional mobility, ADL status, transfer safety, activity tolerance(currently poor=5-10mins), and questionable cognition(i e orientation, memory)  Pt would benefit from continued OT tx for the above deficits  3-5xwk/1-2wks        OT Discharge Recommendation: Post acute rehabilitation services

## 2021-11-22 NOTE — OCCUPATIONAL THERAPY NOTE
Occupational Therapy Evaluation(time=1150-1210)     Patient Name: Aldo Vazquez Date: 11/22/2021  Problem List  Principal Problem:    Acute hypoxemic respiratory failure due to COVID-19 [facility])  Active Problems:    Peripheral vascular disease, unspecified ([place] [address] )    Type 2 diabetes mellitus, without long-term current use of insulin (HCC)    Hx of CABG    Essential hypertension    CKD (chronic kidney disease)    HUBER (acute kidney injury) ([place] [address] )    Sepsis due to COVID-19 ([place] [address] )    Gram-positive bacteremia    Hyperkalemia    Past Medical History  Past Medical History:   Diagnosis Date    Chronic kidney disease     Coronary artery disease     Diabetes ([place] [address] )     Diabetes mellitus ([place] [address] )     GERD (gastroesophageal reflux disease)     Hypertension      Past Surgical History  Past Surgical History:   Procedure Laterality Date    CORONARY ARTERY BYPASS GRAFT  2008    MYRINGOTOMY W/ TUBES Bilateral 02/20/2019    [facility] - Dr Hernandez Gower - T tubes             11/22/21 1150   Note Type   Note type Evaluation   Restrictions/Precautions   Weight Bearing Precautions Per Order No   Other Precautions Fall Risk;Pain;Contact/isolation; Airborne/isolation; Chair Alarm; Bed Alarm;O2;Multiple lines;Telemetry   Pain Assessment   Pain Assessment Tool FLACC   Pain Location/Orientation Location: Abdomen   Pain Rating: FLACC (Rest) - Face 0   Pain Rating: FLACC (Rest) - Legs 0   Pain Rating: FLACC (Rest) - Activity 0   Pain Rating: FLACC (Rest) - Cry 1   Pain Rating: FLACC (Rest) - Consolability 0   Score: FLACC (Rest) 1   Home Living   Type of Home House   Prior Function   Lives With Spouse   Lifestyle   Autonomy Pt's premorbid information limited 2* pt's limited verbalization, low volume  Reciprocal Relationships son   Service to Others works as a    Intrinsic Gratification watching [address] X   Patient Behaviors/Mood Flat affect; Cooperative   Subjective   Subjective "I'm tired "   ADL   Where Assessed Edge of bed   Eating Assistance 6  Modified independent   Grooming Assistance 6  Modified Independent   UB Bathing Assistance 5  Supervision/Setup   LB Bathing Assistance 4  Minimal Assistance   UB Dressing Assistance 5  Supervision/Setup   LB Dressing Assistance 4  Minimal Assistance   Bed Mobility   Rolling R 5  Supervision   Additional items Increased time required;Verbal cues;LE management   Rolling L 5  Supervision   Supine to Sit 4  Minimal assistance   Additional items Assist x 1; Increased time required;Verbal cues;LE management   Sit to Supine 4  Minimal assistance   Additional items Assist x 1; Increased time required;Verbal cues;LE management   Transfers   Sit to Stand 4  Minimal assistance   Additional items Assist x 1; Increased time required;Verbal cues   Stand to Sit 4  Minimal assistance   Additional items Assist x 1; Increased time required;Verbal cues   Functional Mobility   Functional Mobility 4  Minimal assistance   Additional Comments x1   Additional items Hand hold assistance   Balance   Static Sitting Fair +   Dynamic Sitting Fair   Static Standing Poor +   Dynamic Standing Poor   Activity Tolerance   Activity Tolerance Patient limited by fatigue;Treatment limited secondary to medical complications (Comment)  (SPO2=81-90 with oxygen)   RUE Assessment   RUE Assessment WFL   RUE Strength   RUE Overall Strength Within Functional Limits - able to perform ADL tasks with strength  (4/5 throughout)   LUE Assessment   LUE Assessment WFL   LUE Strength   LUE Overall Strength Within Functional Limits - able to perform ADL tasks with strength  (4/5 throughout)   Hand Function   Gross Motor Coordination Functional   Fine Motor Coordination Functional   Sensation   Light Touch No apparent deficits   Proprioception   Proprioception No apparent deficits   Vision-Basic Assessment   Current Vision   (glasses)   Vision - Complex Assessment   Visual Fields   (able to scan visual fields)   Perception Inattention/Neglect Appears intact   Cognition   Overall Cognitive Status WFL   Arousal/Participation Alert   Attention Attends with cues to redirect   Orientation Level Oriented to person;Oriented to place; Disoriented to time   Memory Decreased short term memory;Decreased recall of precautions   Following Commands Follows one step commands with increased time or repetition   Assessment   Limitation Decreased ADL status; Decreased UE strength;Decreased Safe judgement during ADL;Decreased cognition;Decreased endurance;Decreased high-level ADLs   Prognosis Fair   Assessment Pt is a 67y/o male admitted to the hospital 2* symptoms of SOB, cough  Pt noted with acute hypoxemic respiratory failure 2* PNA/COVID-19, sepsis, and HUBER  Pt with PMH CKD, CAD--s/p CABG, DM, GERD, and HTN  Pt's premorbid information limited 2* pt's limited verbalization, low volume  During initial eval, pt demonstrated deficits with his functional balance, functional mobility, ADL status, transfer safety, activity tolerance(currently poor=5-10mins), and questionable cognition(i e orientation, memory)  Pt would benefit from continued OT tx for the above deficits  3-5xwk/1-2wks  Goals   Patient Goals "to get better"   STG Time Frame   (1-7 days)   Short Term Goal #1 Pt will tolerate continued cognitive/home-safety assessment and appropriate d/c recommendations will be provided  Short Term Goal #2 Pt will demonstrate mod I with their sit-stand transfers to assist with completion of their LE dressing  Short Term Goal  Pt will demonstrate improved activity tolerance to good(20-30mins) and standing tolerance to 3-5mins to assist with ADLs  LTG Time Frame   (7-14 days)   Long Term Goal #1 Pt will demonstrate proper walker/transfer safety 100% of the time  Long Term Goal #2 Pt will demonstrate g/g- balance with all functional activities  Long Term Goal Pt will demonstrate mod I with their UE and LE bathing/dresssing      Plan   Treatment Interventions ADL retraining;Functional transfer training;UE strengthening/ROM; Endurance training;Cognitive reorientation;Patient/family training;Equipment evaluation/education; Compensatory technique education;Continued evaluation; Energy conservation   Goal Expiration Date 12/06/21   OT Treatment Day 0   OT Frequency 3-5x/wk   Recommendation   OT Discharge Recommendation Post acute rehabilitation services   AM-PAC Daily Activity Inpatient   Lower Body Dressing 3   Bathing 3   Toileting 3   Upper Body Dressing 3   Grooming 3   Eating 4   Daily Activity Raw Score 19   Daily Activity Standardized Score (Calc for Raw Score >=11) 40 22   AM-PAC Applied Cognition Inpatient   Following a Speech/Presentation 3   Understanding Ordinary Conversation 3   Taking Medications 3   Remembering Where Things Are Placed or Put Away 3   Remembering List of 4-5 Errands 3   Taking Care of Complicated Tasks 3   Applied Cognition Raw Score 18   Applied Cognition Standardized Score 38 07   Chi Coho, OT

## 2021-11-22 NOTE — ASSESSMENT & PLAN NOTE
24-year-old male, history of type 2 diabetes, CAD, hypertension presented with shortness of breath and cough  Patient found to be septic, tachypnea, leukocytosis  COVID positive 11/17, unvaccinated  Checks x-ray shows multifocal pneumonia  IV fluids, IV antibiotics Rocephin, procalcitonin significantly elevated, concern for superimposed bacterial infection  Plan for 7 days of rocephin  Remdesivir, IV steroids, baricitnib  Encourage self prone in, incentive spirometry, ambulation as able  Pulmonary following

## 2021-11-22 NOTE — PROGRESS NOTES
Progress Note - Pulmonary   Shasta Parker 77 y o  male MRN: [id number]  Unit/Bed#: [place] [address] -01 Encounter: [id number]      Assessment/Plan:         1  Acute hypoxic respiratory failure secondary to COVID 19  1  Titrate oxygen as needed to maintain SpO2 >/=88%  2  Pulmonary toilet: IS, cough deep breathe  3  Side lying and prone position  4  Current O2 needs 15L midflow and 15L NRB  2  COVID 19 moderate protocol started  1  Recommendations  1  No changes  2  Labs  1   6--215 3--195 4  2  Procalcitonin 24 66-36 64--21 52  3  BNP 3590  4  Troponin  ENL   5  HIV NA  6  hepatitis panel NA   3  Medications  1  lipitor  2  Anticoagulation DVT prophylaxis  3  Remdesivir day 4/5  4  decadron dosing 0 1mg/kg BID 4/10 days  5  Diuretics held with HUBER  6  Baricitinib day 3/14  7  Convalsecent plasma NA  8  Antibiotics Continue doxycycline and rocephin  3  HUBER on CKD IIIB  1  Suspected ATN secondary to COVID 19 and volume overload  2  Nephrology following  3  Avoid nephrotoxins  4  Strict I &O  4  Hyperkalemia  1  improving  5  Hyponatremia  1  Likely secondary to decadron and resolved  6  Elevated BNP  1  Echo with moderate/severe LVH EF 67% without evidence of pulmonary HTN  2  Consider cardiology evaluation          Subjective:     Teresita Miramontes Was seen in the emergency room  Denies any events  Continues to complain of chest discomfort and dyspnea  Has a positive nonproductive cough  Denies: Fevers, chills or hemoptysis  Objective:         Vitals: Blood pressure 156/60, pulse 81, temperature 97 6 °F (36 4 °C), resp  rate 16, height 5' 4" (1 626 m), weight 104 kg (229 lb 15 oz), SpO2 98 %  , 15L midflow and NRB, Body mass index is 39 47 kg/m²      No intake or output data in the 24 hours ending 11/22/21 1218      Physical Exam  Gen: Awake, alert, oriented x 3, no acute distress  HEENT: Mucous membranes moist, no oral lesions, no thrush  NECK: no accessory muscle use, JVP not elevated  Cardiac: Regular, single S1, single S2, no murmurs, no rubs, no gallops  Lungs: decreased breath sounds bilaterally  Abdomen: obese, normoactive bowel sounds, soft nontender, nondistended, no rebound or rigidity, no guarding  Extremities: no cyanosis, no clubbing, no edema    Labs: I have personally reviewed pertinent lab results  , CBC:   Lab Results   Component Value Date    WBC 17 07 (H) 11/22/2021    HGB 12 8 11/22/2021    HCT 40 5 11/22/2021    MCV 77 (L) 11/22/2021     11/22/2021    MCH 24 4 (L) 11/22/2021    MCHC 31 6 11/22/2021    RDW 16 2 (H) 11/22/2021    MPV 10 4 11/22/2021    NRBC 0 11/22/2021   , CMP:   Lab Results   Component Value Date    SODIUM 136 11/22/2021    K 4 8 11/22/2021     11/22/2021    CO2 21 11/22/2021    BUN 62 (H) 11/22/2021    CREATININE 2 48 (H) 11/22/2021    CALCIUM 8 7 11/22/2021    EGFR 26 11/22/2021     Imaging and other studies: I have personally reviewed pertinent reports     and Echocardiogram 11/21/21   EF 67% with moderate to severe LVH    CIELO Brunner

## 2021-11-22 NOTE — PHYSICAL THERAPY NOTE
PHYSICAL THERAPY EVALUATION          Patient Name: Mckay Donaldson Date: 11/22/2021   PT EVALUATION    77 y o     [id number]    Dyspnea [R06 00]  SOB (shortness of breath) [R06 02]  Hypoxia [R09 02]  Sepsis (HCC) [A41 9]  Pneumonia due to COVID-19 virus [U07 1, J12 82]  COVID-19 [U07 1]    Past Medical History:   Diagnosis Date    Chronic kidney disease     Coronary artery disease     Diabetes ([facility] )     Diabetes mellitus ([facility] )     GERD (gastroesophageal reflux disease)     Hypertension      Past Surgical History:   Procedure Laterality Date    CORONARY ARTERY BYPASS GRAFT  2008    MYRINGOTOMY W/ TUBES Bilateral 02/20/2019    [facility] - Dr Esperanza Goffr - T tubes        11/22/21 1208   PT Last Visit   PT Visit Date 11/22/21   Note Type   Note type Evaluation   Pain Assessment   Pain Assessment Tool 0-10   Pain Score 10 - Worst Possible Pain   Pain Location/Orientation Orientation: Upper; Location: Abdomen   Hospital Pain Intervention(s) Repositioned; Ambulation/increased activity; Emotional support; Rest   Home Living   Type of Home House   Home Layout Multi-level   Additional Comments soft spoken, difficult to obtain social hx/ PLOF   Prior Function   Lives With Spouse   Receives Help From Family   Comments soft spoken, difficult to obtain social hx/ PLOF   Restrictions/Precautions   Other Precautions Contact/isolation; Airborne/isolation; Chair Alarm; Bed Alarm;Multiple lines;Telemetry;O2;Fall Risk;Pain  (midflow and NRB, PIV, telemetry)   General   Additional Pertinent History pt admitted 11/19/21 for acute hypoxemic respiratory failure d/t covid  covid + 11/17  ambulate patient orders      Cognition   Overall Cognitive Status WFL   Arousal/Participation Cooperative   Attention Attends with cues to redirect   Orientation Level Oriented to person;Oriented to place;Oriented to time   Memory Decreased recall of precautions   Following Commands Follows one step commands with increased time or repetition   RLE Assessment   RLE Assessment WFL   LLE Assessment   LLE Assessment WFL   Bed Mobility   Rolling R 5  Supervision   Additional items Bedrails   Supine to Sit 4  Minimal assistance   Additional items Assist x 1;Bedrails; Increased time required;Verbal cues   Sit to Supine 5  Supervision   Additional items Increased time required   Additional Comments A for trunk support supine>sit   Transfers   Sit to Stand 4  Minimal assistance   Additional items Assist x 1; Increased time required; Bedrails   Stand to Sit 4  Minimal assistance   Additional items Assist x 1;Bedrails; Increased time required   Additional Comments A for steadying   Ambulation/Elevation   Gait pattern Improper Weight shift; Short stride; Excessively slow  (unsteady)   Gait Assistance 4  Minimal assist   Additional items Assist x 1   Assistive Device None   Distance 2' along EOB   Balance   Static Standing Fair -   Dynamic Standing Poor +   Ambulatory Poor +   Endurance Deficit   Endurance Deficit Yes   Endurance Deficit Description pt feeling generally unwell impacting activity tolerance  O2 80-85% on midflow  RN present to assist w putting NRB on  O2 81-90% on NRB  pt also reports feeling dizzy in standing   Activity Tolerance   Activity Tolerance Patient limited by pain;Treatment limited secondary to medical complications (Comment)  (O2 sats, dizziness)   Medical Staff Made Aware Joel OT   Nurse [address] Miranda Se RN   Assessment   Prognosis Fair   Problem List Decreased strength;Decreased endurance; Impaired balance;Decreased mobility;Obesity;Pain   Assessment Manju Dumont is a 77 y o  male admitted to [address] on 11/19/2021 for Acute hypoxemic respiratory failure due to COVID-19 [facility])  Pt  has a past medical history of  Diabetes mellitus ([address] ), GERD (gastroesophageal reflux disease)  PT was consulted and pt was seen on 11/22/2021 for mobility assessment and d/c planning   Pt presents w contact and airborne precautions, multiple lines, pain  RN aware of upper central abdominal pain  Also present to assist w managing oxygen as pt desat on midflow only; placed on NRB durin session  Difficult to obtain social hx or PLOF d/t noise and communication barriers  Pt is currently functioning at a min A x1 level  Unsteady on his feet reaching out to furniture for support  Would benefit from trial of RW next session  Assessment of mobility limited at this time d/t medical complications including dizziness, O2 desat, pt not feeling well/ limited activity tolerance  Pt will benefit from continued skilled IP PT to address the above mentioned impairments  in order to maximize recovery and increase functional independence when completing mobility and ADLs  Currently PT recommendations for DME include are pending further assessment  At this time PT recommendations for d/c are STR given decline in function impacting safe dc home  Barriers to Discharge Other (Comment)  (unable to negotiate home environment (walking dist, steps))   Goals   Patient Goals to feel better   STG Expiration Date 12/06/21   Short Term Goal #1 1)  Pt will perform bed mobility with Doni demonstrating appropriate technique 100% of the time in order to improve function  2)  Perform all transfers with Doni demonstrating safe and appropriate technique 100% of the time in order to improve ability to negotiate safely in home environment  3) Amb with least restrictive AD > 50'x1 with mod I in order to demonstrate ability to negotiate in home environment  4)  Improve overall strength and balance 1/2 grade in order to optimize ability to perform functional tasks and reduce fall risk  5) Increase activity tolerance to 45 minutes in order to improve endurance to functional tasks  6)  Negotiate stairs using most appropriate technique and S in order to be able to negotiate safely in home environment   7) PT for ongoing patient and family/caregiver education, DME needs and d/c planning in order to promote highest level of function in least restrictive environment  Plan   Treatment/Interventions Functional transfer training;LE strengthening/ROM; Therapeutic exercise;Elevations; Endurance training;Patient/family training;Bed mobility; Equipment eval/education;Gait training;Continued evaluation; Compensatory technique education;Spoke to nursing;OT   PT Frequency Other (Comment)  (4-5x)   Recommendation   PT Discharge Recommendation Post acute rehabilitation services   PT - OK to Discharge Yes   Additional Comments The patient's AM-PAC Basic Mobility Inpatient Short Form Raw Score is 17  A Raw score of less than or equal to 16 suggests the patient may benefit from discharge to post-acute rehabilitation services  Please also refer to the recommendation of the Physical Therapist for safe discharge planning     AM-PAC Basic Mobility Inpatient   Turning in Bed Without Bedrails 4   Lying on Back to Sitting on Edge of Flat Bed 3   Moving Bed to Chair 3   Standing Up From Chair 3   Walk in Room 3   Climb 3-5 Stairs 1   Basic Mobility Inpatient Raw Score 17   Basic Mobility Standardized Score 39 67   History: co - morbidities, fall risk, multiple lines, airborne isolation  Exam: impairments in systems including musculoskeletal (strength), neuromuscular (balance, gait, transfers, motor function), am-pac, cardiopulmonary, cognition  Clinical: unstable/unpredictable  Complexity:high      Constantino Forward, PT

## 2021-11-22 NOTE — UTILIZATION REVIEW
Initial Clinical Review    Admission: Date/Time/Statement:   Admission Orders (From admission, onward)     Ordered        11/19/21 0820  Inpatient Admission  Once                      Orders Placed This Encounter   Procedures    Inpatient Admission     Standing Status:   Standing     Number of Occurrences:   1     Order Specific Question:   Level of Care     Answer:   Med Surg [16]     Order Specific Question:   Estimated length of stay     Answer:   More than 2 Midnights     Order Specific Question:   Certification     Answer:   I certify that inpatient services are medically necessary for this patient for a duration of greater than two midnights  See H&P and MD Progress Notes for additional information about the patient's course of treatment  ED Arrival Information     Expected Arrival Acuity    - 11/19/2021 06:27 Emergent         Means of arrival Escorted by Service Admission type    Ambulance [facility] ([address]) Hospitalist Emergency         Arrival complaint    SOB        Chief Complaint   Patient presents with    Shortness of Breath     diagnosed with Covid on monday; pt is experiencing worsening SOB and coughing; RA 70-80%     Initial Presentation:  Mr Mignon Rivera is a 78 yo male who presents to the ED via EMS from home with c/o SOB, productive cough, fevers, SOB, body aches  Entire family was ill  Previously dx with Covid before admission  He is unvaccinated  In the ED he was placed on oxygen at 6L but was increased to 15L NRB mask  Imaging shows multifocal pneumonia  PMH: HTN, CAD, Type2 DM  He is admitted to INPATIENT status with Acute hypoxic respiratory failure d/t Covid - IV fluids, IV steroids and REmdesivir, consult Pulmonary, hold Lasix d/t hypovolemia, trend inflammatory markers  Sepsis d/t Covid - IV antibiotics  HUBER - gentle IV fluids  HTN - Norvasc, hold Lasix d/t HUBER  NIDDM - SSI cover       11/19 Pulmonary Consult - Acute hypoxic respiratory failure secondary to COVID 19 - oxygen, pulmonary toilet, steroids, elevated CRP and Procalcitonin, IV Lasix, possible Barcitinib on 11/20  Date: 11/20   Day 2:   Pt still with chest discomfort, dyspnea and cough  Hyperkalemia - 5 5 - treated with IV Insulin  Starting Baricitinib  Gram + bacteremia in 1/2 cultures - suspect contaminant  HUBER - consult Nephrology  Continue to hole IV Lasix  Anxiety improving with Atarax  11/20 Nephrology Consult - Non-Oliguric KDIGO HUBER stage 1 on CKD G3 - hold parameters for antihypertensives, avoid nephrotoxics, adjust meds to GFR, fluid overload s/p Lasix, Kayex if K > 5 5, on mid flow oxygen        ED Triage Vitals [11/19/21 0631]   Temperature Pulse Respirations Blood Pressure SpO2   97 9 °F (36 6 °C) 56 (!) 26 162/71 92 %      Temp Source Heart Rate Source Patient Position - Orthostatic VS BP Location FiO2 (%)   Oral Monitor Lying Right arm --      Pain Score       7          Wt Readings from Last 1 Encounters:   11/22/21 104 kg (229 lb 15 oz)     Additional Vital Signs:   11/20/21 23:00:22 -- 97 22 164/74 104 87 % Abnormal  80 15 L/min 15 L/min Mid flow nasal cannula --   11/20/21 19:35:37 98 °F (36 7 °C) 80 -- 157/69 98 92 % -- -- -- -- --   11/20/21 15:27:36 98 1 °F (36 7 °C) 78 19 131/47 Abnormal  75 94 % 80 15 L/min 15 L/min Mid flow nasal cannula  Lying   11/20/21 1208 -- 86 -- -- -- 90 % 80 15 L/min 15 L/min Mid flow nasal cannula  --   11/20/21 1115 -- -- -- -- -- 86 % Abnormal  80 15 L/min 15 L/min Mid flow nasal cannula  --   11/20/21 11:05:34 98 °F (36 7 °C) 86 22 136/55 82 76 % Abnormal  80 -- 15 L/min Mid flow nasal cannula Sitting   11/20/21 07:48:32 98 9 °F (37 2 °C) 70 19 141/66 91 96 % 80 15 L/min 15 L/min Mid flow nasal cannula  Lying   11/20/21 0715 -- -- -- -- -- 87 % Abnormal  80 15 L/min 15 L/min Mid flow nasal cannula  --   11/20/21 0700 -- 67 -- -- -- 89 % Abnormal  80 15 L/min 15 L/min Mid flow nasal cannula  --   11/20/21 05:38:45 99 2 °F (37 3 °C) 66 -- 128/68 88 89 % Abnormal  -- -- -- -- --      11/19/21 14:32:12 11/19/21 10:19:20   Vital Signs   Temp 97 6 °F (36 4 °C) --   Temp src Oral --   Pulse 74 55   Heart Rate Source Monitor --   Resp 24 Abnormal  --   /68 158/68   MAP (mmHg) 84 98   Girls Systolic BP Percentile -- --   Girls Diastolic BP Percentile -- --        SpO2 -- 90 % 95 % 93 % --   SpO2 Activity -- At Rest -- -- --   O2 Device -- Mid flow nasal cannula Mid flow nasal cannula  NRB 15L PRN -- --   Nasal Cannula O2 Flow Rate (L/min) 15 L/min 15 L/min 15 L/min -- --   O2 Flow Rate (L/min) 15 L/min  NRB 15 L/min  NRB -- -- --   Calculated FIO2 (%) - Nasal Cannula 80 80 80          11/19/21 0730 11/19/21 0631   Vital Signs   Temp -- 97 9 °F (36 6 °C)   Temp src -- Oral   Pulse 58 56   Heart Rate Source Monitor Monitor   Resp 22 26 Abnormal    /71 162/71   MAP (mmHg) 102 --     Oxygen Therapy        SpO2 96 % -- -- 92 %         SpO2 Activity At Rest -- -- --         O2 Device Mid flow nasal cannula Nasal cannula  6 L -- Nasal cannula         Nasal Cannula O2 Flow Rate (L/min) 15 L/min -- -- 6 L/min           Pertinent Labs/Diagnostic Test Results:     11/19 CXR - Moderate bilateral groundglass opacity due to Covid-19 11/19 ECG - NSR  11/19 ECG - Sinus bradycardia    11/21 Echo -   Left Ventricle Normal left ventricular cavity size, moderate approaching severe concentric left ventricular hypertrophy with prominent basal interventricular septum, normal left ventricular systolic function, normal regional wall motion  Ejection fraction is estimated as around 67%  Indeterminate diastolic function  Right Ventricle Normal right ventricular size and systolic function  Right ventricular systolic pressure could not be estimated due to inadequate tricuspid regurgitation profile  Left Atrium Normal left atrial cavity size  Intact interatrial septum  Right Atrium Normal right atrial cavity size     Aortic Valve Tricuspid aortic valve with moderate thickening and sclerosis and some focal calcification  Adequate cuspal separation  No aortic valve stenosis or regurgitation noted  Mitral Valve Mild mitral valve leaflet thickening and sclerosis, adequate leaflet mobility  No significant mitral valve regurgitation  Tricuspid Valve Normal tricuspid valve morphology and leaflet separation  Trace tricuspid valve regurgitation  Pulmonic Valve Grossly normal pulmonic valve structure  Normal leaflet mobility  No significant pulmonary valve regurgitation  Ascending Aorta Aortic root and proximal ascending aorta are normal in size on 2D imaging  IVC/SVC Inferior vena cava is  not well visualized  Pericardium No pericardial effusion  Pulmonary Artery The main pulmonary artery is normal in size  Results from last 7 days   Lab Units 11/17/21  1319   SARS-COV-2  Positive*     Results from last 7 days   Lab Units 11/22/21  0548 11/21/21  0452 11/20/21  0443 11/19/21  0652 11/19/21  0652   WBC Thousand/uL 17 07* 14 47* 17 84*   < > 19 70*   HEMOGLOBIN g/dL 12 8 12 6 12 6  --  13 0   HEMATOCRIT % 40 5 39 4 39 7  --  41 6   PLATELETS Thousands/uL 152 125* 113*   < > 106*   NEUTROS ABS Thousands/µL 14 96* 12 85*  --   --   --    BANDS PCT %  --   --   --   --  35*    < > = values in this interval not displayed           Results from last 7 days   Lab Units 11/22/21  0548 11/21/21  0452 11/20/21 0443 11/19/21  0652   SODIUM mmol/L 136 132* 130* 129*   POTASSIUM mmol/L 4 8 5 4* 5 5* 5 4*   CHLORIDE mmol/L 101 100 99* 98*   CO2 mmol/L 21 21 20* 22   ANION GAP mmol/L 14* 11 11 9   BUN mg/dL 62* 55* 40* 26*   CREATININE mg/dL 2 48* 2 46* 2 71* 2 13*   EGFR ml/min/1 73sq m 26 26 23 31   CALCIUM mg/dL 8 7 8 3 8 1* 8 2*   CALCIUM, IONIZED mmol/L  --   --   --  1 11*   MAGNESIUM mg/dL  --   --   --  1 6     Results from last 7 days   Lab Units 11/21/21  0452 11/20/21  0443 11/19/21  0652   AST U/L 98* 84* 80*   ALT U/L 41 42 52   ALK PHOS U/L 151* 117* 165*   TOTAL PROTEIN g/dL 6 0* 6 0* 6 6   ALBUMIN g/dL 2 0* 2 0* 2 5*   TOTAL BILIRUBIN mg/dL 0 65 0 57 0 78     Results from last 7 days   Lab Units 11/22/21  1114 11/22/21  0742 11/22/21  0353 11/22/21  0157 11/21/21  1957 11/21/21  1630 11/21/21  1105 11/21/21  0733 11/20/21  2133 11/20/21  1634 11/20/21  1105 11/20/21  0746   POC GLUCOSE mg/dl 285* 218* 280* 315* 396* 319* 354* 300* 312* 283* 195* 205*     Results from last 7 days   Lab Units 11/22/21  0548 11/21/21  0452 11/20/21  0443 11/19/21  0652   GLUCOSE RANDOM mg/dL 228* 285* 208* 123     Results from last 7 days   Lab Units 11/21/21  0452   OSMOLALITY, SERUM mmol/*     Results from last 7 days   Lab Units 11/20/21  0443 11/19/21  0652   CK TOTAL U/L 962* 1,386*   CK MB INDEX % <1 0 <1 0   CK MB ng/mL 4 6 3 0     Results from last 7 days   Lab Units 11/19/21  1112 11/19/21  0846 11/19/21  0652   HS TNI 0HR ng/L  --   --  42   HS TNI 2HR ng/L  --  37  --    HSTNI D2 ng/L  --  -5  --    HS TNI 4HR ng/L 36  --   --    HSTNI D4 ng/L -6  --   --      Results from last 7 days   Lab Units 11/19/21  0652   D-DIMER QUANTITATIVE ug/ml FEU 1 21*     Results from last 7 days   Lab Units 11/19/21  0652   PROTIME seconds 13 7   INR  1 08     Results from last 7 days   Lab Units 11/20/21  0443   TSH 3RD GENERATON uIU/mL 0 528     Results from last 7 days   Lab Units 11/22/21  0548 11/21/21  0452 11/20/21  0443 11/19/21  0652   PROCALCITONIN ng/ml 11 69* 21 52* 36 64* 24 66*     Results from last 7 days   Lab Units 11/19/21  0846 11/19/21  0652   LACTIC ACID mmol/L 2 3* 2 3*             Results from last 7 days   Lab Units 11/19/21  0652   NT-PRO BNP pg/mL 3,590*     Results from last 7 days   Lab Units 11/19/21  0652   FERRITIN ng/mL 567*             Results from last 7 days   Lab Units 11/21/21  0452 11/20/21  0443 11/19/21  0652   CRP mg/L 195 4* 215 3* 167 6*         Results from last 7 days   Lab Units 11/20/21  1336 11/20/21  1335   CLARITY UA  Clear  --    COLOR UA Light Yellow  --    SPEC GRAV UA  1 020  --    PH UA  5 5  --    GLUCOSE UA mg/dl Negative  --    KETONES UA mg/dl Trace*  --    BLOOD UA  Moderate*  --    PROTEIN UA mg/dl Trace*  --    NITRITE UA  Negative  --    BILIRUBIN UA  Negative  --    UROBILINOGEN UA E U /dl 0 2  --    LEUKOCYTES UA  Negative  --    WBC UA /hpf 1-2*  --    RBC UA /hpf 2-4  --    BACTERIA UA /hpf Innumerable*  --    EPITHELIAL CELLS WET PREP /hpf Occasional  --    SODIUM UR   --  35     Results from last 7 days   Lab Units 11/19/21  0652 11/19/21  0650   BLOOD CULTURE  Staphylococcus aureus*  Staphylococcus epidermidis* No Growth at 72 hrs     GRAM STAIN RESULT  Gram positive cocci in clusters*  --                ED Treatment:   Medication Administration from 11/19/2021 0627 to 11/19/2021 1008    Date/Time Order Dose Route Action   11/19/2021 0653 dexamethasone (DECADRON) injection 6 mg 6 mg Intravenous Given   11/19/2021 0724 ceftriaxone (ROCEPHIN) 1 g/50 mL in dextrose IVPB 1,000 mg Intravenous New Bag   11/19/2021 0724 doxycycline hyclate (VIBRAMYCIN) capsule 100 mg 100 mg Oral Given   11/19/2021 0752 acetaminophen (TYLENOL) tablet 650 mg 650 mg Oral Given   11/19/2021 0921 dexamethasone (DECADRON) injection 6 mg 6 mg Intravenous Given   11/19/2021 0934 heparin (porcine) subcutaneous injection 5,000 Units 5,000 Units Subcutaneous Given   11/19/2021 0925 sodium chloride 0 9 % bolus 250 mL 250 mL Intravenous New Bag   11/19/2021 0930 sodium chloride 0 9 % infusion 75 mL/hr Intravenous New Bag        Past Medical History:   Diagnosis Date    Chronic kidney disease     Coronary artery disease     Diabetes ([facility] )     Diabetes mellitus ([facility] )     GERD (gastroesophageal reflux disease)     Hypertension      Present on Admission:   Peripheral vascular disease, unspecified ([facility] )      Admitting Diagnosis: Dyspnea [R06 00]  SOB (shortness of breath) [R06 02]  Hypoxia [R09 02]  Sepsis ([facility] ) [A41 9]  Pneumonia due to COVID-19 virus [U07 1, J12 82]  COVID-19 [U07 1]  Age/Sex: 77 y o  male  Admission Orders:  Scheduled Medications:  amLODIPine, 5 mg, Oral, Daily  atorvastatin, 40 mg, Oral, HS  Baricitinib, 1 mg, Oral, Q24H  cefTRIAXone, 1,000 mg, Intravenous, Q24H  dexamethasone, 0 1 mg/kg, Intravenous, Q12H PRAVEENA  doxycycline hyclate, 100 mg, Oral, Q12H  guaiFENesin, 600 mg, Oral, Q12H PRAVEENA  heparin (porcine), 5,000 Units, Subcutaneous, Q8H [address]  insulin glargine, 10 Units, Subcutaneous, Once  insulin glargine, 30 Units, Subcutaneous, HS  insulin lispro, 1-5 Units, Subcutaneous, TID AC  insulin lispro, 10 Units, Subcutaneous, TID With Meals  isosorbide mononitrate, 60 mg, Oral, QAM  loratadine, 10 mg, Oral, Daily  melatonin, 6 mg, Oral, HS  pantoprazole, 40 mg, Oral, Early Morning  remdesivir, 100 mg, Intravenous, Q24H  sodium bicarbonate, 650 mg, Oral, BID after meals      Continuous IV Infusions:    IV 0 9% NSS @ 75 ml/hr from 11/19 to 1652 11/19     PRN Meds:  acetaminophen, 650 mg, Oral, Q6H PRN  hydrocodone-chlorpheniramine polistirex, 5 mL, Oral, Q12H PRN - x 1 11/19, x 2 11/20  hydrOXYzine HCL, 25 mg, Oral, Q6H PRN - x 4 11/20  oxyCODONE, 5 mg, Oral, Q4H PRN - x 1 11/19, x 2 11/20  phenol, 1 spray, Mouth/Throat, Q2H PRN - x 1 11/19  sodium chloride, 1 spray, Each Nare, Q1H PRN  tiZANidine, 4 mg, Oral, Q8H PRN    Isolation  SCDs  Self proning  Tele  Oxygen to keep sats > 88%  IP CONSULT TO PULMONOLOGY  IP CONSULT TO NEPHROLOGY    Network Utilization Review Department  ATTENTION: Please call with any questions or concerns to [phone] and carefully listen to the prompts so that you are directed to the right person  All voicemails are confidential   Liz Mcguire all requests for admission clinical reviews, approved or denied determinations and any other requests to dedicated fax number below belonging to the campus where the patient is receiving treatment   List of dedicated fax numbers for the Facilities:  FACILITY NAME UR FAX NUMBER   ADMISSION DENIALS (Administrative/Medical Necessity) [phone]   [address] (Maternity/NICU/Pediatrics) 261 [facility],[address] 40 125 [facility]  [phone]   [address] [facility] [place] [address] [id number] [place] [address] [address] [facility] [address] [facility] [phone]

## 2021-11-22 NOTE — PLAN OF CARE
Problem: PHYSICAL THERAPY ADULT  Goal: Performs mobility at highest level of function for planned discharge setting  See evaluation for individualized goals  Description: Treatment/Interventions: Functional transfer training,LE strengthening/ROM,Therapeutic exercise,Elevations,Endurance training,Patient/family training,Bed mobility,Equipment eval/education,Gait training,Continued evaluation,Compensatory technique education,Spoke to nursing,OT          See flowsheet documentation for full assessment, interventions and recommendations  11/22/2021 1330 by Kole Salmeron, PT  Note: Prognosis: Fair  Problem List: Decreased strength,Decreased endurance,Impaired balance,Decreased mobility,Obesity,Pain  Assessment: Hollace Ahumada is a 77 y o  [facility] [address] [email or web address] on 11/19/2021 for Acute hypoxemic respiratory failure due to COVID-19 [facility])  Pt  has a past medical history of  Diabetes mellitus ([facility] ), GERD (gastroesophageal reflux disease)  PT was consulted and pt was seen on 11/22/2021 for mobility assessment and d/c planning  Pt presents w contact and airborne precautions, multiple lines, pain  RN aware of upper central abdominal pain  Also present to assist w managing oxygen as pt desat on midflow only; placed on NRB durin session  Difficult to obtain social hx or PLOF d/t noise and communication barriers  Pt is currently functioning at a min A x1 level  Unsteady on his feet reaching out to furniture for support  Would benefit from trial of RW next session  Assessment of mobility limited at this time d/t medical complications including dizziness, O2 desat, pt not feeling well/ limited activity tolerance  Pt will benefit from continued skilled IP PT to address the above mentioned impairments  in order to maximize recovery and increase functional independence when completing mobility and ADLs  Currently PT recommendations for DME include are pending further assessment   At this time PT recommendations for d/c are STR given decline in function impacting safe dc home  Barriers to Discharge: Other (Comment) (unable to negotiate home environment (walking dist, steps))        PT Discharge Recommendation: Post acute rehabilitation services     PT - OK to Discharge: Yes    See flowsheet documentation for full assessment  11/22/2021 1330 by Kati Oconnor PT  Note: Prognosis: Fair  Problem List: Decreased strength,Decreased endurance,Impaired balance,Decreased mobility,Obesity,Pain  Assessment: Maria Elena Finnegan is a 77 y o  male admitted [address] on 11/19/2021 for Acute hypoxemic respiratory failure due to COVID-19 [facility])  Pt  has a past medical history of  Diabetes mellitus ([facility] ), GERD (gastroesophageal reflux disease)  PT was consulted and pt was seen on 11/22/2021 for mobility assessment and d/c planning  Pt presents w contact and airborne precautions, multiple lines, pain  RN aware of upper central abdominal pain  Also present to assist w managing oxygen as pt desat on midflow only; placed on NRB durin session  Difficult to obtain social hx or PLOF d/t noise and communication barriers  Pt is currently functioning at a min A x1 level  Unsteady on his feet reaching out to furniture for support  Would benefit from trial of RW next session  Assessment of mobility limited at this time d/t medical complications including dizziness, O2 desat, pt not feeling well/ limited activity tolerance  Pt will benefit from continued skilled IP PT to address the above mentioned impairments  in order to maximize recovery and increase functional independence when completing mobility and ADLs  Currently PT recommendations for DME include are pending further assessment  At this time PT recommendations for d/c are STR given decline in function impacting safe dc home  Barriers to Discharge:  Other (Comment) (unable to negotiate home environment (walking dist, steps))        PT Discharge Recommendation: Post acute rehabilitation services     PT - OK to Discharge: Yes    See flowsheet documentation for full assessment

## 2021-11-22 NOTE — ASSESSMENT & PLAN NOTE
One of 2 blood cultures growing Gram-positive cocci, staph coag neg  Likely contaminant, will continue to monitor on IV Rocephin

## 2021-11-23 PROBLEM — N17.9 AKI (ACUTE KIDNEY INJURY) (HCC): Status: RESOLVED | Noted: 2021-01-01 | Resolved: 2021-01-01

## 2021-11-23 NOTE — CONSULTS
Consultation - Infectious Disease   Terrence Torre 77 y o  male MRN: [id number]  Unit/Bed#: [place] [address] -[id number] Encounter: [id number]      IMPRESSION & RECOMMENDATIONS:     1  Staph aureus bacteremia   Present in 1/2 sets of admission blood cultures  Also growing staph epidermidis  It is difficult to call staph aureus a contaminant although this is a possibility  Given the high risk with inappropriate treatment, would treat as a true pathogen  Unclear source  Consider superimposed staph pneumonia in setting of sputum production and COVID 19 infection  TTE no vegetations  Has PiV in place, no central lines or intravascular devices     -follow up repeat blood cultures   -for now, continue vancomycin, dosing by pharmacy     2  Staph epidermidis bacteremia  1/2 sets admission blood cultures growing staph epidermidis  Likely contaminant    -follow up repeat cultures   -on vancomycin for staph aureus in blood culture    3  Sepsis-POA  Leukocytosis, tachypnea  Due to COVID 19 infection, staph aureus bacteremia  Consider possibility of superimposed bacterial pneumonia given productive cough and staph aureus in blood culture  Procalcitonin is elevated, but likely due to HUBER as trend corresponds to changes in creatinine and is now improving  Regardless, he has received 5 days of antibiotics for CAP  -stop ceftriaxone and doxycycline  Has received 5 day course   -vancomycin as above   -monitor fever curve, WBC count    4  Hypoxic respiratory failure  Secondary to COVID 19 infection  Remains on 15L midflow O2    -continue COVID specific therapies   -wean O2 as able   -supportive care per primary    5  COVID-19 infection  Patient unvaccinated  Positive 11/17  Started on moderate pathway    -s/p 5 days remdesivir   -continue steroids, baractinib   -continue O2, wean as able    6  Type 2 diabetes mellitus, hyperglycemia  HbA1c 8%  Also with steroid induced hyperglycemia     -glycemic management per primary team    7   HUBER on CKD3  Creatinine increased after admission due to poor PO intake, infection  Now improved to baseline   -nephrology following    I have discussed the above management plan in detail with the primary service and patient  ID will follow  I have performed an extensive review of the medical records in Epic including review of the notes, radiographs, and laboratory results     HISTORY OF PRESENT ILLNESS:  Reason for Consult: staph aureus bacteremia  HPI: Tyron Good is a 77 y o  man with a history of CKD3, obesity, HTN, CAD s/p CABG who presented to [facility] 11/19 with acute hypoxic respiratory failure secondary to COVID 19  The patient was unvaccinated  He previously tested positive for COVID 11/17  Many members of his family also had COVID  He presented to the hospital 11/19 with worsening productive cough and shortness of breath  He reported fever, body aches, and green/brown sputum production  On presentation, he was afebrile with a WBC count of 19 7  BNP was elevated to 3590  CXR showed bilateral groundglass opacities  He was hypoxic and placed on 6L NC, but O2 requirements increased to 15L midflow O2  He was started on doxycycline, Ceftriaxone, remdesivir, steroids, and baracitinib  Blood cultures from admission are growing staph aureus and staph epidermidis in 1/2 sets  He was started on vancomycin  ID is consulted for further evaluation  The patient remains on 15L midflow NC  He reports cough and hemoptysis, along with ongoing shortness of breath  Denies fever, chills, chest pain  No skin rashes  REVIEW OF SYSTEMS:  A complete review of systems is negative other than that noted in the HPI      PAST MEDICAL HISTORY:  Past Medical History:   Diagnosis Date    Chronic kidney disease     Coronary artery disease     [facility] )     Diabetes mellitus ([facility] )     GERD (gastroesophageal reflux disease)     Hypertension      Past Surgical History:   Procedure Laterality Date    CORONARY ARTERY BYPASS GRAFT  2008    MYRINGOTOMY W/ TUBES Bilateral 2019    [facility] - Dr Kylah Tinoco - T tubes       FAMILY HISTORY:  Non-contributory    SOCIAL HISTORY:  Social History   Social History     Substance and Sexual Activity   Alcohol Use No     Social History     Substance and Sexual Activity   Drug Use No     Social History     Tobacco Use   Smoking Status Never Smoker   Smokeless Tobacco Never Used       ALLERGIES:  Allergies   Allergen Reactions    Calcium Itching       MEDICATIONS:  All current active medications have been reviewed  PHYSICAL EXAM:  Temp:  [97 7 °F (36 5 °C)-98 °F (36 7 °C)] 98 °F (36 7 °C)  HR:  [] 80  Resp:  [16-24] 16  BP: (120-178)/(55-89) 132/61  SpO2:  [89 %-96 %] 96 %  Temp (24hrs), Av 9 °F (36 6 °C), Min:97 7 °F (36 5 °C), Max:98 °F (36 7 °C)  Current: Temperature: 98 °F (36 7 °C)    Intake/Output Summary (Last 24 hours) at 2021 1712  Last data filed at 2021 0601  Gross per 24 hour   Intake 440 ml   Output 1625 ml   Net -1185 ml       General Appearance: Tachypnea but appears non toxic   Head:  Normocephalic, without obvious abnormality, atraumatic   Eyes:  Conjunctiva pink and sclera anicteric, both eyes   Nose: Nares normal, mucosa normal, no drainage   Throat: Oropharynx moist without lesions   Neck: Supple, symmetrical, no adenopathy, no tenderness/mass/nodules   Back:   Symmetric, no curvature, ROM normal   Lungs:   Decreased breath sounds bilaterally, +tachypnea   Chest Wall:  No tenderness or deformity   Heart:  RRR; no murmur, rub or gallop   Abdomen:   Soft, non-tender, non-distended, positive bowel sounds    Extremities: No cyanosis, clubbing or edema   Skin: No rashes or lesions  No draining wounds noted  Lymph nodes: Cervical, supraclavicular nodes normal   Neurologic: Alert and oriented times 3, extremity strength 5/5 and symmetric       LABS, IMAGING, & OTHER STUDIES:  Lab Results:  I have personally reviewed pertinent labs    Results from last 7 days   Lab Units 11/23/21  0446 11/22/21  0548 11/21/21  0452   WBC Thousand/uL 11 93* 17 07* 14 47*   HEMOGLOBIN g/dL 12 3 12 8 12 6   PLATELETS Thousands/uL 154 152 125*     Results from last 7 days   Lab Units 11/23/21  0446 11/22/21  0548 11/22/21  0548 11/21/21  0452 11/21/21  0452 11/20/21  0443 11/20/21  0443 11/19/21  0652 11/19/21  0652   SODIUM mmol/L 134*  --  136  --  132*   < > 130*   < > 129*   POTASSIUM mmol/L 5 1   < > 4 8   < > 5 4*   < > 5 5*   < > 5 4*   CHLORIDE mmol/L 100   < > 101   < > 100   < > 99*   < > 98*   CO2 mmol/L 22   < > 21   < > 21   < > 20*   < > 22   BUN mg/dL 53*   < > 62*   < > 55*   < > 40*   < > 26*   CREATININE mg/dL 2 12*   < > 2 48*   < > 2 46*   < > 2 71*   < > 2 13*   EGFR ml/min/1 73sq m 31   < > 26   < > 26   < > 23   < > 31   CALCIUM mg/dL 8 2*   < > 8 7   < > 8 3   < > 8 1*   < > 8 2*   AST U/L  --   --   --   --  98*  --  84*  --  80*   ALT U/L  --   --   --   --  41   < > 42   < > 52   ALK PHOS U/L  --   --   --   --  151*   < > 117*   < > 165*    < > = values in this interval not displayed  Results from last 7 days   Lab Units 11/22/21  1801 11/22/21  1800 11/19/21  0652 11/19/21  0650   BLOOD CULTURE  Received in Microbiology Lab  Culture in Progress  Received in Microbiology Lab  Culture in Progress  Staphylococcus aureus*  Staphylococcus epidermidis* No Growth After 4 Days     GRAM STAIN RESULT   --   --  Gram positive cocci in clusters*  --      Results from last 7 days   Lab Units 11/22/21  0548 11/21/21  0452 11/20/21  0443 11/19/21  0652   PROCALCITONIN ng/ml 11 69* 21 52* 36 64* 24 66*     Results from last 7 days   Lab Units 11/21/21  0452 11/20/21  0443 11/19/21  0652   CRP mg/L 195 4* 215 3* 167 6*     Results from last 7 days   Lab Units 11/19/21  0652   FERRITIN ng/mL 567*     Results from last 7 days   Lab Units 11/19/21  0652   D-DIMER QUANTITATIVE ug/ml FEU 1 21*       Imaging Studies:   I have personally reviewed pertinent imaging study reports and images in PACS  CXR 11/19/21:  Bilateral groundglass opacities     Other Studies:   I have personally reviewed pertinent reports

## 2021-11-23 NOTE — PROGRESS NOTES
Vancomycin Assessment    Christian Werner is a 77 y o  male who is currently receiving vancomycin 1500mg IV every 24 hours for bacteremia    Relevant clinical data and objective history reviewed:  Creatinine   Date Value Ref Range Status   11/23/2021 2 12 (H) 0 60 - 1 30 mg/dL Final     Comment:     Standardized to IDMS reference method   11/22/2021 2 48 (H) 0 60 - 1 30 mg/dL Final     Comment:     Standardized to IDMS reference method   11/21/2021 2 46 (H) 0 60 - 1 30 mg/dL Final     Comment:     Standardized to IDMS reference method     /61   Pulse 80   Temp 98 °F (36 7 °C)   Resp 16   Ht 5' 4" (1 626 m)   Wt 103 kg (227 lb 11 8 oz)   SpO2 96%   BMI 39 09 kg/m²   I/O last 3 completed shifts: In: 440 [P O :440]  Out: 1800 [Urine:1800]  Lab Results   Component Value Date/Time    BUN 53 (H) 11/23/2021 04:46 AM    BUN 29 (H) 06/21/2018 11:02 AM    WBC 11 93 (H) 11/23/2021 04:46 AM    WBC 5 4 06/21/2018 11:02 AM    HGB 12 3 11/23/2021 04:46 AM    HGB 13 8 06/21/2018 11:02 AM    HCT 35 1 (L) 11/23/2021 04:46 AM    HCT 42 0 06/21/2018 11:02 AM    MCV 76 (L) 11/23/2021 04:46 AM    MCV 78 (L) 06/21/2018 11:02 AM     11/23/2021 04:46 AM     (L) 06/21/2018 11:02 AM     Temp Readings from Last 3 Encounters:   11/23/21 98 °F (36 7 °C)   10/15/20 98 2 °F (36 8 °C)   10/01/20 97 5 °F (36 4 °C)     Vancomycin Days of Therapy: 1    Assessment/Plan  The patient is currently on vancomycin utilizing scheduled dosing based on adjusted body weight (due to obesity)  The patient is currently receiving vancomycin 1500mg IV every 24 hours and is clinically appropriate and dose will be continued  Pharmacy will also follow closely for s/sx of nephrotoxicity, infusion reactions, and appropriateness of therapy  BMP and CBC will be ordered per protocol  Plan for trough as patient approaches steady state, prior to the 4th  dose at approximately [phone] on 11/26/21    Due to infection severity, will target a trough of 15-20   Pharmacy will continue to follow the patients culture results and clinical progress daily      Kg Giordano, Pharmacist

## 2021-11-23 NOTE — ASSESSMENT & PLAN NOTE
One of 2 blood cultures growing Gram-positive cocci, Staph aureus & Staph epidermis  Repeat blood cultures drawn yesterday 11/22/21-pending  Will have ID evaluate

## 2021-11-23 NOTE — ASSESSMENT & PLAN NOTE
39-year-old male, history of type 2 diabetes, CAD, hypertension presented with shortness of breath and cough  Patient found to be septic, tachypnea, leukocytosis  COVID positive 11/17, unvaccinated  Checks x-ray shows multifocal pneumonia  IV fluids, IV antibiotics Rocephin, procalcitonin significantly elevated, concern for superimposed bacterial infection  Plan for 7 days of rocephin - on day 5  Remdesivir, IV steroids, baricitnib  Encourage self prone in, incentive spirometry, ambulation as able  On NR and NC at 15 L  Pulmonary following

## 2021-11-23 NOTE — PLAN OF CARE
Problem: PAIN - ADULT  Goal: Verbalizes/displays adequate comfort level or baseline comfort level  Description: Interventions:  - Encourage patient to monitor pain and request assistance  - Assess pain using appropriate pain scale  - Administer analgesics based on type and severity of pain and evaluate response  - Implement non-pharmacological measures as appropriate and evaluate response  - Consider cultural and social influences on pain and pain management  - Notify physician/advanced practitioner if interventions unsuccessful or patient reports new pain  Outcome: Progressing     Problem: INFECTION - ADULT  Goal: Absence or prevention of progression during hospitalization  Description: INTERVENTIONS:  - Assess and monitor for signs and symptoms of infection  - Monitor lab/diagnostic results  - Monitor all insertion sites, i e  indwelling lines, tubes, and drains  - Monitor endotracheal if appropriate and nasal secretions for changes in amount and color  - [place] appropriate cooling/warming therapies per order  - Administer medications as ordered  - Instruct and encourage patient and family to use good hand hygiene technique  - Identify and instruct in appropriate isolation precautions for identified infection/condition  Outcome: Progressing     Problem: SAFETY ADULT  Goal: Patient will remain free of falls  Description: INTERVENTIONS:  - Educate patient/family on patient safety including physical limitations  - Instruct patient to call for assistance with activity   - Consult OT/PT to assist with strengthening/mobility   - Keep Call bell within reach  - Keep bed low and locked with side rails adjusted as appropriate  - Keep care items and personal belongings within reach  - Initiate and maintain comfort rounds  - Make Fall Risk Sign visible to staff  - Offer Toileting every 2 Hours, in advance of need  - Initiate/Maintain alarm  - Obtain necessary fall risk management equipment  - Apply yellow socks and bracelet for high fall risk patients  - Consider moving patient to room near nurses station  Outcome: Progressing  Goal: Maintain or return to baseline ADL function  Description: INTERVENTIONS:  -  Assess patient's ability to carry out ADLs; assess patient's baseline for ADL function and identify physical deficits which impact ability to perform ADLs (bathing, care of mouth/teeth, toileting, grooming, dressing, etc )  - Assess/evaluate cause of self-care deficits   - Assess range of motion  - Assess patient's mobility; develop plan if impaired  - Assess patient's need for assistive devices and provide as appropriate  - Encourage maximum independence but intervene and supervise when necessary  - Involve family in performance of ADLs  - Assess for home care needs following discharge   - Consider OT consult to assist with ADL evaluation and planning for discharge  - Provide patient education as appropriate  Outcome: Progressing  Goal: Maintains/Returns to pre admission functional level  Description: INTERVENTIONS:  - Perform BMAT or MOVE assessment daily    - Set and communicate daily mobility goal to care team and patient/family/caregiver  - Collaborate with rehabilitation services on mobility goals if consulted  - Perform Range of Motion 3 times a day  - Reposition patient every 2 hours    - Dangle patient 3 times a day  - Stand patient 3 times a day  - Ambulate patient 3 times a day  - Out of bed to chair 3 times a day   - Out of bed for meals 3 times a day  - Out of bed for toileting  - Record patient progress and toleration of activity level   Outcome: Progressing     Problem: RESPIRATORY - ADULT  Goal: Achieves optimal ventilation and oxygenation  Description: INTERVENTIONS:  - Assess for changes in respiratory status  - Assess for changes in mentation and behavior  - Position to facilitate oxygenation and minimize respiratory effort  - Oxygen administered by appropriate delivery if ordered  - Initiate smoking cessation education as indicated  - Encourage broncho-pulmonary hygiene including cough, deep breathe, Incentive Spirometry  - Assess the need for suctioning and aspirate as needed  - Assess and instruct to report SOB or any respiratory difficulty  - Respiratory Therapy support as indicated  Outcome: Progressing     Problem: Potential for Falls  Goal: Patient will remain free of falls  Description: INTERVENTIONS:  - Educate patient/family on patient safety including physical limitations  - Instruct patient to call for assistance with activity   - Consult OT/PT to assist with strengthening/mobility   - Keep Call bell within reach  - Keep bed low and locked with side rails adjusted as appropriate  - Keep care items and personal belongings within reach  - Initiate and maintain comfort rounds  - Make Fall Risk Sign visible to staff  - Offer Toileting every 2 Hours, in advance of need  - Initiate/Maintain alarm  - Obtain necessary fall risk management equipment  - Apply yellow socks and bracelet for high fall risk patients  - Consider moving patient to room near nurses station  Outcome: Progressing     Problem: CARDIOVASCULAR - ADULT  Goal: Maintains optimal cardiac output and hemodynamic stability  Description: INTERVENTIONS:  - Monitor I/O, vital signs and rhythm  - Monitor for S/S and trends of decreased cardiac output  - Administer and titrate ordered vasoactive medications to optimize hemodynamic stability  - Assess quality of pulses, skin color and temperature  - Assess for signs of decreased coronary artery perfusion  - Instruct patient to report change in severity of symptoms  Outcome: Progressing     Problem: GENITOURINARY - ADULT  Goal: Maintains or returns to baseline urinary function  Description: INTERVENTIONS:  - Assess urinary function  - Encourage oral fluids to ensure adequate hydration if ordered  - Administer IV fluids as ordered to ensure adequate hydration  - Administer ordered medications as needed  - Offer frequent toileting  - Follow urinary retention protocol if ordered  Outcome: Progressing     Problem: METABOLIC, FLUID AND ELECTROLYTES - ADULT  Goal: Electrolytes maintained within normal limits  Description: INTERVENTIONS:  - Monitor labs and assess patient for signs and symptoms of electrolyte imbalances  - Administer electrolyte replacement as ordered  - Monitor response to electrolyte replacements, including repeat lab results as appropriate  - Instruct patient on fluid and nutrition as appropriate  Outcome: Progressing  Goal: Fluid balance maintained  Description: INTERVENTIONS:  - Monitor labs   - Monitor I/O and WT  - Instruct patient on fluid and nutrition as appropriate  - Assess for signs & symptoms of volume excess or deficit  Outcome: Progressing  Goal: Glucose maintained within target range  Description: INTERVENTIONS:  - Monitor Blood Glucose as ordered  - Assess for signs and symptoms of hyperglycemia and hypoglycemia  - Administer ordered medications to maintain glucose within target range  - Assess nutritional intake and initiate nutrition service referral as needed  Outcome: Progressing     Problem: MUSCULOSKELETAL - ADULT  Goal: Maintain or return mobility to safest level of function  Description: INTERVENTIONS:  - Assess patient's ability to carry out ADLs; assess patient's baseline for ADL function and identify physical deficits which impact ability to perform ADLs (bathing, care of mouth/teeth, toileting, grooming, dressing, etc )  - Assess/evaluate cause of self-care deficits   - Assess range of motion  - Assess patient's mobility  - Assess patient's need for assistive devices and provide as appropriate  - Encourage maximum independence but intervene and supervise when necessary  - Involve family in performance of ADLs  - Assess for home care needs following discharge   - Consider OT consult to assist with ADL evaluation and planning for discharge  - Provide patient education as appropriate  Outcome: Progressing     Problem: Prexisting or High Potential for Compromised Skin Integrity  Goal: Skin integrity is maintained or improved  Description: INTERVENTIONS:  - Identify patients at risk for skin breakdown  - Assess and monitor skin integrity  - Assess and monitor nutrition and hydration status  - Monitor labs   - Assess for incontinence   - Turn and reposition patient  - Assist with mobility/ambulation  - Relieve pressure over bony prominences  - Avoid friction and shearing  - Provide appropriate hygiene as needed including keeping skin clean and dry  - Evaluate need for skin moisturizer/barrier cream  - Collaborate with interdisciplinary team   - Patient/family teaching  - Consider wound care consult   Outcome: Progressing     Problem: MOBILITY - ADULT  Goal: Maintain or return to baseline ADL function  Description: INTERVENTIONS:  -  Assess patient's ability to carry out ADLs; assess patient's baseline for ADL function and identify physical deficits which impact ability to perform ADLs (bathing, care of mouth/teeth, toileting, grooming, dressing, etc )  - Assess/evaluate cause of self-care deficits   - Assess range of motion  - Assess patient's mobility; develop plan if impaired  - Assess patient's need for assistive devices and provide as appropriate  - Encourage maximum independence but intervene and supervise when necessary  - Involve family in performance of ADLs  - Assess for home care needs following discharge   - Consider OT consult to assist with ADL evaluation and planning for discharge  - Provide patient education as appropriate  Outcome: Progressing  Goal: Maintains/Returns to pre admission functional level  Description: INTERVENTIONS:  - Perform BMAT or MOVE assessment daily    - Set and communicate daily mobility goal to care team and patient/family/caregiver     - Collaborate with rehabilitation services on mobility goals if consulted  - Perform Range of Motion 3 times a day   - Reposition patient every 2 hours    - Dangle patient 3 times a day  - Stand patient 3 times a day  - Ambulate patient 3 times a day  - Out of bed to chair 3 times a day   - Out of bed for meals 3 times a day  - Out of bed for toileting  - Record patient progress and toleration of activity level   Outcome: Progressing

## 2021-11-23 NOTE — ASSESSMENT & PLAN NOTE
Results from last 7 days   Lab Units 11/23/21  0446 11/22/21  0548 11/21/21  0452 11/20/21  0443   CREATININE mg/dL 2 12* 2 48* 2 46* 2 71*   Previous lab shows patient's baseline creatinine roughly around 1 8-2  Likely prerenal in setting of COVID, poor p o   Intake, mildly elevated CK  Nephrology following- renal function continues to improve

## 2021-11-23 NOTE — PLAN OF CARE
Problem: PAIN - ADULT  Goal: Verbalizes/displays adequate comfort level or baseline comfort level  Description: Interventions:  - Encourage patient to monitor pain and request assistance  - Assess pain using appropriate pain scale  - Administer analgesics based on type and severity of pain and evaluate response  - Implement non-pharmacological measures as appropriate and evaluate response  - Consider cultural and social influences on pain and pain management  - Notify physician/advanced practitioner if interventions unsuccessful or patient reports new pain  Outcome: Progressing     Problem: INFECTION - ADULT  Goal: Absence or prevention of progression during hospitalization  Description: INTERVENTIONS:  - Assess and monitor for signs and symptoms of infection  - Monitor lab/diagnostic results  - Monitor all insertion sites, i e  indwelling lines, tubes, and drains  - Monitor endotracheal if appropriate and nasal secretions for changes in amount and color  - [place] appropriate cooling/warming therapies per order  - Administer medications as ordered  - Instruct and encourage patient and family to use good hand hygiene technique  - Identify and instruct in appropriate isolation precautions for identified infection/condition  Outcome: Progressing     Problem: SAFETY ADULT  Goal: Patient will remain free of falls  Description: INTERVENTIONS:  - Educate patient/family on patient safety including physical limitations  - Instruct patient to call for assistance with activity   - Consult OT/PT to assist with strengthening/mobility   - Keep Call bell within reach  - Keep bed low and locked with side rails adjusted as appropriate  - Keep care items and personal belongings within reach  - Initiate and maintain comfort rounds  - Make Fall Risk Sign visible to staff  - Offer Toileting every  Hours, in advance of need  - Initiate/Maintain alarm  - Obtain necessary fall risk management equipment:  - Apply yellow socks and bracelet for high fall risk patients  - Consider moving patient to room near nurses station  Outcome: Progressing  Goal: Maintain or return to baseline ADL function  Description: INTERVENTIONS:  -  Assess patient's ability to carry out ADLs; assess patient's baseline for ADL function and identify physical deficits which impact ability to perform ADLs (bathing, care of mouth/teeth, toileting, grooming, dressing, etc )  - Assess/evaluate cause of self-care deficits   - Assess range of motion  - Assess patient's mobility; develop plan if impaired  - Assess patient's need for assistive devices and provide as appropriate  - Encourage maximum independence but intervene and supervise when necessary  - Involve family in performance of ADLs  - Assess for home care needs following discharge   - Consider OT consult to assist with ADL evaluation and planning for discharge  - Provide patient education as appropriate  Outcome: Progressing  Goal: Maintains/Returns to pre admission functional level  Description: INTERVENTIONS:  - Perform BMAT or MOVE assessment daily    - Set and communicate daily mobility goal to care team and  Problem: CARDIOVASCULAR - ADULT  Goal: Maintains optimal cardiac output and hemodynamic stability  Description: INTERVENTIONS:  - Monitor I/O, vital signs and rhythm  - Monitor for S/S and trends of decreased cardiac output  - Administer and titrate ordered vasoactive medications to optimize hemodynamic stability  - Assess quality of pulses, skin color and temperature  - Assess for signs of decreased coronary artery perfusion  - Instruct patient to report change in severity of symptoms  Outcome: Progressing     Problem: GENITOURINARY - ADULT  Goal: Maintains or returns to baseline urinary function  Description: INTERVENTIONS:  - Assess urinary function  - Encourage oral fluids to ensure adequate hydration if ordered  - Administer IV fluids as ordered to ensure adequate hydration  - Administer ordered medications as needed  - Offer frequent toileting  - Follow urinary retention protocol if ordered  Outcome: Progressing     - Record patient progress and toleration of activity level   Outcome: Progressing

## 2021-11-23 NOTE — PROGRESS NOTES
[address] [place] [address]  Progress Note - John Ibarra 1955, 77 y o  male MRN: [id number]  Unit/Bed#: [facility] 2 [facility] 87 216-01 Encounter: [id number]  Primary Care Provider: Mannie Sky MD   Date and time admitted to hospital: 11/19/2021  6:27 AM    * Acute hypoxemic respiratory failure due to COVID-19 [facility])  Assessment & Plan  38-year-old male, history of type 2 diabetes, CAD, hypertension presented with shortness of breath and cough  Patient found to be septic, tachypnea, leukocytosis  COVID positive 11/17, unvaccinated  Checks x-ray shows multifocal pneumonia  IV fluids, IV antibiotics Rocephin, procalcitonin significantly elevated, concern for superimposed bacterial infection  Plan for 7 days of rocephin - on day 5  Remdesivir, IV steroids, baricitnib  Encourage self prone in, incentive spirometry, ambulation as able  On NR and NC at 15 L  Pulmonary following    Gram-positive bacteremia  Assessment & Plan  One of 2 blood cultures growing Gram-positive cocci, Staph aureus & Staph epidermis  Repeat blood cultures drawn yesterday 11/22/21-pending  Will have ID evaluate    Sepsis due to COVID-19 [facility])  Assessment & Plan  Leukocytosis and tachypneic on admission in setting of COVID pneumonia  Treatment as above    HUBER (acute kidney injury) [facility])  Assessment & Plan  Results from last 7 days   Lab Units 11/23/21  0446 11/22/21  0548 11/21/21  0452 11/20/21  0443   CREATININE mg/dL 2 12* 2 48* 2 46* 2 71*   Previous lab shows patient's baseline creatinine roughly around 1 8-2  Likely prerenal in setting of COVID, poor p o   Intake, mildly elevated CK  Nephrology following- renal function continues to improve    Essential hypertension  Assessment & Plan  Home regimen amlodipine 5 mg daily  Lasix currently on hold given HUBER    Hx of CABG  Assessment & Plan  History of CAD status post CABG and stents  Not on aspirin or beta blockers  Continue Imdur and statin    Type 2 diabetes mellitus, without long-term current use of insulin ([facility] [address] )  Assessment & Plan  Lab Results   Component Value Date    HGBA1C 8 0 (H) 2021   Improving appetite, hyperglycemia secondary to steroids  Increased Lantus to 30 units hs, humalog 10 units with meals  Still with hyperglycemia in the 400s today    - increase to humalog 15 units TID   - increase to lantus 35 units HS    Hyperkalemia-resolved as of 2021  Assessment & Plan  Hyperkalemia in the setting of HUBER  Now resolved        VTE Pharmacologic Prophylaxis:   Pharmacologic: Heparin  Mechanical VTE Prophylaxis in Place: Yes    Discharge Plan: With need for continued inpatient stay for COVID pneumonia and oxygen requirements    Discussions with Specialists or Other Care Team Provider:  Nursing    Education and Discussions with Family / Patient:  Patient    Time Spent for Care: 30 minutes  More than 50% of total time spent on counseling and coordination of care as described above  Current Length of Stay: 4 day(s)  Current Patient Status: Inpatient   Code Status: Level 1 - Full Code    Subjective:   Resting in bed  Feels okay but does complain of a headache and some trouble breathing  Spoke with nursing  No acute events overnight  Eating and drinking a little bit better now  Objective:     Vitals:   Temp (24hrs), Av 8 °F (36 6 °C), Min:97 7 °F (36 5 °C), Max:97 9 °F (36 6 °C)    Temp:  [97 7 °F (36 5 °C)-97 9 °F (36 6 °C)] 97 9 °F (36 6 °C)  HR:  [] 92  Resp:  [16-24] 18  BP: (120-178)/(53-89) 151/55  SpO2:  [89 %-96 %] 96 %  Body mass index is 39 09 kg/m²  Input and Output Summary (last 24 hours): Intake/Output Summary (Last 24 hours) at 2021 1504  Last data filed at 2021 0601  Gross per 24 hour   Intake 440 ml   Output 1625 ml   Net -1185 ml       Physical Exam:     Physical Exam  Vitals and nursing note reviewed  Constitutional:       General: He is not in acute distress  Appearance: Normal appearance  He is normal weight   He is not ill-appearing, toxic-appearing or diaphoretic  HENT:      Head: Normocephalic and atraumatic  Eyes:      General: No scleral icterus  Cardiovascular:      Rate and Rhythm: Normal rate and regular rhythm  Pulmonary:      Effort: Pulmonary effort is normal  No respiratory distress  Breath sounds: Normal breath sounds  No stridor  No wheezing or rhonchi  Comments: On 15 L midflow  Abdominal:      General: Bowel sounds are normal  There is no distension  Palpations: Abdomen is soft  There is no mass  Tenderness: There is no abdominal tenderness  Hernia: No hernia is present  Musculoskeletal:         General: No swelling  Cervical back: Neck supple  Skin:     General: Skin is warm and dry  Neurological:      Mental Status: He is alert and oriented to person, place, and time  Mental status is at baseline  Psychiatric:         Mood and Affect: Mood normal          Behavior: Behavior normal          Additional Data:     Labs:    Results from last 7 days   Lab Units 11/23/21 0446 11/22/21  0548 11/19/21  0652   WBC Thousand/uL 11 93* 17 07*   < >   HEMOGLOBIN g/dL 12 3 12 8   < >   HEMATOCRIT % 35 1* 40 5   < >   PLATELETS Thousands/uL 154 152   < >   NEUTROS PCT %  --  88*  --    LYMPHS PCT %  --  7*  --    LYMPHO PCT % 3*  --    < >   MONOS PCT %  --  4  --    MONO PCT % 5  --    < >   EOS PCT % 0 0   < >    < > = values in this interval not displayed  Results from last 7 days   Lab Units 11/23/21 0446 11/22/21  0548 11/21/21  0452   POTASSIUM mmol/L 5 1   < > 5 4*   CHLORIDE mmol/L 100   < > 100   CO2 mmol/L 22   < > 21   BUN mg/dL 53*   < > 55*   CREATININE mg/dL 2 12*   < > 2 46*   CALCIUM mg/dL 8 2*   < > 8 3   ALK PHOS U/L  --   --  151*   ALT U/L  --   --  41   AST U/L  --   --  98*    < > = values in this interval not displayed  Results from last 7 days   Lab Units 11/19/21  0652   INR  1 08       * I Have Reviewed All Lab Data Listed Above    * Additional Pertinent Lab Tests Reviewed: Luis Antonio [facility] Admission Reviewed    Imaging:    Imaging Reports Reviewed Today Include:   Imaging Personally Reviewed by Myself Includes:      Recent Cultures (last 7 days):     Results from last 7 days   Lab Units 11/22/21  1801 11/22/21  1800 11/19/21  0652 11/19/21  0650   BLOOD CULTURE  Received in Microbiology Lab  Culture in Progress  Received in Microbiology Lab  Culture in Progress  Staphylococcus aureus*  Staphylococcus epidermidis* No Growth After 4 Days     GRAM STAIN RESULT   --   --  Gram positive cocci in clusters*  --        Last 24 Hours Medication List:   Current Facility-Administered Medications   Medication Dose Route Frequency Provider Last Rate    acetaminophen  650 mg Oral Q6H PRN Maria Victoria Holt MD      amLODIPine  5 mg Oral Daily CIELO Moore      atorvastatin  40 mg Oral HS Yfn Irby MD      Baricitinib  1 mg Oral Q24H Maria Victoria Holt MD      cefTRIAXone  1,000 mg Intravenous Q24H Maria Victoria Holt MD 1,000 mg (11/23/21 0875)    dexamethasone  0 1 mg/kg Intravenous Q12H [facility] CIELO Day 11 1 mg (11/23/21 5507)    doxycycline hyclate  100 mg Oral Q12H Maria Victoria Holt MD      guaiFENesin  600 mg Oral Q12H [facility] Yfn Irby MD      heparin (porcine)  5,000 Units Subcutaneous [facility] Morena Irby MD      hydrocodone-chlorpheniramine polistirex  5 mL Oral Q12H PRN Morena Irby MD      hydrOXYzine HCL  25 mg Oral Q6H PRN CIELO Be      insulin glargine  35 Units Subcutaneous HS Mable Lieberman PA-C      insulin lispro  1-5 Units Subcutaneous TID AC Yfn Irby MD      insulin lispro  15 Units Subcutaneous TID With Meals [address]      isosorbide mononitrate  60 mg Oral QAM CIELO Moore      loratadine  10 mg Oral Daily Maria Victoria Holt MD      melatonin  6 mg Oral HS CIELO Be      oxyCODONE  5 mg Oral Q4H PRN Maria Victoria Holt MD      pantoprazole  40 mg Oral Early Morning Wesly Sherman MD      phenol  1 spray Mouth/Throat Q2H PRN Wesly Sherman MD      sodium bicarbonate  650 mg Oral BID after meals CIELO Clark      sodium chloride  1 spray Each Nare Q1H PRN Lari Leak Severo Ing, MD      tiZANidine  4 mg Oral Q8H PRN Wesly Sherman MD          Today, Patient Was Seen By: Johnna Hameed PA-C    ** Please Note: This note has been constructed using a voice recognition system   **

## 2021-11-23 NOTE — ASSESSMENT & PLAN NOTE
Lab Results   Component Value Date    HGBA1C 8 0 (H) 11/04/2021   Improving appetite, hyperglycemia secondary to steroids  Increased Lantus to 30 units hs, humalog 10 units with meals  Still with hyperglycemia in the 400s today    - increase to humalog 15 units TID   - increase to lantus 35 units HS

## 2021-11-23 NOTE — PROGRESS NOTES
NEPHROLOGY PROGRESS NOTE   Nevin Beck 77 y o  male MRN: [id number]  Unit/Bed#: [place] [facility] 87 216-01 Encounter: [id number]  Reason for Consult: HUBER    Parts of the exam were done by primary/nursing service and were discussed with our team due to infection control prevention measures related to COVID 19  The patient was viewed through the glass window  ASSESSMENT/PLAN:  Acute kidney injury:  Suspected prerenal etiology with likely septic ATN in the setting of sepsis/COVID   -baseline creatinine 1 8-2 1   -presents with creatinine of 2 13   -creatinine improving to 2 12    -follows with [facility]  -CPK level mildly elevated  Will repeat in am    -most recent UA:  Trace ketones, moderate blood, trace protein, 2-4 RBCs, 1-2 WBCs, innumerable bacteria  0-1 fine granular cast   -CT scan shows unremarkable kidneys   -continue to hold diuretics, may give as needed   -avoid nephrotoxins, hypotension, IV contrast   -I/O  Good UOP  Hypertension:  Blood pressure fluctuating, above goal last evening, improved this morning   -home medications: Norvasc 5 mg po daily  -currently on Norvasc 5 mg daily, Imdur 60 mg daily   -may increase Norvasc to 10 mg po daily if BP remains above goal   -Lasix remains on hold  -recommend avoiding hypotension or high fluctuations in blood pressure   -recommend holding parameters antihypertensive for systolic blood pressure less than 130 mmHg  Metabolic acidosis: (stable)  -currently on sodium bicarbonate tablets 350 mg po BID  Hyperkalemia: (resovled)  -received Lasix on 11/19/2021   -continue low-potassium diet   -elevated glucose, recommend glycemic control  Gram-positive bacteremia:  Suspected contaminant   -currently on IV Rocephin  Acute on chronic CHF:  EF of 67%    -continue to hold diuretics, give as needed   -home diuretic:  Lasix 20 mg daily   -continue daily weights, fluid restriction, I/O     COVID-19 infection/pneumonia/sepsis:  Further care per primary care team     Other:  Diabetes, CABG, coronary stent    Disposition:  Requiring additional stay due to medical needs    SUBJECTIVE:  He reports difficulty with breathing  He is on O2  He denies chest  He reports eating and urinating without difficulty  Spoke with patient on phone and viewed through window       OBJECTIVE:  Current Weight: Weight - Scale: 103 kg (227 lb 11 8 oz)  Vitals:    11/23/21 0323 11/23/21 0600 11/23/21 0723 11/23/21 1139   BP: 170/74  120/89 151/55   BP Location: Right arm      Pulse: (!) 108  92    Resp: (!) 24  16 18   Temp: 97 9 °F (36 6 °C)  97 9 °F (36 6 °C) 97 9 °F (36 6 °C)   TempSrc: Oral      SpO2: 92%  96%    Weight:  103 kg (227 lb 11 8 oz)     Height:           Intake/Output Summary (Last 24 hours) at 11/23/2021 1150  Last data filed at 11/23/2021 0601  Gross per 24 hour   Intake 440 ml   Output 1625 ml   Net -1185 ml     General: NAD  Skin: warm, dry, intact, no rash  HEENT: Moist mucous membranes, sclera anicteric, normocephalic, atraumatic  Neck: No apparent JVD appreciated  Chest: lung sounds rhonchi B/L, on O2  CVS:Regular rate and rhythm, no murmer   Abdomen: Soft, round, non-tender, +BS  Extremities: No B/L LE edema present  Neuro: alert and oriented  Psych: appropriate mood and affect     Medications:    Current Facility-Administered Medications:     acetaminophen (TYLENOL) tablet 650 mg, 650 mg, Oral, Q6H PRN, Denys Griffith MD, 650 mg at 11/23/21 0550    amLODIPine (NORVASC) tablet 5 mg, 5 mg, Oral, Daily, CIELO Anne, 5 mg at 11/23/21 0807    atorvastatin (LIPITOR) tablet 40 mg, 40 mg, Oral, HS, Denys Griffith MD, 40 mg at 11/22/21 2156    Baricitinib (OLUMIANT) (COVID EUA) partial tablet 1 mg, 1 mg, Oral, Q24H, Denys Griffith MD, 1 mg at 11/22/21 1421    ceftriaxone (ROCEPHIN) 1 g/50 mL in dextrose IVPB, 1,000 mg, Intravenous, Q24H, Denys Griffith MD, Last Rate: 100 mL/hr at 11/23/21 0807, 1,000 mg at 11/23/21 0807    dexamethasone (DECADRON) 11 1 mg in sodium chloride 0 9 % 50 mL IVPB, 0 1 mg/kg, Intravenous, Q12H [address], CIELO Hoover, Last Rate: 100 mL/hr at 11/23/21 0937, 11 1 mg at 11/23/21 0937    doxycycline hyclate (VIBRAMYCIN) capsule 100 mg, 100 mg, Oral, Q12H, Josh Jacob MD, 100 mg at 11/23/21 0807    guaiFENesin (MUCINEX) 12 hr tablet 600 mg, 600 mg, Oral, Q12H [address], Josh Jacob MD, 600 mg at 11/23/21 0807    heparin (porcine) subcutaneous injection 5,000 Units, 5,000 Units, Subcutaneous, Q8H [address], Josh Jacob MD, 5,000 Units at 11/23/21 0550    hydrocodone-chlorpheniramine polistirex (TUSSIONEX) ER suspension 5 mL, 5 mL, Oral, Q12H PRN, Josh Jacob MD, 5 mL at 11/22/21 2038    hydrOXYzine HCL (ATARAX) tablet 25 mg, 25 mg, Oral, Q6H PRN, Major Shai, CIELO, 25 mg at 11/22/21 2304    insulin glargine (LANTUS) subcutaneous injection 10 Units 0 1 mL, 10 Units, Subcutaneous, Once, Josh Jacob MD    insulin glargine (LANTUS) subcutaneous injection 30 Units 0 3 mL, 30 Units, Subcutaneous, HS, Josh Jacob MD, 30 Units at 11/22/21 2156    insulin lispro (HumaLOG) 100 units/mL subcutaneous injection 1-5 Units, 1-5 Units, Subcutaneous, TID AC, 5 Units at 11/23/21 0808 **AND** Fingerstick Glucose (POCT), , , TID AC, Yfn Vicente MD    insulin lispro (HumaLOG) 100 units/mL subcutaneous injection 10 Units, 10 Units, Subcutaneous, TID With Meals, Josh Jacob MD, 10 Units at 11/23/21 0808    isosorbide mononitrate (IMDUR) 24 hr tablet 60 mg, 60 mg, Oral, QAM, Valjean Glimpse, CRNP, 60 mg at 11/23/21 0807    loratadine (CLARITIN) tablet 10 mg, 10 mg, Oral, Daily, Josh Jacob MD, 10 mg at 11/23/21 0807    melatonin tablet 6 mg, 6 mg, Oral, HS, Kristin Hassan, VIRANP, 6 mg at 11/22/21 2156    oxyCODONE (ROXICODONE) IR tablet 5 mg, 5 mg, Oral, Q4H PRN, Josh Jacob MD, 5 mg at 11/22/21 2038    pantoprazole (PROTONIX) EC tablet 40 mg, 40 mg, Oral, Early Morning, Josh Jacob MD, 40 mg at 11/23/21 2846   phenol (CHLORASEPTIC) 1 4 % mucosal liquid 1 spray, 1 spray, Mouth/Throat, Q2H PRN, Nas Olmedo MD, 1 spray at 11/20/21 1716    sodium bicarbonate tablet 650 mg, 650 mg, Oral, BID after meals, CIELO Hagen, 650 mg at 11/23/21 0806    sodium chloride (OCEAN) 0 65 % nasal spray 1 spray, 1 spray, Each Nare, Q1H PRN, Nas Olmedo MD    tiZANidine (ZANAFLEX) tablet 4 mg, 4 mg, Oral, Q8H PRN, Nas Olmedo MD, 4 mg at 11/19/21 1833    Laboratory Results:  Results from last 7 days   Lab Units 11/23/21  0446 11/22/21  0548 11/21/21  0452 11/20/21  0443 11/20/21  0443 11/19/21  0652 11/19/21  0652   WBC Thousand/uL 11 93* 17 07* 14 47*   < > 17 84*   < > 19 70*   HEMOGLOBIN g/dL 12 3 12 8 12 6   < > 12 6   < > 13 0   HEMATOCRIT % 35 1* 40 5 39 4   < > 39 7   < > 41 6   PLATELETS Thousands/uL 154 152 125*   < > 113*   < > 106*   SODIUM mmol/L 134* 136 132*   < > 130*   < > 129*   POTASSIUM mmol/L 5 1 4 8 5 4*   < > 5 5*   < > 5 4*   CHLORIDE mmol/L 100 101 100   < > 99*   < > 98*   CO2 mmol/L 22 21 21   < > 20*   < > 22   BUN mg/dL 53* 62* 55*   < > 40*   < > 26*   CREATININE mg/dL 2 12* 2 48* 2 46*   < > 2 71*   < > 2 13*   CALCIUM mg/dL 8 2* 8 7 8 3   < > 8 1*   < > 8 2*   MAGNESIUM mg/dL  --   --   --   --   --   --  1 6   ALK PHOS U/L  --   --  151*  --  117*  --  165*   ALT U/L  --   --  41  --  42  --  52   AST U/L  --   --  98*  --  84*  --  80*    < > = values in this interval not displayed

## 2021-11-24 NOTE — PLAN OF CARE
Problem: CARDIOVASCULAR - ADULT  Goal: Maintains optimal cardiac output and hemodynamic stability  Description: INTERVENTIONS:  - Monitor I/O, vital signs and rhythm  - Monitor for S/S and trends of decreased cardiac output  - Administer and titrate ordered vasoactive medications to optimize hemodynamic stability  - Assess quality of pulses, skin color and temperature  - Assess for signs of decreased coronary artery perfusion  - Instruct patient to report change in severity of symptoms  Outcome: Progressing     Problem: RESPIRATORY - ADULT  Goal: Achieves optimal ventilation and oxygenation  Description: INTERVENTIONS:  - Assess for changes in respiratory status  - Assess for changes in mentation and behavior  - Position to facilitate oxygenation and minimize respiratory effort  - Oxygen administered by appropriate delivery if ordered  - Initiate smoking cessation education as indicated  - Encourage broncho-pulmonary hygiene including cough, deep breathe, Incentive Spirometry  - Assess the need for suctioning and aspirate as needed  - Assess and instruct to report SOB or any respiratory difficulty  - Respiratory Therapy support as indicated  Outcome: Progressing     Problem: GENITOURINARY - ADULT  Goal: Maintains or returns to baseline urinary function  Description: INTERVENTIONS:  - Assess urinary function  - Encourage oral fluids to ensure adequate hydration if ordered  - Administer IV fluids as ordered to ensure adequate hydration  - Administer ordered medications as needed  - Offer frequent toileting  - Follow urinary retention protocol if ordered  Outcome: Progressing     Problem: METABOLIC, FLUID AND ELECTROLYTES - ADULT  Goal: Electrolytes maintained within normal limits  Description: INTERVENTIONS:  - Monitor labs and assess patient for signs and symptoms of electrolyte imbalances  - Administer electrolyte replacement as ordered  - Monitor response to electrolyte replacements, including repeat lab results as appropriate  - Instruct patient on fluid and nutrition as appropriate  Outcome: Progressing  Goal: Fluid balance maintained  Description: INTERVENTIONS:  - Monitor labs   - Monitor I/O and WT  - Instruct patient on fluid and nutrition as appropriate  - Assess for signs & symptoms of volume excess or deficit  Outcome: Progressing  Goal: Glucose maintained within target range  Description: INTERVENTIONS:  - Monitor Blood Glucose as ordered  - Assess for signs and symptoms of hyperglycemia and hypoglycemia  - Administer ordered medications to maintain glucose within target range  - Assess nutritional intake and initiate nutrition service referral as needed  Outcome: Progressing

## 2021-11-24 NOTE — PROGRESS NOTES
Vancomycin IV Pharmacy-to-Dose Consultation    Amanda Pang is a 77 y o  male who is currently receiving Vancomycin IV with management by the Pharmacy Consult service  Assessment/Plan:  The patient was reviewed  Renal function is stable and no signs or symptoms of nephrotoxicity and/or infusion reactions were documented in the chart  Based on todays assessment, continue current vancomycin (day # 2) dosing of 1500 mg IV q24h, with a plan for trough to be drawn 11/26/21 at 1545  We will continue to follow the patients culture results and clinical progress daily      Garrett Mcleod, Pharmacist

## 2021-11-24 NOTE — PLAN OF CARE
Problem: PHYSICAL THERAPY ADULT  Goal: Performs mobility at highest level of function for planned discharge setting  See evaluation for individualized goals  Description: Treatment/Interventions: Functional transfer training,LE strengthening/ROM,Therapeutic exercise,Elevations,Endurance training,Patient/family training,Bed mobility,Equipment eval/education,Gait training,Continued evaluation,Compensatory technique education,Spoke to nursing,OT          See flowsheet documentation for full assessment, interventions and recommendations  Outcome: Progressing  Note: Prognosis: Fair  Problem List: Decreased endurance,Impaired balance,Decreased mobility,Obesity,Pain  Assessment: Guzman Salazar was seen for a f/u session  O2 85-97% on 15L midflow throughout session  Able to progress to ambulation w use of RW to improve stability  O2 >85% during OOB activity with quick recovery to 88% without need for NRB support  Continues to be limited by general feelings of being unwell, including pain and CHILDS  Given decline in function including inability to negotiate home environment (steps, household distances) and increased risk for falls at current LOF, would benefit from STR upon dc  Barriers to Discharge: Other (Comment) (decline in function)        PT Discharge Recommendation: Post acute rehabilitation services     PT - OK to Discharge: Yes    See flowsheet documentation for full assessment

## 2021-11-24 NOTE — PROGRESS NOTES
Progress Note - Infectious Disease   Antione Rank 77 y o  male MRN: [id number]  Unit/Bed#: [place] [address] -01 Encounter: [id number]      Impression/Plan:    1  Staph aureus bacteremia   Present in 1/2 sets of admission blood cultures  Also growing staph epidermidis  It is difficult to call staph aureus a contaminant although this is a possibility  Given the high risk with inappropriate treatment, would treat as a true pathogen  Unclear source  Consider superimposed staph pneumonia in setting of sputum production and COVID 19 infection  TTE no vegetations  Has PiV in place, no central lines or intravascular devices                -follow up repeat blood cultures              -for now, continue vancomycin, dosing by pharmacy   -collect sputum culture if able   -anticipate a 2 week course of IV antibiotics if repeat cultures remain negative                2  Staph epidermidis bacteremia  1/2 sets admission blood cultures growing staph epidermidis  Likely contaminant               -follow up repeat cultures              -on vancomycin for staph aureus in blood culture     3  Sepsis-POA  Leukocytosis, tachypnea  Due to COVID 19 infection, staph aureus bacteremia  Consider possibility of superimposed bacterial pneumonia given productive cough and staph aureus in blood culture  Procalcitonin is elevated, but likely due to HUBER as trend corresponds to changes in creatinine and is now improving  Regardless, he has completed a 5 day course of Ceftriaxone and doxycycline               -vancomycin as above              -monitor fever curve, WBC count     4  Hypoxic respiratory failure  Secondary to COVID 19 infection  Remains on 15L midflow O2               -continue COVID specific therapies              -wean O2 as able              -supportive care per primary     5  COVID-19 infection  Patient unvaccinated  Positive 11/17   Started on moderate pathway               -s/p 5 days remdesivir              -continue steroids, baractinib              -continue O2, wean as able     6  Type 2 diabetes mellitus, hyperglycemia  HbA1c 8%  Also with steroid induced hyperglycemia                -glycemic management per primary team     7  HUBER on CKD3  Creatinine increased after admission due to poor PO intake, infection  Now improved to baseline              -nephrology following     I have discussed the above management plan in detail with the primary service and patient  ID will follow  Antibiotics:  Vancomycin D2    Subjective:  Patient with continued shortness of breath, but states he is feeling a little better  Saturations improving with proning  Remains on 15L mid flow  No fever, chills, nausea  Objective:  Vitals:  Temp:  [97 4 °F (36 3 °C)-98 °F (36 7 °C)] 97 4 °F (36 3 °C)  HR:  [67-87] 67  Resp:  [16-22] 22  BP: (109-167)/(49-80) 109/49  SpO2:  [90 %-99 %] 90 %  Temp (24hrs), Av 7 °F (36 5 °C), Min:97 4 °F (36 3 °C), Max:98 °F (36 7 °C)  Current: Temperature: (!) 97 4 °F (36 3 °C)    Physical Exam:   General Appearance: Tachypnea but appears non toxic   Throat: Oropharynx moist without lesions  Lungs:   Decreased breath sounds bilaterally, +tachypnea   Heart:  RRR; no murmur, rub or gallop   Abdomen:   Soft, non-tender, non-distended, positive bowel sounds  Extremities: No clubbing, cyanosis or edema   Skin: No new rashes or lesions  No draining wounds noted  Labs:    All pertinent labs and imaging studies were personally reviewed  Results from last 7 days   Lab Units 21  0548   WBC Thousand/uL 15 05* 11 93* 17 07*   HEMOGLOBIN g/dL 13 0 12 3 12 8   PLATELETS Thousands/uL 180 154 152     Results from last 7 days   Lab Units 21  0452 21  0446 21  0446 21  0548 21  0548 21  0452 21  0452 21  0443 21  0443 21  0652 21  0652   SODIUM mmol/L 140  --  134*  --  136   < > 132*   < > 130*   < > 129*   POTASSIUM mmol/L 4 6   < > 5 1   < > 4 8   < > 5 4*   < > 5 5*   < > 5 4*   CHLORIDE mmol/L 104   < > 100   < > 101   < > 100   < > 99*   < > 98*   CO2 mmol/L 24   < > 22   < > 21   < > 21   < > 20*   < > 22   BUN mg/dL 47*   < > 53*   < > 62*   < > 55*   < > 40*   < > 26*   CREATININE mg/dL 1 91*   < > 2 12*   < > 2 48*   < > 2 46*   < > 2 71*   < > 2 13*   EGFR ml/min/1 73sq m 35   < > 31   < > 26   < > 26   < > 23   < > 31   CALCIUM mg/dL 8 4   < > 8 2*   < > 8 7   < > 8 3   < > 8 1*   < > 8 2*   AST U/L  --   --   --   --   --   --  98*  --  84*  --  80*   ALT U/L  --   --   --   --   --   --  41   < > 42   < > 52   ALK PHOS U/L  --   --   --   --   --   --  151*   < > 117*   < > 165*    < > = values in this interval not displayed  Results from last 7 days   Lab Units 11/22/21  0548 11/21/21  0452 11/20/21  0443 11/19/21  0652   PROCALCITONIN ng/ml 11 69* 21 52* 36 64* 24 66*     Results from last 7 days   Lab Units 11/21/21  0452 11/20/21  0443 11/19/21  0652   CRP mg/L 195 4* 215 3* 167 6*     Results from last 7 days   Lab Units 11/19/21  0652   FERRITIN ng/mL 567*     Results from last 7 days   Lab Units 11/19/21  0652   D-DIMER QUANTITATIVE ug/ml FEU 1 21*       Micro:  Results from last 7 days   Lab Units 11/22/21  1801 11/22/21  1800 11/19/21  0652 11/19/21  0650   BLOOD CULTURE  No Growth at 24 hrs  No Growth at 24 hrs  Staphylococcus aureus*  Staphylococcus epidermidis* No Growth After 5 Days  GRAM STAIN RESULT   --   --  Gram positive cocci in clusters*  --        Imaging:  I have personally reviewed pertinent imaging study reports and images in PACS     CXR 11/19/21:  Bilateral groundglass opacities      Other Studies:   I have personally reviewed pertinent reports

## 2021-11-24 NOTE — ASSESSMENT & PLAN NOTE
Results from last 7 days   Lab Units 11/24/21  0452 11/23/21  0446 11/22/21  0548 11/21/21  0452   CREATININE mg/dL 1 91* 2 12* 2 48* 2 46*   Previous lab shows patient's baseline creatinine roughly around 1 8-2  Likely prerenal in setting of COVID, poor p o   Intake, mildly elevated CK  Nephrology following-creatinine continues to improve

## 2021-11-24 NOTE — ASSESSMENT & PLAN NOTE
One of 2 blood cultures from 11/19 growing Gram-positive cocci: Staph aureus & Staph epidermis  Seen and evaluated by Infectious Disease-treating Staph aureus as a true pathogen  Started on IV vancomycin 11/23  Repeat blood cultures -currently with no growth  Await ID input

## 2021-11-24 NOTE — ASSESSMENT & PLAN NOTE
33-year-old male- DM2, CAD, hypertension presented with shortness of breath and cough  Patient found to be septic, tachypnea, leukocytosis  COVID positive 11/17, unvaccinated  Checks x-ray shows multifocal pneumonia  Procalcitonin significantly elevated, concern for superimposed bacterial infection  Completed IV Rocephin and remdesivir x5 days  Currently on IV steroids day # 6/10, baricitnib day # 5/14  Encourage self prone in, incentive spirometry, ambulation as able  Was on non-rebreather and midflow at 15 L  Tapered off non-rebreather and currently on 15 L mid flow  Seen and evaluated by pulmonology

## 2021-11-24 NOTE — PLAN OF CARE
Problem: PAIN - ADULT  Goal: Verbalizes/displays adequate comfort level or baseline comfort level  Description: Interventions:  - Encourage patient to monitor pain and request assistance  - Assess pain using appropriate pain scale  - Administer analgesics based on type and severity of pain and evaluate response  - Implement non-pharmacological measures as appropriate and evaluate response  - Consider cultural and social influences on pain and pain management  - Notify physician/advanced practitioner if interventions unsuccessful or patient reports new pain  Outcome: Progressing     Problem: INFECTION - ADULT  Goal: Absence or prevention of progression during hospitalization  Description: INTERVENTIONS:  - Assess and monitor for signs and symptoms of infection  - Monitor lab/diagnostic results  - Monitor all insertion sites, i e  indwelling lines, tubes, and drains  - Monitor endotracheal if appropriate and nasal secretions for changes in amount and color  - [place] appropriate cooling/warming therapies per order  - Administer medications as ordered  - Instruct and encourage patient and family to use good hand hygiene technique  - Identify and instruct in appropriate isolation precautions for identified infection/condition  Outcome: Progressing     Problem: RESPIRATORY - ADULT  Goal: Achieves optimal ventilation and oxygenation  Description: INTERVENTIONS:  - Assess for changes in respiratory status  - Assess for changes in mentation and behavior  - Position to facilitate oxygenation and minimize respiratory effort  - Oxygen administered by appropriate delivery if ordered  - Initiate smoking cessation education as indicated  - Encourage broncho-pulmonary hygiene including cough, deep breathe, Incentive Spirometry  - Assess the need for suctioning and aspirate as needed  - Assess and instruct to report SOB or any respiratory difficulty  - Respiratory Therapy support as indicated  Outcome: Progressing     Problem: CARDIOVASCULAR - ADULT  Goal: Maintains optimal cardiac output and hemodynamic stability  Description: INTERVENTIONS:  - Monitor I/O, vital signs and rhythm  - Monitor for S/S and trends of decreased cardiac output  - Administer and titrate ordered vasoactive medications to optimize hemodynamic stability  - Assess quality of pulses, skin color and temperature  - Assess for signs of decreased coronary artery perfusion  - Instruct patient to report change in severity of symptoms  Outcome: Progressing     Problem: GENITOURINARY - ADULT  Goal: Maintains or returns to baseline urinary function  Description: INTERVENTIONS:  - Assess urinary function  - Encourage oral fluids to ensure adequate hydration if ordered  - Administer IV fluids as ordered to ensure adequate hydration  - Administer ordered medications as needed  - Offer frequent toileting  - Follow urinary retention protocol if ordered  Outcome: Progressing     Problem: METABOLIC, FLUID AND ELECTROLYTES - ADULT  Goal: Electrolytes maintained within normal limits  Description: INTERVENTIONS:  - Monitor labs and assess patient for signs and symptoms of electrolyte imbalances  - Administer electrolyte replacement as ordered  - Monitor response to electrolyte replacements, including repeat lab results as appropriate  - Instruct patient on fluid and nutrition as appropriate  Outcome: Progressing  Goal: Fluid balance maintained  Description: INTERVENTIONS:  - Monitor labs   - Monitor I/O and WT  - Instruct patient on fluid and nutrition as appropriate  - Assess for signs & symptoms of volume excess or deficit  Outcome: Progressing  Goal: Glucose maintained within target range  Description: INTERVENTIONS:  - Monitor Blood Glucose as ordered  - Assess for signs and symptoms of hyperglycemia and hypoglycemia  - Administer ordered medications to maintain glucose within target range  - Assess nutritional intake and initiate nutrition service referral as needed  Outcome: Progressing     Problem: MUSCULOSKELETAL - ADULT  Goal: Maintain or return mobility to safest level of function  Description: INTERVENTIONS:  - Assess patient's ability to carry out ADLs; assess patient's baseline for ADL function and identify physical deficits which impact ability to perform ADLs (bathing, care of mouth/teeth, toileting, grooming, dressing, etc )  - Assess/evaluate cause of self-care deficits   - Assess range of motion  - Assess patient's mobility  - Assess patient's need for assistive devices and provide as appropriate  - Encourage maximum independence but intervene and supervise when necessary  - Involve family in performance of ADLs  - Assess for home care needs following discharge   - Consider OT consult to assist with ADL evaluation and planning for discharge  - Provide patient education as appropriate  Outcome: Progressing

## 2021-11-24 NOTE — PHYSICAL THERAPY NOTE
PHYSICAL THERAPY NOTE          Patient Name: Vane Hunter Date: 11/24/2021   Time: 1948-4814       11/24/21 1517   PT Last Visit   PT Visit Date 11/24/21   Note Type   Note Type Treatment   Pain Assessment   Pain Assessment Tool FLACC   Pain Location/Orientation Orientation: Upper; Location: Abdomen; Location: Head;Other (Comment)  (throat)   Pain Rating: FLACC (Rest) - Face 1   Pain Rating: FLACC (Rest) - Legs 0   Pain Rating: FLACC (Rest) - Activity 0   Pain Rating: FLACC (Rest) - Cry 1   Pain Rating: FLACC (Rest) - Consolability 0   Score: FLACC (Rest) 2   Pain Rating: FLACC (Activity) - Face 1   Pain Rating: FLACC (Activity) - Legs 0   Pain Rating: FLACC (Activity) - Activity 0   Pain Rating: FLACC (Activity) - Cry 1   Pain Rating: FLACC (Activity) - Consolability 0   Score: FLACC (Activity) 2   Restrictions/Precautions   Other Precautions Contact/isolation; Airborne/isolation; Chair Alarm; Bed Alarm;Multiple lines;Telemetry;O2;Fall Risk  (15L  midflow)   General   Chart Reviewed Yes   Response to Previous Treatment Patient with no complaints from previous session  Cognition   Overall Cognitive Status WFL   Arousal/Participation Cooperative   Subjective   Subjective agreeable to PT   Bed Mobility   Rolling R 6  Modified independent   Additional items Bedrails   Rolling L 6  Modified independent   Additional items Bedrails; Increased time required   Supine to Sit 5  Supervision   Additional items Bedrails; Increased time required   Sit to Supine 6  Modified independent   Additional items Bedrails; Increased time required   Transfers   Sit to Stand 5  Supervision   Additional items Bedrails; Increased time required; Other  (AD)   Stand to Sit 5  Supervision   Additional items Increased time required; Other  (AD)   Toilet transfer 5  Supervision   Additional items Commode   Ambulation/Elevation   Gait pattern Short stride; Excessively slow   Gait Assistance 5  Supervision   Additional items Assist x 1   Assistive Device Rolling walker   Distance 7'x2 w seated rest between   Balance   Static Standing Fair  (w RW)   Dynamic Standing [address] -  (w RW)   Endurance Deficit   Endurance Deficit Yes   Endurance Deficit Description generally fatigued  O2 85-97% on 15L midflow during session  desat w bed mobility/ rolling  O2 94% at EOB  desat to 85% w initial ambulation w quick recovery to 88% on midflow (did not need NRB)  O2 >90% during second ambulation   Activity Tolerance   Activity Tolerance Patient limited by fatigue;Treatment limited secondary to medical complications (Comment)   Nurse Made Aware RN   Exercises   Hip Abduction 10 reps;Right  (sidelying 2x10)   Assessment   Prognosis Fair   Problem List Decreased endurance; Impaired balance;Decreased mobility;Obesity;Pain   Assessment Vicky Augustine was seen for a f/u session  O2 85-97% on 15L midflow throughout session  Able to progress to ambulation w use of RW to improve stability  O2 >85% during OOB activity with quick recovery to 88% without need for NRB support  Continues to be limited by general feelings of being unwell, including pain and CHILDS  Given decline in function including inability to negotiate home environment (steps, household distances) and increased risk for falls at current LOF, would benefit from STR upon dc  Barriers to Discharge Other (Comment)  (decline in function)   Goals   Patient Goals to feel better   [facility] Expiration Date 12/06/21   Short Term Goal #1 1)  Pt will perform bed mobility with Doni demonstrating appropriate technique 100% of the time in order to improve function  2)  Perform all transfers with Doni demonstrating safe and appropriate technique 100% of the time in order to improve ability to negotiate safely in home environment  3) Amb with least restrictive AD > 50'x1 with mod I in order to demonstrate ability to negotiate in home environment  4)  Improve overall strength and balance 1/2 grade in order to optimize ability to perform functional tasks and reduce fall risk  5) Increase activity tolerance to 45 minutes in order to improve endurance to functional tasks  6)  Negotiate stairs using most appropriate technique and S in order to be able to negotiate safely in home environment  7) PT for ongoing patient and family/caregiver education, DME needs and d/c planning in order to promote highest level of function in least restrictive environment  PT Treatment Day 1   Plan   Treatment/Interventions Functional transfer training;LE strengthening/ROM; Elevations; Therapeutic exercise; Endurance training;Patient/family training;Equipment eval/education; Bed mobility;Gait training; Compensatory technique education;Continued evaluation;Spoke to nursing   Progress Slow progress, decreased activity tolerance   PT Frequency Other (Comment)  (4-5x)   Recommendation   PT Discharge Recommendation Post acute rehabilitation services   PT - OK to Discharge Yes   Additional Comments The patient's AM-PAC Basic Mobility Inpatient Short Form Raw Score is 19  A Raw score of greater than 16 suggests the patient may benefit from discharge to home  Please also refer to the recommendation of the Physical Therapist for safe discharge planning  Given decline in function including unsteadiness and increased reliance on AD would benefit from STR      AM-PAC Basic Mobility Inpatient   Turning in Bed Without Bedrails 4   Lying on Back to Sitting on Edge of Flat Bed 3   Moving Bed to Chair 3   Standing Up From Chair 3   Walk in Room 3   Climb 3-5 Stairs 3   Basic Mobility Inpatient Raw Score 19   Basic Mobility Standardized Score 42 48     Kole Salmeron, PT

## 2021-11-24 NOTE — PROGRESS NOTES
NEPHROLOGY PROGRESS NOTE   Tyron Good 77 y o  male MRN: [id number]  Unit/Bed#: [place] 68 2 -01 Encounter: [id number]  Reason for Consult: HUBER/CKD    ASSESSMENT/PLAN:  1  Acute Kidney Injury, POA- secondary to ATN from COVID  - creatinine now at baseline  - imaging: no hydronephrosis  - holding diuretics for now  - continue to monitor for need to restart diuretics  2  Chronic Kidney Disease stage III- Baseline creatinine 1 8-2 1  Follows with [address] [facility]  3  Hypertension- BP acceptable overall  4  Metabolic Acidosis- continue sodium bicarbonate tablets  5  Hyperkalemia- improved  6  CHF- holding diuretics  - monitor for need for restarting lasix  - weight down to 103kg (227 lbs)  - making urine  7  COVID 19 infection- per primary team    Disposition:  Monitor for need to start low dose diuretics  SUBJECTIVE:  Patient telling me he needs his insulin on the phone but when I asked nurse about this she states she just gave it to him before I called into the room  States he is anxious  States he does well with laying on his side and oxygenates well during this time but desaturates to 88% with sitting up  OBJECTIVE:  Current Weight: Weight - Scale: 103 kg (226 lb 13 7 oz)  Vitals:    11/24/21 0600 11/24/21 0739 11/24/21 1104 11/24/21 1504   BP:  158/80 (!) 109/49 146/60   BP Location:       Pulse:  87 67 69   Resp:  21 22 20   Temp:  (!) 97 4 °F (36 3 °C) 98 °F (36 7 °C) (!) 96 7 °F (35 9 °C)   TempSrc:  Oral Axillary    SpO2:  95% 90% 94%   Weight: 103 kg (226 lb 13 7 oz)      Height:           Intake/Output Summary (Last 24 hours) at 11/24/2021 1539  Last data filed at 11/24/2021 1300  Gross per 24 hour   Intake 480 ml   Output --   Net 480 ml     Due to COVID 19 infection, I examined the patient from the window and spoke with him on the phone  I talked with his nurse as well    General: NAD  Skin: no rash  Eyes: anicteric  ENMT: mm moist  Neck: no masses  Respiratory: CTAB  Cardiac: RRR  Extremities: + LE edema  GI: soft nt nd  Neuro: alert awake  Psych: anxious    Medications:    Current Facility-Administered Medications:     acetaminophen (TYLENOL) tablet 650 mg, 650 mg, Oral, Q6H PRN, Hue Alvarez MD, 650 mg at 11/23/21 1616    amLODIPine (NORVASC) tablet 5 mg, 5 mg, Oral, Daily, Monserrat Chamber, CRNP, 5 mg at 11/24/21 0809    atorvastatin (LIPITOR) tablet 40 mg, 40 mg, Oral, HS, Hue Alvarez MD, 40 mg at 11/23/21 2145    Baricitinib (OLUMIANT) (COVID EUA) partial tablet 1 mg, 1 mg, Oral, Q24H, Hue Alvarez MD, 1 mg at 11/24/21 1352    dexamethasone (DECADRON) 11 1 mg in sodium chloride 0 9 % 50 mL IVPB, 0 1 mg/kg, Intravenous, Q12H [address], Shayla Kim, CIELO, Stopped at 11/24/21 0900    guaiFENesin (MUCINEX) 12 hr tablet 600 mg, 600 mg, Oral, Q12H [address], Hue Alvarez MD, 600 mg at 11/24/21 0809    heparin (porcine) subcutaneous injection 5,000 Units, 5,000 Units, Subcutaneous, Q8H [address], Hue Alvarez MD, 5,000 Units at 11/24/21 1351    hydrocodone-chlorpheniramine polistirex (TUSSIONEX) ER suspension 5 mL, 5 mL, Oral, Q12H PRN, Hue Alvarez MD, 5 mL at 11/22/21 2038    hydrOXYzine HCL (ATARAX) tablet 25 mg, 25 mg, Oral, Q6H PRN, Tramaine Leah, CRNP, 25 mg at 11/24/21 0538    insulin glargine (LANTUS) subcutaneous injection 35 Units 0 35 mL, 35 Units, Subcutaneous, HS, Mable Lieberman PA-C, 35 Units at 11/23/21 2145    insulin lispro (HumaLOG) 100 units/mL subcutaneous injection 1-5 Units, 1-5 Units, Subcutaneous, TID AC, 1 Units at 11/24/21 1130 **AND** Fingerstick Glucose (POCT), , , TID AC, Yfn Rivera MD    insulin lispro (HumaLOG) 100 units/mL subcutaneous injection 15 Units, 15 Units, Subcutaneous, TID With Meals, [employer], PA-C, 15 Units at 11/24/21 1130    isosorbide mononitrate (IMDUR) 24 hr tablet 60 mg, 60 mg, Oral, QA, [place] Freedman, 60 mg at 11/24/21 0809    loratadine (CLARITIN) tablet 10 mg, 10 mg, Oral, Daily, Hue Alvarez MD, 10 mg at 11/24/21 0809    melatonin tablet 6 mg, 6 mg, Oral, HS, CIELO Osuna, 6 mg at 11/23/21 2145    oxyCODONE (ROXICODONE) IR tablet 5 mg, 5 mg, Oral, Q4H PRN, Malik Craig MD, 5 mg at 11/24/21 0539    pantoprazole (PROTONIX) EC tablet 40 mg, 40 mg, Oral, Early Morning, Malik Craig MD, 40 mg at 11/24/21 0536    phenol (CHLORASEPTIC) 1 4 % mucosal liquid 1 spray, 1 spray, Mouth/Throat, Q2H PRN, Malik Craig MD, 1 spray at 11/20/21 1716    sodium bicarbonate tablet 650 mg, 650 mg, Oral, BID after meals, CIELO Fritz, 650 mg at 11/24/21 0809    sodium chloride (OCEAN) 0 65 % nasal spray 1 spray, 1 spray, Each Nare, Q1H PRN, Malik Craig MD    tiZANidine (ZANAFLEX) tablet 4 mg, 4 mg, Oral, Q8H PRN, Malik Craig MD, 4 mg at 11/24/21 0809    vancomycin (VANCOCIN) 1500 mg in sodium chloride 0 9% 250 mL IVPB, 20 mg/kg (Adjusted), Intravenous, Q24H, Aubrey Maxwell PA-C    Laboratory Results:  Results from last 7 days   Lab Units 11/24/21  0452 11/23/21  0446 11/22/21  0548 11/21/21  0452 11/20/21  0443 11/19/21  0652   WBC Thousand/uL 15 05* 11 93* 17 07* 14 47* 17 84* 19 70*   HEMOGLOBIN g/dL 13 0 12 3 12 8 12 6 12 6 13 0   HEMATOCRIT % 39 6 35 1* 40 5 39 4 39 7 41 6   PLATELETS Thousands/uL 180 154 152 125* 113* 106*   POTASSIUM mmol/L 4 6 5 1 4 8 5 4* 5 5* 5 4*   CHLORIDE mmol/L 104 100 101 100 99* 98*   CO2 mmol/L 24 22 21 21 20* 22   BUN mg/dL 47* 53* 62* 55* 40* 26*   CREATININE mg/dL 1 91* 2 12* 2 48* 2 46* 2 71* 2 13*   CALCIUM mg/dL 8 4 8 2* 8 7 8 3 8 1* 8 2*   MAGNESIUM mg/dL  --   --   --   --   --  1 6     I have personally reviewed the blood work as stated above and in my note

## 2021-11-24 NOTE — PROGRESS NOTES
[facility]atheus Marro[facility]  Progress Note - Antione Rank 1955, 77 y o  male MRN: [id number]  Unit/Bed#: [place] [facility]ott [place] 87 216-01 Encounter: [id number]  Primary Care Provider: Joan Acuna MD   Date and time admitted to hospital: 11/19/2021  6:27 AM    * Acute hypoxemic respiratory failure due to COVID-19 [facility])  Assessment & Plan  73-year-old male- DM2, CAD, hypertension presented with shortness of breath and cough  Patient found to be septic, tachypnea, leukocytosis  COVID positive 11/17, unvaccinated  Checks x-ray shows multifocal pneumonia  Procalcitonin significantly elevated, concern for superimposed bacterial infection  Completed IV Rocephin and remdesivir x5 days  Currently on IV steroids day # 6/10, baricitnib day # 5/14  Encourage self prone in, incentive spirometry, ambulation as able  Was on non-rebreather and midflow at 15 L  Tapered off non-rebreather and currently on 15 L mid flow  Seen and evaluated by pulmonology    Gram-positive bacteremia  Assessment & Plan  One of 2 blood cultures from 11/19 growing Gram-positive cocci: Staph aureus & Staph epidermis  Seen and evaluated by Infectious Disease-treating Staph aureus as a true pathogen  Started on IV vancomycin 11/23  Repeat blood cultures -currently with no growth  Await ID input    Sepsis due to COVID-19 [facility])  Assessment & Plan  Leukocytosis and tachypneic on admission in setting of COVID pneumonia  Treatment as above    HUBER (acute kidney injury) [facility])  Assessment & Plan  Results from last 7 days   Lab Units 11/24/21  0452 11/23/21  0446 11/22/21  0548 11/21/21  0452   CREATININE mg/dL 1 91* 2 12* 2 48* 2 46*   Previous lab shows patient's baseline creatinine roughly around 1 8-2  Likely prerenal in setting of COVID, poor p o   Intake, mildly elevated CK  Nephrology following-creatinine continues to improve    Essential hypertension  Assessment & Plan  Home regimen amlodipine 5 mg daily  Lasix currently on hold given HUBER    Hx of CABG  Assessment & Plan  History of CAD status post CABG and stents  Not on aspirin or beta blockers  Continue Imdur and statin    Type 2 diabetes mellitus, without long-term current use of insulin (HCC)  Assessment & Plan  Lab Results   Component Value Date    HGBA1C 8 0 (H) 2021   Improving appetite, hyperglycemia secondary to steroids  Increased Lantus to 30 units hs, humalog 10 units with meals  Still with hyperglycemia in the 400s    - increased to humalog 15 units TID   - increased to lantus 35 units HS  Blood glucose improved today    Hyperkalemia-resolved as of 2021  Assessment & Plan  Hyperkalemia in the setting of HUBER  Now resolved      VTE Pharmacologic Prophylaxis:   Pharmacologic: Heparin  Mechanical VTE Prophylaxis in Place: Yes    Discharge Plan: With need for continued inpatient stay for COVID and oxygen requirements    Discussions with Specialists or Other Care Team Provider:  Nursing    Education and Discussions with Family / Patient:  Patient, son via telephone - updated    Time Spent for Care: 30 minutes  More than 50% of total time spent on counseling and coordination of care as described above  Current Length of Stay: 5 day(s)  Current Patient Status: Inpatient   Code Status: Level 1 - Full Code    Subjective:   Patient resting in bed  No further headache  Requesting more food as he is hungry  Remains on 15 L mid flow  Was tapered off non-rebreather yesterday  Spoke with nursing - Saturations improved with proning and lying on side  Objective:     Vitals:   Temp (24hrs), Av 7 °F (36 5 °C), Min:97 4 °F (36 3 °C), Max:98 °F (36 7 °C)    Temp:  [97 4 °F (36 3 °C)-98 °F (36 7 °C)] 97 4 °F (36 3 °C)  HR:  [67-87] 67  Resp:  [16-22] 22  BP: (109-167)/(49-80) 109/49  SpO2:  [90 %-99 %] 90 %  Body mass index is 38 94 kg/m²       Input and Output Summary (last 24 hours):     No intake or output data in the 24 hours ending 21 1203    Physical Exam:     Physical Exam  Vitals and nursing note reviewed  Constitutional:       General: He is not in acute distress  Appearance: Normal appearance  He is normal weight  He is not ill-appearing, toxic-appearing or diaphoretic  HENT:      Head: Normocephalic and atraumatic  Eyes:      General: No scleral icterus  Cardiovascular:      Rate and Rhythm: Normal rate and regular rhythm  Pulmonary:      Effort: Pulmonary effort is normal  No respiratory distress  Breath sounds: Normal breath sounds  No stridor  No wheezing or rhonchi  Comments: 15 L midflow  Abdominal:      General: Bowel sounds are normal  There is no distension  Palpations: Abdomen is soft  There is no mass  Tenderness: There is no abdominal tenderness  Hernia: No hernia is present  Musculoskeletal:         General: No swelling  Cervical back: Neck supple  Skin:     General: Skin is warm and dry  Neurological:      Mental Status: He is alert and oriented to person, place, and time  Mental status is at baseline  Psychiatric:         Mood and Affect: Mood normal          Behavior: Behavior normal          Additional Data:     Labs:    Results from last 7 days   Lab Units 11/24/21  0452   WBC Thousand/uL 15 05*   HEMOGLOBIN g/dL 13 0   HEMATOCRIT % 39 6   PLATELETS Thousands/uL 180   NEUTROS PCT % 80*   LYMPHS PCT % 6*   MONOS PCT % 8   EOS PCT % 0     Results from last 7 days   Lab Units 11/24/21  0452 11/22/21  0548 11/21/21  0452   POTASSIUM mmol/L 4 6   < > 5 4*   CHLORIDE mmol/L 104   < > 100   CO2 mmol/L 24   < > 21   BUN mg/dL 47*   < > 55*   CREATININE mg/dL 1 91*   < > 2 46*   CALCIUM mg/dL 8 4   < > 8 3   ALK PHOS U/L  --   --  151*   ALT U/L  --   --  41   AST U/L  --   --  98*    < > = values in this interval not displayed  Results from last 7 days   Lab Units 11/19/21  0652   INR  1 08       * I Have Reviewed All Lab Data Listed Above  * Additional Pertinent Lab Tests Reviewed:  Luis Antonio Ronquillo Admission Reviewed    Imaging:    Imaging Reports Reviewed Today Include:   Imaging Personally Reviewed by Myself Includes:      Recent Cultures (last 7 days):     Results from last 7 days   Lab Units 11/22/21  1801 11/22/21  1800 11/19/21  0652 11/19/21  0650   BLOOD CULTURE  No Growth at 24 hrs  No Growth at 24 hrs  Staphylococcus aureus*  Staphylococcus epidermidis* No Growth After 5 Days     GRAM STAIN RESULT   --   --  Gram positive cocci in clusters*  --        Last 24 Hours Medication List:   Current Facility-Administered Medications   Medication Dose Route Frequency Provider Last Rate    acetaminophen  650 mg Oral Q6H PRN Josselyn Weaver MD      amLODIPine  5 mg Oral Daily Jim Orange, CRNP      atorvastatin  40 mg Oral HS Yfn Shankar MD      Baricitinib  1 mg Oral Q24H Josselyn Weaver MD      dexamethasone  0 1 mg/kg Intravenous Q12H [address] CIELO Yi Stopped (11/24/21 0900)    guaiFENesin  600 mg Oral Q12H [address] Yfn Shankar MD      heparin (porcine)  5,000 Units Subcutaneous [facility] Josselyn Weaver MD      hydrocodone-chlorpheniramine polistirex  5 mL Oral Q12H PRN Radha Shankar MD      hydrOXYzine HCL  25 mg Oral Q6H PRN CIELO White      insulin glargine  35 Units Subcutaneous HS Mable Lieberman PA-C      insulin lispro  1-5 Units Subcutaneous TID AC Yfn Shankar MD      insulin lispro  15 Units Subcutaneous TID With Meals [address]      isosorbide mononitrate  60 mg Oral QAM Jim Orange, CRNP      loratadine  10 mg Oral Daily Josselyn Weaver MD      melatonin  6 mg Oral HS CIELO White      oxyCODONE  5 mg Oral Q4H PRN Josselyn Weaver MD      pantoprazole  40 mg Oral Early Morning Josselyn Weaver MD      phenol  1 spray Mouth/Throat Q2H PRN Josselyn Weaver MD      sodium bicarbonate  650 mg Oral BID after meals Jim Orange, CRNP      sodium chloride  1 spray Each Nare Q1H PRN Radha Shankar MD      tiZANidine  4 mg Oral Q8H PRN Nas Olmedo MD      vancomycin  20 mg/kg (Adjusted) Intravenous Q24H Kiana Rowe PA-C          Today, Patient Was Seen By: Kiana Rowe PA-C    ** Please Note: This note has been constructed using a voice recognition system   **

## 2021-11-24 NOTE — ASSESSMENT & PLAN NOTE
Lab Results   Component Value Date    HGBA1C 8 0 (H) 11/04/2021   Improving appetite, hyperglycemia secondary to steroids  Increased Lantus to 30 units hs, humalog 10 units with meals  Still with hyperglycemia in the 400s 11/23   - increased to humalog 15 units TID   - increased to lantus 35 units HS  Blood glucose improved today

## 2021-11-24 NOTE — PROGRESS NOTES
Progress Note - Pulmonary   Christian Werner 77 y o  male MRN: [id number]  Unit/Bed#: [place] [address] -01 Encounter: [id number]      Assessment/Plan:         1  Acute hypoxic respiratory failure secondary to COVID 19  1  Titrate oxygen as needed to maintain SpO2 >/=88%  2  Pulmonary toilet: IS, cough deep breathe  3  Side lying and prone position  4  Current O2 needs 15 L midflow  2  COVID 19 moderate protocol started  1  Recommendations  1  No change  2  Medications  1  lipitor  2  Anticoagulation prophylaxis  3  Remdesivir day 5/5-completed  4  decadron dosing 0 1mg/kg BID 6/10  5  Diuretics held due to HUBER  6  Baricitinib 6/14  7  Convalsecent plasma NA  8  Antibiotics: doxycycline and rocphehin-PCT reduced from Peak 36 64 to 11 69  9  Check repeat PCT 11/26/21  10  Repeat Blood cultures NGTD-suspect culture on 11/19/21 was contaminant   3  HUBER on CKD IIB-huber resolved  1  ATN with covid 19 and volume overload  2  Nephrology following  4  Hyperkalemia-resolved  5  Elevated BNP  1  Cardiology workup outpatient      We will see 11/26/21, call with questions during the interim    Subjective:   Jeaneth Garcia was seen in bed upon entering the room  Reports his breathing and chest discomfort has improved today  + cough and dyspnea improving  Denies: feversor hemoptysi    Objective:         Vitals: Blood pressure (!) 109/49, pulse 67, temperature 98 °F (36 7 °C), temperature source Axillary, resp  rate 22, height 5' 4" (1 626 m), weight 103 kg (226 lb 13 7 oz), SpO2 90 %  , 15L midflow, Body mass index is 38 94 kg/m²      No intake or output data in the 24 hours ending 11/24/21 1359      Physical Exam  Gen: Awake, alert, oriented x 3, no acute distress  HEENT: Mucous membranes moist, no oral lesions, no thrush  NECK: no accessory muscle use, JVP not elevated  Cardiac: Regular, single S1, single S2, no murmurs, no rubs, no gallops  Lungs: decreased, clear breath sounds  Abdomen: normoactive bowel sounds, soft nontender, nondistended, no rebound or rigidity, no guarding  Extremities: no cyanosis, no clubbing, no edema    Labs: I have personally reviewed pertinent lab results  , CBC:   Lab Results   Component Value Date    WBC 15 05 (H) 11/24/2021    HGB 13 0 11/24/2021    HCT 39 6 11/24/2021    MCV 74 (L) 11/24/2021     11/24/2021    MCH 24 3 (L) 11/24/2021    MCHC 32 8 11/24/2021    RDW 15 9 (H) 11/24/2021    MPV 10 1 11/24/2021    NRBC 0 11/24/2021   , CMP:   Lab Results   Component Value Date    SODIUM 140 11/24/2021    K 4 6 11/24/2021     11/24/2021    CO2 24 11/24/2021    BUN 47 (H) 11/24/2021    CREATININE 1 91 (H) 11/24/2021    CALCIUM 8 4 11/24/2021    EGFR 35 11/24/2021     Imaging and other studies: none      Ying Kohler

## 2021-11-25 NOTE — PLAN OF CARE
Problem: PAIN - ADULT  Goal: Verbalizes/displays adequate comfort level or baseline comfort level  Description: Interventions:  - Encourage patient to monitor pain and request assistance  - Assess pain using appropriate pain scale  - Administer analgesics based on type and severity of pain and evaluate response  - Implement non-pharmacological measures as appropriate and evaluate response  - Consider cultural and social influences on pain and pain management  - Notify physician/advanced practitioner if interventions unsuccessful or patient reports new pain  Outcome: Progressing     Problem: INFECTION - ADULT  Goal: Absence or prevention of progression during hospitalization  Description: INTERVENTIONS:  - Assess and monitor for signs and symptoms of infection  - Monitor lab/diagnostic results  - Monitor all insertion sites, i e  indwelling lines, tubes, and drains  - Monitor endotracheal if appropriate and nasal secretions for changes in amount and color  - [place] appropriate cooling/warming therapies per order  - Administer medications as ordered  - Instruct and encourage patient and family to use good hand hygiene technique  - Identify and instruct in appropriate isolation precautions for identified infection/condition  Outcome: Progressing     Problem: SAFETY ADULT  Goal: Patient will remain free of falls  Description: INTERVENTIONS:  - Educate patient/family on patient safety including physical limitations  - Instruct patient to call for assistance with activity   - Consult OT/PT to assist with strengthening/mobility   - Keep Call bell within reach  - Keep bed low and locked with side rails adjusted as appropriate  - Keep care items and personal belongings within reach  - Initiate and maintain comfort rounds  - Make Fall Risk Sign visible to staff  - Offer Toileting   - Apply yellow socks and bracelet for high fall risk patients  - Consider moving patient to room near nurses station  Outcome: Progressing  Goal: Maintain or return to baseline ADL function  Description: INTERVENTIONS:  -  Assess patient's ability to carry out ADLs; assess patient's baseline for ADL function and identify physical deficits which impact ability to perform ADLs (bathing, care of mouth/teeth, toileting, grooming, dressing, etc )  - Assess/evaluate cause of self-care deficits   - Assess range of motion  - Assess patient's mobility; develop plan if impaired  - Assess patient's need for assistive devices and provide as appropriate  - Encourage maximum independence but intervene and supervise when necessary  - Involve family in performance of ADLs  - Assess for home care needs following discharge   - Consider OT consult to assist with ADL evaluation and planning for discharge  - Provide patient education as appropriate  Outcome: Progressing  Goal: Maintains/Returns to pre admission functional level  Description: INTERVENTIONS:  - Perform BMAT or MOVE assessment daily    - Set and communicate daily mobility goal to care team and patient/family/caregiver     - Collaborate with rehabilitation services on mobility goals if consulted  - Perform Range of Motion   - Out of bed for toileting  - Record patient progress and toleration of activity level   Outcome: Progressing     Problem: RESPIRATORY - ADULT  Goal: Achieves optimal ventilation and oxygenation  Description: INTERVENTIONS:  - Assess for changes in respiratory status  - Assess for changes in mentation and behavior  - Position to facilitate oxygenation and minimize respiratory effort  - Oxygen administered by appropriate delivery if ordered  - Initiate smoking cessation education as indicated  - Encourage broncho-pulmonary hygiene including cough, deep breathe, Incentive Spirometry  - Assess the need for suctioning and aspirate as needed  - Assess and instruct to report SOB or any respiratory difficulty  - Respiratory Therapy support as indicated  Outcome: Progressing     Problem: CARDIOVASCULAR - ADULT  Goal: Maintains optimal cardiac output and hemodynamic stability  Description: INTERVENTIONS:  - Monitor I/O, vital signs and rhythm  - Monitor for S/S and trends of decreased cardiac output  - Administer and titrate ordered vasoactive medications to optimize hemodynamic stability  - Assess quality of pulses, skin color and temperature  - Assess for signs of decreased coronary artery perfusion  - Instruct patient to report change in severity of symptoms  Outcome: Progressing     Problem: GENITOURINARY - ADULT  Goal: Maintains or returns to baseline urinary function  Description: INTERVENTIONS:  - Assess urinary function  - Encourage oral fluids to ensure adequate hydration if ordered  - Administer IV fluids as ordered to ensure adequate hydration  - Administer ordered medications as needed  - Offer frequent toileting  - Follow urinary retention protocol if ordered  Outcome: Progressing     Problem: METABOLIC, FLUID AND ELECTROLYTES - ADULT  Goal: Electrolytes maintained within normal limits  Description: INTERVENTIONS:  - Monitor labs and assess patient for signs and symptoms of electrolyte imbalances  - Administer electrolyte replacement as ordered  - Monitor response to electrolyte replacements, including repeat lab results as appropriate  - Instruct patient on fluid and nutrition as appropriate  Outcome: Progressing  Goal: Fluid balance maintained  Description: INTERVENTIONS:  - Monitor labs   - Monitor I/O and WT  - Instruct patient on fluid and nutrition as appropriate  - Assess for signs & symptoms of volume excess or deficit  Outcome: Progressing  Goal: Glucose maintained within target range  Description: INTERVENTIONS:  - Monitor Blood Glucose as ordered  - Assess for signs and symptoms of hyperglycemia and hypoglycemia  - Administer ordered medications to maintain glucose within target range  - Assess nutritional intake and initiate nutrition service referral as needed  Outcome: Progressing     Problem: MUSCULOSKELETAL - ADULT  Goal: Maintain or return mobility to safest level of function  Description: INTERVENTIONS:  - Assess patient's ability to carry out ADLs; assess patient's baseline for ADL function and identify physical deficits which impact ability to perform ADLs (bathing, care of mouth/teeth, toileting, grooming, dressing, etc )  - Assess/evaluate cause of self-care deficits   - Assess range of motion  - Assess patient's mobility  - Assess patient's need for assistive devices and provide as appropriate  - Encourage maximum independence but intervene and supervise when necessary  - Involve family in performance of ADLs  - Assess for home care needs following discharge   - Consider OT consult to assist with ADL evaluation and planning for discharge  - Provide patient education as appropriate  Outcome: Progressing     Problem: Potential for Falls  Goal: Patient will remain free of falls  Description: INTERVENTIONS:  - Educate patient/family on patient safety including physical limitations  - Instruct patient to call for assistance with activity   - Consult OT/PT to assist with strengthening/mobility   - Keep Call bell within reach  - Keep bed low and locked with side rails adjusted as appropriate  - Keep care items and personal belongings within reach  - Initiate and maintain comfort rounds  - Make Fall Risk Sign visible to staff  - Offer Toileting  - Apply yellow socks and bracelet for high fall risk patients  - Consider moving patient to room near nurses station  Outcome: Progressing     Problem: Prexisting or High Potential for Compromised Skin Integrity  Goal: Skin integrity is maintained or improved  Description: INTERVENTIONS:  - Identify patients at risk for skin breakdown  - Assess and monitor skin integrity  - Assess and monitor nutrition and hydration status  - Monitor labs   - Assess for incontinence   - Turn and reposition patient  - Assist with mobility/ambulation  - Relieve pressure over bony prominences  - Avoid friction and shearing  - Provide appropriate hygiene as needed including keeping skin clean and dry  - Evaluate need for skin moisturizer/barrier cream  - Collaborate with interdisciplinary team   - Patient/family teaching  - Consider wound care consult   Outcome: Progressing     Problem: MOBILITY - ADULT  Goal: Maintain or return to baseline ADL function  Description: INTERVENTIONS:  -  Assess patient's ability to carry out ADLs; assess patient's baseline for ADL function and identify physical deficits which impact ability to perform ADLs (bathing, care of mouth/teeth, toileting, grooming, dressing, etc )  - Assess/evaluate cause of self-care deficits   - Assess range of motion  - Assess patient's mobility; develop plan if impaired  - Assess patient's need for assistive devices and provide as appropriate  - Encourage maximum independence but intervene and supervise when necessary  - Involve family in performance of ADLs  - Assess for home care needs following discharge   - Consider OT consult to assist with ADL evaluation and planning for discharge  - Provide patient education as appropriate  Outcome: Progressing  Goal: Maintains/Returns to pre admission functional level  Description: INTERVENTIONS:  - Perform BMAT or MOVE assessment daily    - Set and communicate daily mobility goal to care team and patient/family/caregiver     - Collaborate with rehabilitation services on mobility goals if consulted  - Perform Range of Motion   - Out of bed for toileting  - Record patient progress and toleration of activity level   Outcome: Progressing

## 2021-11-25 NOTE — PROGRESS NOTES
Progress Note - Infectious Disease   Rea Pu 74 y o  male MRN: [id number]  Unit/Bed#: [place] [address] -01 Encounter: [id number]      Impression/Plan:    1  MSSA bacteremia   Present in 1/2 sets of admission blood cultures  Also growing staph epidermidis  It is difficult to call staph aureus a contaminant although this is a possibility  Given the high risk with inappropriate treatment, would treat as a true pathogen  Unclear source  Consider superimposed staph pneumonia in setting of sputum production and COVID 19 infection  TTE no vegetations  Has PiV in place, no central lines or intravascular devices  Repeat cultures no growth              -stop vancomycin   -start cefazolin 2g IV q8hr   -Plan for a 2 week course of IV antibiotics for transient bacteremia from 1st negative culture, end date 12/5/21                 2  Staph epidermidis bacteremia  1/2 sets admission blood cultures growing staph epidermidis  Likely contaminant               -no further treatment for this isolate     3  Sepsis-POA  Leukocytosis, tachypnea  Due to COVID 19 infection, staph aureus bacteremia  Consider possibility of superimposed bacterial pneumonia given productive cough and staph aureus in blood culture  Procalcitonin is elevated, but likely due to HUBER as trend corresponds to changes in creatinine and is now improving  Regardless, he has completed a 5 day course of Ceftriaxone and doxycycline               -cefazolin as above              -monitor fever curve, WBC count     4  Hypoxic respiratory failure  Secondary to COVID 19 infection  Remains on midflow O2, weaned to 13L              -continue COVID specific therapies              -wean O2 as able              -supportive care per primary     5  COVID-19 infection  Patient unvaccinated  Positive 11/17  Started on moderate pathway               -s/p 5 days remdesivir              -continue steroids, baractinib              -continue O2, wean as able     6   Type 2 diabetes mellitus, hyperglycemia  HbA1c 8%  Also with steroid induced hyperglycemia                -glycemic management per primary team     7  HUBER on CKD3  Creatinine increased after admission due to poor PO intake, infection  Now improved to baseline              -nephrology following     I have discussed the above management plan in detail with the primary service and patient  ID will follow  Antibiotics:  Vancomycin D3  Abx D4    Subjective:  Patient with headache today  No fever, chills, chest pain, abdominal pain  Still reports shortness of breath  Objective:  Vitals:  Temp:  [96 7 °F (35 9 °C)-97 3 °F (36 3 °C)] 97 2 °F (36 2 °C)  HR:  [69-96] 71  Resp:  [20-22] 21  BP: (130-176)/(60-70) 167/70  SpO2:  [94 %-99 %] 97 %  Temp (24hrs), Av 1 °F (36 2 °C), Min:96 7 °F (35 9 °C), Max:97 3 °F (36 3 °C)  Current: Temperature: (!) 97 2 °F (36 2 °C)    Physical Exam:   General Appearance: Tachypnea but appears non toxic   Throat: Oropharynx moist without lesions  Lungs:   Decreased breath sounds bilaterally, +tachypnea   Heart:  RRR; no murmur, rub or gallop   Abdomen:   Soft, non-tender, non-distended, positive bowel sounds  Extremities: No clubbing, cyanosis or edema   Skin: No new rashes or lesions  No draining wounds noted  Labs:    All pertinent labs and imaging studies were personally reviewed  Results from last 7 days   Lab Units 21  0613 21   WBC Thousand/uL 8 94 15 05* 11 93*   HEMOGLOBIN g/dL 11 9* 13 0 12 3   PLATELETS Thousands/uL 164 180 154     Results from last 7 days   Lab Units 21  0613 21  0452 21  0452 21  0446 21  0446 21  0548 21  0452 213 21  0443   SODIUM mmol/L 136  --  140  --  134*   < > 132*   < > 130*   POTASSIUM mmol/L 5 4*   < > 4 6   < > 5 1   < > 5 4*   < > 5 5*   CHLORIDE mmol/L 103   < > 104   < > 100   < > 100   < > 99*   CO2 mmol/L 26   < > 24   < > 22   < > 21   < > 20*   BUN mg/dL 47*   < > 47*   < > 53*   < > 55*   < > 40*   CREATININE mg/dL 1 98*   < > 1 91*   < > 2 12*   < > 2 46*   < > 2 71*   EGFR ml/min/1 73sq m 34   < > 35   < > 31   < > 26   < > 23   CALCIUM mg/dL 8 1*   < > 8 4   < > 8 2*   < > 8 3   < > 8 1*   AST U/L 120*  --   --   --   --   --  98*  --  84*   ALT U/L 80*  --   --   --   --   --  41   < > 42   ALK PHOS U/L 227*  --   --   --   --   --  151*   < > 117*    < > = values in this interval not displayed  Results from last 7 days   Lab Units 11/22/21  0548 11/21/21  0452 11/20/21  0443 11/19/21  0652   PROCALCITONIN ng/ml 11 69* 21 52* 36 64* 24 66*     Results from last 7 days   Lab Units 11/21/21  0452 11/20/21  0443 11/19/21  0652   CRP mg/L 195 4* 215 3* 167 6*     Results from last 7 days   Lab Units 11/19/21  0652   FERRITIN ng/mL 567*     Results from last 7 days   Lab Units 11/19/21  0652   D-DIMER QUANTITATIVE ug/ml FEU 1 21*       Micro:  Results from last 7 days   Lab Units 11/22/21  1801 11/22/21  1800 11/19/21  0652 11/19/21  0650   BLOOD CULTURE  No Growth at 48 hrs  No Growth at 48 hrs  Staphylococcus aureus*  Staphylococcus epidermidis* No Growth After 5 Days  GRAM STAIN RESULT   --   --  Gram positive cocci in clusters*  --        Imaging:  I have personally reviewed pertinent imaging study reports and images in PACS     CXR 11/19/21:  Bilateral groundglass opacities      Other Studies:   I have personally reviewed pertinent reports

## 2021-11-25 NOTE — CONSULTS
Vancomycin IV Pharmacy-to-Dose Consultation    Christian Werner is a 74 y o  male who has been receiving Vancomycin IV with management by the Pharmacy Consult service  Ancef has been started for MSSA bacteremia per infectious disease and vancomycin was stopped based on blood culture sensitivities  Therefore, pharmacy will sign off as consultants for vancomycin management  Thank you for this consult!     Marla Mena, Pharmacist

## 2021-11-25 NOTE — PLAN OF CARE
Problem: INFECTION - ADULT  Goal: Absence or prevention of progression during hospitalization  Description: INTERVENTIONS:  - Assess and monitor for signs and symptoms of infection  - Monitor lab/diagnostic results  - Monitor all insertion sites, i e  indwelling lines, tubes, and drains  - Monitor endotracheal if appropriate and nasal secretions for changes in amount and color  - [place] appropriate cooling/warming therapies per order  - Administer medications as ordered  - Instruct and encourage patient and family to use good hand hygiene technique  - Identify and instruct in appropriate isolation precautions for identified infection/condition  Outcome: Progressing     Problem: RESPIRATORY - ADULT  Goal: Achieves optimal ventilation and oxygenation  Description: INTERVENTIONS:  - Assess for changes in respiratory status  - Assess for changes in mentation and behavior  - Position to facilitate oxygenation and minimize respiratory effort  - Oxygen administered by appropriate delivery if ordered  - Initiate smoking cessation education as indicated  - Encourage broncho-pulmonary hygiene including cough, deep breathe, Incentive Spirometry  - Assess the need for suctioning and aspirate as needed  - Assess and instruct to report SOB or any respiratory difficulty  - Respiratory Therapy support as indicated  Outcome: Progressing     Problem: CARDIOVASCULAR - ADULT  Goal: Maintains optimal cardiac output and hemodynamic stability  Description: INTERVENTIONS:  - Monitor I/O, vital signs and rhythm  - Monitor for S/S and trends of decreased cardiac output  - Administer and titrate ordered vasoactive medications to optimize hemodynamic stability  - Assess quality of pulses, skin color and temperature  - Assess for signs of decreased coronary artery perfusion  - Instruct patient to report change in severity of symptoms  Outcome: Progressing     Problem: GENITOURINARY - ADULT  Goal: Maintains or returns to baseline urinary function  Description: INTERVENTIONS:  - Assess urinary function  - Encourage oral fluids to ensure adequate hydration if ordered  - Administer IV fluids as ordered to ensure adequate hydration  - Administer ordered medications as needed  - Offer frequent toileting  - Follow urinary retention protocol if ordered  Outcome: Progressing     Problem: METABOLIC, FLUID AND ELECTROLYTES - ADULT  Goal: Electrolytes maintained within normal limits  Description: INTERVENTIONS:  - Monitor labs and assess patient for signs and symptoms of electrolyte imbalances  - Administer electrolyte replacement as ordered  - Monitor response to electrolyte replacements, including repeat lab results as appropriate  - Instruct patient on fluid and nutrition as appropriate  Outcome: Progressing  Goal: Fluid balance maintained  Description: INTERVENTIONS:  - Monitor labs   - Monitor I/O and WT  - Instruct patient on fluid and nutrition as appropriate  - Assess for signs & symptoms of volume excess or deficit  Outcome: Progressing  Goal: Glucose maintained within target range  Description: INTERVENTIONS:  - Monitor Blood Glucose as ordered  - Assess for signs and symptoms of hyperglycemia and hypoglycemia  - Administer ordered medications to maintain glucose within target range  - Assess nutritional intake and initiate nutrition service referral as needed  Outcome: Progressing     Problem: MUSCULOSKELETAL - ADULT  Goal: Maintain or return mobility to safest level of function  Description: INTERVENTIONS:  - Assess patient's ability to carry out ADLs; assess patient's baseline for ADL function and identify physical deficits which impact ability to perform ADLs (bathing, care of mouth/teeth, toileting, grooming, dressing, etc )  - Assess/evaluate cause of self-care deficits   - Assess range of motion  - Assess patient's mobility  - Assess patient's need for assistive devices and provide as appropriate  - Encourage maximum independence but intervene and supervise when necessary  - Involve family in performance of ADLs  - Assess for home care needs following discharge   - Consider OT consult to assist with ADL evaluation and planning for discharge  - Provide patient education as appropriate  Outcome: Progressing

## 2021-11-25 NOTE — ASSESSMENT & PLAN NOTE
Results from last 7 days   Lab Units 11/25/21  0613 11/24/21  0452 11/23/21  0446 11/22/21  0548   CREATININE mg/dL 1 98* 1 91* 2 12* 2 48*   Previous lab shows patient's baseline creatinine roughly around 1 8-2  Peak Cr 2 70- Likely prerenal in setting of COVID, poor p o   Intake, mildly elevated CK  Nephrology following-creatinine has improved since admission  Nephrology gave 1 time dose of IV lasix 40 mg x1 today

## 2021-11-25 NOTE — ASSESSMENT & PLAN NOTE
57-year-old male- DM2, CAD, hypertension presented with shortness of breath and cough  Patient found to be septic, tachypnea, leukocytosis  COVID positive 11/17, unvaccinated  Checks x-ray shows multifocal pneumonia  Procalcitonin significantly elevated, concern for superimposed bacterial infection  Completed IV Rocephin and remdesivir x5 days  Currently on IV steroids day # 7/10, baricitnib day # 6/14  Encourage self prone in, incentive spirometry, ambulation as able  Was on non-rebreather and midflow at 15 L  Tapered off non-rebreather 11/23 and currently on 15 L mid flow  Tapered on   Seen and evaluated by pulmonology

## 2021-11-25 NOTE — PROGRESS NOTES
[address] [place] [address]  Progress Note - John Angeler 1955, 74 y o  male MRN: [id number]  Unit/Bed#: [facility] 87 216-01 Encounter: [id number]  Primary Care Provider: Mannie Sky MD   Date and time admitted to hospital: 11/19/2021  6:27 AM    * Acute hypoxemic respiratory failure due to COVID-19 [facility])  Assessment & Plan  13-year-old male- DM2, CAD, hypertension presented with shortness of breath and cough  Patient found to be septic, tachypnea, leukocytosis  COVID positive 11/17, unvaccinated  Checks x-ray shows multifocal pneumonia  Procalcitonin significantly elevated, concern for superimposed bacterial infection  Completed IV Rocephin and remdesivir x5 days  Currently on IV steroids day # 7/10, baricitnib day # 6/14  Encourage self prone in, incentive spirometry, ambulation as able  Was on non-rebreather and midflow at 15 L  Tapered off non-rebreather 11/23 and currently on 15 L mid flow  Seen and evaluated by pulmonology    Gram-positive bacteremia  Assessment & Plan  One of 2 blood cultures from 11/19 growing Gram-positive cocci: Staph aureus & Staph epidermis  Seen and evaluated by Infectious Disease- need to Staph aureus as a true pathogen given high risk with inappropriate treatment  Unclear source  Started on IV vancomycin 11/23  Repeat blood cultures -currently with no growth  Anticipate 2 week course of IV antibiotics if repeat cultures remain negative    Sepsis due to COVID-19 [facility])  Assessment & Plan  Leukocytosis and tachypneic on admission in setting of COVID pneumonia  Treatment as above    HUBER (acute kidney injury) [facility])  Assessment & Plan  Results from last 7 days   Lab Units 11/25/21  0613 11/24/21  0452 11/23/21  0446 11/22/21  0548   CREATININE mg/dL 1 98* 1 91* 2 12* 2 48*   Previous lab shows patient's baseline creatinine roughly around 1 8-2  Peak Cr 2 70- Likely prerenal in setting of COVID, poor p o   Intake, mildly elevated CK  Nephrology following-creatinine has improved since admission  Nephrology gave 1 time dose of IV lasix 40 mg x1 today    Essential hypertension  Assessment & Plan  Home regimen amlodipine 5 mg daily    Hx of CABG  Assessment & Plan  History of CAD status post CABG and stents  Not on aspirin or beta blockers  Continue Imdur and statin    Type 2 diabetes mellitus, without long-term current use of insulin (HCC)  Assessment & Plan  Lab Results   Component Value Date    HGBA1C 8 0 (H) 2021   Improving appetite, hyperglycemia secondary to steroids  Increased Lantus to 30 units hs, humalog 10 units with meals  Still with hyperglycemia in the 400s    - increased to humalog 15 units TID --> 20 units TID   - increased to lantus 35 units HS --> 40 units HS  Blood glucose in the 300s today - will advance insulin dosage    Hyperkalemia-resolved as of 2021  Assessment & Plan  Hyperkalemia in the setting of HUBER  Now resolved        VTE Pharmacologic Prophylaxis:   Pharmacologic: Heparin  Mechanical VTE Prophylaxis in Place: Yes    Discharge Plan: With need for continued inpatient stay for COVID in oxygen requirements    Discussions with Specialists or Other Care Team Provider:  Nursing    Education and Discussions with Family / Patient:  Patient, son via telephone    Time Spent for Care: 20 minutes  More than 50% of total time spent on counseling and coordination of care as described above  Current Length of Stay: 6 day(s)  Current Patient Status: Inpatient   Code Status: Level 1 - Full Code    Subjective:   Resting in bed  Complains of a headache today  Attempting to taper oxygen today  Reports he is urinating okay  Has not had a BM recently -- requesting stool softner       Objective:     Vitals:   Temp (24hrs), Av 1 °F (36 2 °C), Min:96 7 °F (35 9 °C), Max:97 3 °F (36 3 °C)    Temp:  [96 7 °F (35 9 °C)-97 3 °F (36 3 °C)] 97 2 °F (36 2 °C)  HR:  [69-96] 71  Resp:  [20-22] 21  BP: (130-176)/(60-70) 167/70  SpO2:  [94 %-99 %] 97 %  Body mass index is 38 67 kg/m²  Input and Output Summary (last 24 hours): Intake/Output Summary (Last 24 hours) at 11/25/2021 1451  Last data filed at 11/25/2021 0900  Gross per 24 hour   Intake 240 ml   Output 800 ml   Net -560 ml       Physical Exam:     Physical Exam  Vitals and nursing note reviewed  Constitutional:       General: He is not in acute distress  Appearance: Normal appearance  He is obese  He is not ill-appearing, toxic-appearing or diaphoretic  HENT:      Head: Normocephalic and atraumatic  Eyes:      General: No scleral icterus  Cardiovascular:      Rate and Rhythm: Normal rate and regular rhythm  Pulmonary:      Effort: Pulmonary effort is normal  No respiratory distress  Breath sounds: Normal breath sounds  No stridor  No wheezing or rhonchi  Comments: On 13 L midflow  Abdominal:      General: Bowel sounds are normal  There is no distension  Palpations: Abdomen is soft  There is no mass  Tenderness: There is no abdominal tenderness  Hernia: No hernia is present  Musculoskeletal:         General: No swelling  Cervical back: Neck supple  Skin:     General: Skin is warm and dry  Neurological:      Mental Status: He is alert and oriented to person, place, and time  Mental status is at baseline  Psychiatric:         Mood and Affect: Mood normal          Behavior: Behavior normal          Additional Data:     Labs:    Results from last 7 days   Lab Units 11/25/21  0613 11/24/21  0452 11/24/21  0452   WBC Thousand/uL 8 94   < > 15 05*   HEMOGLOBIN g/dL 11 9*   < > 13 0   HEMATOCRIT % 36 4*   < > 39 6   PLATELETS Thousands/uL 164   < > 180   NEUTROS PCT %  --   --  80*   LYMPHS PCT %  --   --  6*   MONOS PCT %  --   --  8   EOS PCT %  --   --  0    < > = values in this interval not displayed       Results from last 7 days   Lab Units 11/25/21  0613   POTASSIUM mmol/L 5 4*   CHLORIDE mmol/L 103   CO2 mmol/L 26   BUN mg/dL 47*   CREATININE mg/dL 1 98* CALCIUM mg/dL 8 1*   ALK PHOS U/L 227*   ALT U/L 80*   AST U/L 120*     Results from last 7 days   Lab Units 11/19/21  0652   INR  1 08       * I Have Reviewed All Lab Data Listed Above  * Additional Pertinent Lab Tests Reviewed: Luis Antonio 64 Admission Reviewed    Imaging:    Imaging Reports Reviewed Today Include:   Imaging Personally Reviewed by Myself Includes:      Recent Cultures (last 7 days):     Results from last 7 days   Lab Units 11/22/21  1801 11/22/21  1800 11/19/21  0652 11/19/21  0650   BLOOD CULTURE  No Growth at 48 hrs  No Growth at 48 hrs  Staphylococcus aureus*  Staphylococcus epidermidis* No Growth After 5 Days     GRAM STAIN RESULT   --   --  Gram positive cocci in clusters*  --        Last 24 Hours Medication List:   Current Facility-Administered Medications   Medication Dose Route Frequency Provider Last Rate    amLODIPine  5 mg Oral Daily Madge Romberg, CRNP      atorvastatin  40 mg Oral HS Yfn Hou MD      Baricitinib  2 mg Oral Q24H Jasper Garcia MD      cefazolin  2,000 mg Intravenous Q8H Jigna Mcleod MD 2,000 mg (11/25/21 1250)    dexamethasone  0 1 mg/kg Intravenous Q12H [facility] Genora SandraCIELO jimenez 11 1 mg (11/25/21 7250)    furosemide  40 mg Intravenous Once Leticia Winkler MD      guaiFENesin  600 mg Oral Q12H Luis Don MD      heparin (porcine)  5,000 Units Subcutaneous [facility] Gail Awad MD      hydrocodone-chlorpheniramine polistirex  5 mL Oral Q12H PRN Christine Hou MD      hydrOXYzine HCL  25 mg Oral Q6H PRN CIELO Xie      insulin glargine  35 Units Subcutaneous HS Mable Lieberman PA-C      insulin lispro  1-5 Units Subcutaneous TID AC Yfn Hou MD      insulin lispro  15 Units Subcutaneous TID With Meals Anna Ramirez PA-C      isosorbide mononitrate  60 mg Oral QAM Madge Romberg, CRNP      loratadine  10 mg Oral Daily Gail Awad MD      melatonin  6 mg Oral HS CIELO Xie      oxyCODONE 5 mg Oral Q4H PRN Brennan Menendez MD      pantoprazole  40 mg Oral Early Morning Brennan Menendez MD      phenol  1 spray Mouth/Throat Q2H PRN Brennan Menendez MD      sodium bicarbonate  650 mg Oral BID after meals Mookie Maxin, CRNP      sodium chloride  1 spray Each Nare Q1H PRN Andres Huber MD      tiZANidine  4 mg Oral Q8H PRN Brennan Menendez MD          Today, Patient Was Seen By: Kel Healy PA-C    ** Please Note: This note has been constructed using a voice recognition system   **

## 2021-11-25 NOTE — ASSESSMENT & PLAN NOTE
One of 2 blood cultures from 11/19 growing Gram-positive cocci: Staph aureus & Staph epidermis  Seen and evaluated by Infectious Disease- need to Staph aureus as a true pathogen given high risk with inappropriate treatment  Unclear source     Started on IV vancomycin 11/23  Repeat blood cultures -currently with no growth  Anticipate 2 week course of IV antibiotics if repeat cultures remain negative

## 2021-11-25 NOTE — PROGRESS NOTES
NEPHROLOGY PROGRESS NOTE   Sherly Bun 74 y o  male MRN: [id number]  Unit/Bed#: [facility] 87 216-01 Encounter: [id number]  Reason for Consult: HUBER    ASSESSMENT/PLAN:  1  Acute Kidney Injury, POA- secondary to ATN from COVID  - creatinine now at baseline  - imaging: no hydronephrosis  - holding diuretics for now  - continue to monitor for need to restart diuretics  2  Chronic Kidney Disease stage III- Baseline creatinine 1 8-2 1  Follows with [address] nephrology  3  Hypertension- BP acceptable overall  4  Metabolic Acidosis- continue sodium bicarbonate tablets  5  Hyperkalemia- mildly elevated this morning  - low K diet  - recommend repeating BMP in AM and if remains elevated tomorrow, give lasix x1 dose  - medical management with kayexalate and insulin/dextrose if worsens  6  CHF- holding diuretics  - monitor for need for restarting lasix  - weight down to 103kg (227 lbs)  - making urine  7  COVID 19 infection- per primary team    Disposition:  No changes from a renal standpoint  Repeat AM BMP and if K remains elevated, would recommend giving lasix x1  SUBJECTIVE:  States his breathing is now okay but was worse earlier today  Has swelling at baseline  Denies acute complaints otherwise  [birth year] today! OBJECTIVE:  Current Weight: Weight - Scale: 102 kg (225 lb 4 8 oz)  Vitals:    11/25/21 0600 11/25/21 0755 11/25/21 1021 11/25/21 1108   BP:  (!) 176/68 130/64    BP Location:  Right arm Right arm    Pulse:  80 96    Resp:  22     Temp:  (!) 97 2 °F (36 2 °C)     TempSrc:  Oral     SpO2:  95% 94% 99%   Weight: 102 kg (225 lb 4 8 oz)      Height:           Intake/Output Summary (Last 24 hours) at 11/25/2021 1135  Last data filed at 11/25/2021 0900  Gross per 24 hour   Intake 720 ml   Output 800 ml   Net -80 ml     Due to COVID 19 infection, I examined the patient through the window and spoke with him over the phone    General: NAD  Skin: no rash noted  Respiratory: no labored breathing  Cardiac: regular rate on monitor  Extremities: covered  GI: not distended  Neuro: alert awake  Psych: mood and affect appropriate    Medications:    Current Facility-Administered Medications:     acetaminophen (TYLENOL) tablet 650 mg, 650 mg, Oral, Q6H PRN, Radha Cohen MD, 650 mg at 11/25/21 0827    amLODIPine (NORVASC) tablet 5 mg, 5 mg, Oral, Daily, Nisha Fay, VIRANP, 5 mg at 11/25/21 7630    atorvastatin (LIPITOR) tablet 40 mg, 40 mg, Oral, HS, Radha Cohen MD, 40 mg at 11/24/21 2243    Baricitinib (OLUMIANT) (COVID EUA) tablet 2 mg, 2 mg, Oral, Q24H, Thao Lagos MD    dexamethasone (DECADRON) 11 1 mg in sodium chloride 0 9 % 50 mL IVPB, 0 1 mg/kg, Intravenous, Q12H [facility], CIELO Land, Last Rate: 100 mL/hr at 11/25/21 0811, 11 1 mg at 11/25/21 0811    guaiFENesin (MUCINEX) 12 hr tablet 600 mg, 600 mg, Oral, Q12H [facility], Radha Cohen MD, 600 mg at 11/25/21 0811    heparin (porcine) subcutaneous injection 5,000 Units, 5,000 Units, Subcutaneous, Q8H [facility], Radha Cohen MD, 5,000 Units at 11/25/21 0614    hydrocodone-chlorpheniramine polistirex (TUSSIONEX) ER suspension 5 mL, 5 mL, Oral, Q12H PRN, Radha Cohen MD, 5 mL at 11/24/21 2229    hydrOXYzine HCL (ATARAX) tablet 25 mg, 25 mg, Oral, Q6H PRN, CIELO Bustos, 25 mg at 11/24/21 2244    insulin glargine (LANTUS) subcutaneous injection 35 Units 0 35 mL, 35 Units, Subcutaneous, HS, Mable Lieberman PA-C, 35 Units at 11/24/21 2244    insulin lispro (HumaLOG) 100 units/mL subcutaneous injection 1-5 Units, 1-5 Units, Subcutaneous, TID AC, 3 Units at 11/25/21 0812 **AND** Fingerstick Glucose (POCT), , , TID AC, Yfn Garcia MD    insulin lispro (HumaLOG) 100 units/mL subcutaneous injection 15 Units, 15 Units, Subcutaneous, TID With Meals, Vicky Peter PA-C, 15 Units at 11/25/21 0812    isosorbide mononitrate (IMDUR) 24 hr tablet 60 mg, 60 mg, Oral, QAM, CIELO [place], 60 mg at 11/25/21 0811    loratadine (CLARITIN) tablet 10 mg, 10 mg, Oral, Daily, Samantha Healy MD, 10 mg at 11/25/21 0811    melatonin tablet 6 mg, 6 mg, Oral, HS, CIELO Osuna, 6 mg at 11/24/21 2244    oxyCODONE (ROXICODONE) IR tablet 5 mg, 5 mg, Oral, Q4H PRN, Samantha Healy MD, 5 mg at 11/25/21 1019    pantoprazole (PROTONIX) EC tablet 40 mg, 40 mg, Oral, Early Morning, Samantha Healy MD, 40 mg at 11/25/21 0614    phenol (CHLORASEPTIC) 1 4 % mucosal liquid 1 spray, 1 spray, Mouth/Throat, Q2H PRN, Samantha Healy MD, 1 spray at 11/20/21 1716    sodium bicarbonate tablet 650 mg, 650 mg, Oral, BID after meals, CIELO Wilburn, 650 mg at 11/25/21 0811    sodium chloride (OCEAN) 0 65 % nasal spray 1 spray, 1 spray, Each Nare, Q1H PRN, Samantha Healy MD    tiZANidine (ZANAFLEX) tablet 4 mg, 4 mg, Oral, Q8H PRN, Samantha Healy MD, 4 mg at 11/24/21 0809    vancomycin (VANCOCIN) 1500 mg in sodium chloride 0 9% 250 mL IVPB, 20 mg/kg (Adjusted), Intravenous, Q24H, Mable Lieberman PA-C, Last Rate: 0 mL/hr at 11/24/21 1630, Restarted at 11/24/21 1811    Laboratory Results:  Results from last 7 days   Lab Units 11/25/21  0613 11/24/21  0452 11/23/21  0446 11/22/21  0548 11/21/21  0452 11/20/21  0443 11/19/21  0652   WBC Thousand/uL 8 94 15 05* 11 93* 17 07* 14 47* 17 84* 19 70*   HEMOGLOBIN g/dL 11 9* 13 0 12 3 12 8 12 6 12 6 13 0   HEMATOCRIT % 36 4* 39 6 35 1* 40 5 39 4 39 7 41 6   PLATELETS Thousands/uL 164 180 154 152 125* 113* 106*   POTASSIUM mmol/L 5 4* 4 6 5 1 4 8 5 4* 5 5* 5 4*   CHLORIDE mmol/L 103 104 100 101 100 99* 98*   CO2 mmol/L 26 24 22 21 21 20* 22   BUN mg/dL 47* 47* 53* 62* 55* 40* 26*   CREATININE mg/dL 1 98* 1 91* 2 12* 2 48* 2 46* 2 71* 2 13*   CALCIUM mg/dL 8 1* 8 4 8 2* 8 7 8 3 8 1* 8 2*   MAGNESIUM mg/dL  --   --   --   --   --   --  1 6     I have personally reviewed the blood work as stated above and in my note

## 2021-11-25 NOTE — ASSESSMENT & PLAN NOTE
Lab Results   Component Value Date    HGBA1C 8 0 (H) 11/04/2021   Improving appetite, hyperglycemia secondary to steroids  Increased Lantus to 30 units hs, humalog 10 units with meals  Still with hyperglycemia in the 400s 11/23   - increased to humalog 15 units TID --> 20 units TID   - increased to lantus 35 units HS --> 40 units HS  Blood glucose in the 300s today - will advance insulin dosage

## 2021-11-26 NOTE — OCCUPATIONAL THERAPY NOTE
Occupational Therapy Progress Note     Patient Name: aFtou Ireland  DVLGT'F Date: 11/26/2021  Problem List  Principal Problem:    [facility] [place] [address])  Active Problems:    Peripheral vascular disease, unspecified ([facility] )    Type 2 diabetes mellitus, without long-term current use of insulin (HCC)    Hx of CABG    Essential hypertension    HUBER (acute kidney injury) ([place] [id number] )    [facility] [place] [address])    Gram-positive bacteremia    Hyperkalemia            11/26/21 1405   OT Last Visit   OT Visit Date 11/26/21   Note Type   Note Type Treatment   Restrictions/Precautions   Weight Bearing Precautions Per Order No   Other Precautions Contact/isolation; Airborne/isolation;O2;Telemetry; Fall Risk  (6L midflow)   General   Response to Previous Treatment Patient with no complaints from previous session   Pain Assessment   Pain Assessment Tool 0-10   Pain Score No Pain   ADL   Where Assessed Edge of bed   Eating Assistance 7  Independent   Eating Deficit Beverage management   Grooming Assistance 5  Supervision/Setup   Grooming Deficit Setup; Increased time to complete; Teeth care   UB Bathing Assistance 5  Supervision/Setup   UB Bathing Deficit Setup; Increased time to complete   LB Bathing Assistance 4  Minimal Assistance   LB Bathing Deficit Setup;Steadying;Verbal cueing;Supervision/safety; Increased time to complete   UB Dressing Assistance 5  Supervision/Setup   UB Dressing Deficit Setup; Increased time to complete   LB Dressing Assistance 4  Minimal Assistance   LB Dressing Deficit Setup; Increased time to complete;Verbal cueing;Steadying; Requires assistive device for steadying   Functional Standing Tolerance   Time ~5 mins    Activity  Pt was able to stand for a total of ~5 min during LB bathing and dressing demonstrating fair- to poor+ dynamic standing balance      Comments w/ RW   Bed Mobility   Rolling R 6  Modified independent   Additional items Bedrails   Supine to Sit 6  Modified independent   Additional items Bedrails   Additional Comments Upon arrival, pt supine in bed and agreeable to therapy  At the end of session, pt seated EOB w/ call bell and tray within reach  All needs met, no further questions or concerns for OT  Transfers   Sit to Stand 5  Supervision   Additional items Increased time required;Verbal cues   Stand to Sit 5  Supervision   Additional items Increased time required;Verbal cues   Additional Comments Pt requiring cues for safe techniques and hand placement on RW  Pt felt dizzy during stance, pt then seated EOB /65  Pt reported symptoms resolved and felt fatigued  ,   Cognition   Overall Cognitive Status [facility]   Arousal/Participation Alert; Cooperative   Attention Within functional limits   Orientation Level Oriented X4   Memory Within functional limits   Following Commands Follows one step commands without difficulty   Comments Pt pleasant and cooperative throughout session  Pt very appreciative to have had an ADL session this afternoon  Activity Tolerance   Activity Tolerance Patient limited by fatigue;Treatment limited secondary to medical complications (Comment)   Medical Staff Made Aware Jessica Arnold RN    Assessment   Assessment Pt was seen for an OT treatment session focusing on functional transfers, dynamic standing balance, and ADLs  Pt's name, , and wristband were confirmed prior to session  Pt supine in bed at the beginning of the session and agreeable to OT session  Pt on 6L mid flow ranging from 85-99%  Pt was educated on breathing techniques throughout session to aid in increasing SPO2  Pt washed his face, head, and UB w/ S for set-up and increased time  Pt doffed/donned hospital gown w/ S for set-up  Pt then performed LB dressing as he doffed his underwear and pants w/ Min Ax1 w/ RW  Pt requiring Min A/CGA in standing, cues for pacing, and occasional steadying   Pt performed LB bathing requiring S while seated and Min A x1 w/ RW for occasional steadying and cues for pacing  Pt w/ multiple seated rest breaks during ADL routine 2* CHILDS and decrease endurance  Pt performed sit< stand transfers at S level for safety  Pt was able to stand for a total of ~5 min during LB bathing and dressing demonstrating fair- to poor+ dynamic standing balance  During stance, pt reported feeling dizzy  Pt seated EOB, /65 and HR 88 bpm  Pt performed donning of underwear, pants, and socks at a Min A w/ RW for occasional steadying and for safety  Pt performed oral hygiene w/ S for set-up seated EOB as pt c/o fatigue after bathing and dressing  At the end of session, pt was seated in chair with call bell and tray within reach  All needs met and pt with no further questions or concerns for OT  OT recommendations are for pt to receive post-acute rehabilitation vs HHOT pending progress to maximize on pt's independence with: ADLs, IADLs, functional transfers/mobility, increase endurance, and increased safety awareness  Plan   Treatment Interventions ADL retraining;Functional transfer training; Endurance training;Patient/family training;Equipment evaluation/education;Continued evaluation; Energy conservation; Activityengagement; Compensatory technique education   Goal Expiration Date 12/06/21   OT Treatment Day 1   OT Frequency 3-5x/wk   Recommendation   OT Discharge Recommendation Post acute rehabilitation services  (vs HHOT pending progress)   OT - OK to Discharge Yes  (when medically cleared)   Additional Comments  The patient's raw score on the AM-PAC Daily Activity inpatient short form is 19, standardized score is 40 22, greater than 39 4  Patients at this level are likely to benefit from discharge to home, however, pt w/ CHILDS and decreased activity tolerance  Please refer to the recommendation of the Occupational Therapist for safe discharge planning     AM-PAC Daily Activity Inpatient   Lower Body Dressing 3   Bathing 3   Toileting 3   Upper Body Dressing 3   Grooming 3 Eating 4   Daily Activity Raw Score 19   Daily Activity Standardized Score (Calc for Raw Score >=11) 40 22   AM-PAC Applied Cognition Inpatient   Following a Speech/Presentation 4   Understanding Ordinary Conversation 4   Taking Medications 3   Remembering Where Things Are Placed or Put Away 3   Remembering List of 4-5 Errands 3   Taking Care of Complicated Tasks 3   Applied Cognition Raw Score 20   Applied Cognition Standardized Score 41 76         Daysi Turpin, [facility]

## 2021-11-26 NOTE — PLAN OF CARE
Problem: PAIN - ADULT  Goal: Verbalizes/displays adequate comfort level or baseline comfort level  Description: Interventions:  - Encourage patient to monitor pain and request assistance  - Assess pain using appropriate pain scale  - Administer analgesics based on type and severity of pain and evaluate response  - Implement non-pharmacological measures as appropriate and evaluate response  - Consider cultural and social influences on pain and pain management  - Notify physician/advanced practitioner if interventions unsuccessful or patient reports new pain  Outcome: Progressing     Problem: INFECTION - ADULT  Goal: Absence or prevention of progression during hospitalization  Description: INTERVENTIONS:  - Assess and monitor for signs and symptoms of infection  - Monitor lab/diagnostic results  - Monitor all insertion sites, i e  indwelling lines, tubes, and drains  - Monitor endotracheal if appropriate and nasal secretions for changes in amount and color  - [place] appropriate cooling/warming therapies per order  - Administer medications as ordered  - Instruct and encourage patient and family to use good hand hygiene technique  - Identify and instruct in appropriate isolation precautions for identified infection/condition  Outcome: Progressing     Problem: SAFETY ADULT  Goal: Patient will remain free of falls  Description: INTERVENTIONS:  - Educate patient/family on patient safety including physical limitations  - Instruct patient to call for assistance with activity   - Consult OT/PT to assist with strengthening/mobility   - Keep Call bell within reach  - Keep bed low and locked with side rails adjusted as appropriate  - Keep care items and personal belongings within reach  - Initiate and maintain comfort rounds  - Make Fall Risk Sign visible to staff  - Offer Toileting every  Hours, in advance of need  - Initiate/Maintain alarm  - Obtain necessary fall risk management equipment:   - Apply yellow socks and bracelet for high fall risk patients  - Consider moving patient to room near nurses station  Outcome: Progressing  Goal: Maintain or return to baseline ADL function  Description: INTERVENTIONS:  -  Assess patient's ability to carry out ADLs; assess patient's baseline for ADL function and identify physical deficits which impact ability to perform ADLs (bathing, care of mouth/teeth, toileting, grooming, dressing, etc )  - Assess/evaluate cause of self-care deficits   - Assess range of motion  - Assess patient's mobility; develop plan if impaired  - Assess patient's need for assistive devices and provide as appropriate  - Encourage maximum independence but intervene and supervise when necessary  - Involve family in performance of ADLs  - Assess for home care needs following discharge   - Consider OT consult to assist with ADL evaluation and planning for discharge  - Provide patient education as appropriate  Outcome: Progressing  Goal: Maintains/Returns to pre admission functional level  Description: INTERVENTIONS:  - Perform BMAT or MOVE assessment daily    - Set and communicate daily mobility goal to care team and patient/family/caregiver  - Collaborate with rehabilitation services on mobility goals if consulted  - Perform Range of Motion  times a day  - Reposition patient every  hours  - Dangle patient  times a day  - Stand patient  times a day  - Ambulate patient  times a day  - Out of bed to chair  times a day   - Out of bed for meals  times a day  - Out of bed for toileting  - Record patient progress and toleration of activity level   Outcome: Progressing     Problem: Knowledge Deficit  Goal: Patient/family/caregiver demonstrates understanding of disease process, treatment plan, medications, and discharge instructions  Description: Complete learning assessment and assess knowledge base    Interventions:  - Provide teaching at level of understanding  - Provide teaching via preferred learning methods  Outcome: Progressing     Problem: RESPIRATORY - ADULT  Goal: Achieves optimal ventilation and oxygenation  Description: INTERVENTIONS:  - Assess for changes in respiratory status  - Assess for changes in mentation and behavior  - Position to facilitate oxygenation and minimize respiratory effort  - Oxygen administered by appropriate delivery if ordered  - Initiate smoking cessation education as indicated  - Encourage broncho-pulmonary hygiene including cough, deep breathe, Incentive Spirometry  - Assess the need for suctioning and aspirate as needed  - Assess and instruct to report SOB or any respiratory difficulty  - Respiratory Therapy support as indicated  Outcome: Progressing     Problem: CARDIOVASCULAR - ADULT  Goal: Maintains optimal cardiac output and hemodynamic stability  Description: INTERVENTIONS:  - Monitor I/O, vital signs and rhythm  - Monitor for S/S and trends of decreased cardiac output  - Administer and titrate ordered vasoactive medications to optimize hemodynamic stability  - Assess quality of pulses, skin color and temperature  - Assess for signs of decreased coronary artery perfusion  - Instruct patient to report change in severity of symptoms  Outcome: Progressing     Problem: GENITOURINARY - ADULT  Goal: Maintains or returns to baseline urinary function  Description: INTERVENTIONS:  - Assess urinary function  - Encourage oral fluids to ensure adequate hydration if ordered  - Administer IV fluids as ordered to ensure adequate hydration  - Administer ordered medications as needed  - Offer frequent toileting  - Follow urinary retention protocol if ordered  Outcome: Progressing     Problem: METABOLIC, FLUID AND ELECTROLYTES - ADULT  Goal: Electrolytes maintained within normal limits  Description: INTERVENTIONS:  - Monitor labs and assess patient for signs and symptoms of electrolyte imbalances  - Administer electrolyte replacement as ordered  - Monitor response to electrolyte replacements, including repeat lab results as appropriate  - Instruct patient on fluid and nutrition as appropriate  Outcome: Progressing  Goal: Fluid balance maintained  Description: INTERVENTIONS:  - Monitor labs   - Monitor I/O and WT  - Instruct patient on fluid and nutrition as appropriate  - Assess for signs & symptoms of volume excess or deficit  Outcome: Progressing  Goal: Glucose maintained within target range  Description: INTERVENTIONS:  - Monitor Blood Glucose as ordered  - Assess for signs and symptoms of hyperglycemia and hypoglycemia  - Administer ordered medications to maintain glucose within target range  - Assess nutritional intake and initiate nutrition service referral as needed  Outcome: Progressing     Problem: MUSCULOSKELETAL - ADULT  Goal: Maintain or return mobility to safest level of function  Description: INTERVENTIONS:  - Assess patient's ability to carry out ADLs; assess patient's baseline for ADL function and identify physical deficits which impact ability to perform ADLs (bathing, care of mouth/teeth, toileting, grooming, dressing, etc )  - Assess/evaluate cause of self-care deficits   - Assess range of motion  - Assess patient's mobility  - Assess patient's need for assistive devices and provide as appropriate  - Encourage maximum independence but intervene and supervise when necessary  - Involve family in performance of ADLs  - Assess for home care needs following discharge   - Consider OT consult to assist with ADL evaluation and planning for discharge  - Provide patient education as appropriate  Outcome: Progressing     Problem: GASTROINTESTINAL - ADULT  Goal: Maintains or returns to baseline bowel function  Description: INTERVENTIONS:  - Assess bowel function  - Encourage oral fluids to ensure adequate hydration  - Administer IV fluids if ordered to ensure adequate hydration  - Administer ordered medications as needed  - Encourage mobilization and activity  - Consider nutritional services referral to assist patient with adequate nutrition and appropriate food choices  Outcome: Progressing     Problem: SKIN/TISSUE INTEGRITY - ADULT  Goal: Skin Integrity remains intact(Skin Breakdown Prevention)  Description: Assess:  -Perform Linus assessment every   -Clean and moisturize skin every   -Inspect skin when repositioning, toileting, and assisting with ADLS  -Assess under medical devices such as  every   -Assess extremities for adequate circulation and sensation     Bed Management:  -Have minimal linens on bed & keep smooth, unwrinkled  -Change linens as needed when moist or perspiring  -Avoid sitting or lying in one position for more than  hours while in bed  -Keep HOB at degrees     Toileting:  -Offer bedside commode  -Assess for incontinence every   -Use incontinent care products after each incontinent episode such as     Activity:  -Mobilize patient  times a day  -Encourage activity and walks on unit  -Encourage or provide ROM exercises   -Turn and reposition patient every  Hours  -Use appropriate equipment to lift or move patient in bed  -Instruct/ Assist with weight shifting every  when out of bed in chair  -Consider limitation of chair time  hour intervals    Skin Care:  -Avoid use of baby powder, tape, friction and shearing, hot water or constrictive clothing  -Relieve pressure over bony prominences using   -Do not massage red bony areas    Next Steps:  -Teach patient strategies to minimize risks such as    -Consider consults to  interdisciplinary teams such as   Outcome: Progressing     Problem: Potential for Falls  Goal: Patient will remain free of falls  Description: INTERVENTIONS:  - Educate patient/family on patient safety including physical limitations  - Instruct patient to call for assistance with activity   - Consult OT/PT to assist with strengthening/mobility   - Keep Call bell within reach  - Keep bed low and locked with side rails adjusted as appropriate  - Keep care items and personal belongings within reach  - Initiate and maintain comfort rounds  - Make Fall Risk Sign visible to staff  - Offer Toileting every  Hours, in advance of need  - Initiate/Maintain alarm  - Obtain necessary fall risk management equipment:   - Apply yellow socks and bracelet for high fall risk patients  - Consider moving patient to room near nurses station  Outcome: Progressing     Problem: Prexisting or High Potential for Compromised Skin Integrity  Goal: Skin integrity is maintained or improved  Description: INTERVENTIONS:  - Identify patients at risk for skin breakdown  - Assess and monitor skin integrity  - Assess and monitor nutrition and hydration status  - Monitor labs   - Assess for incontinence   - Turn and reposition patient  - Assist with mobility/ambulation  - Relieve pressure over bony prominences  - Avoid friction and shearing  - Provide appropriate hygiene as needed including keeping skin clean and dry  - Evaluate need for skin moisturizer/barrier cream  - Collaborate with interdisciplinary team   - Patient/family teaching  - Consider wound care consult   Outcome: Progressing     Problem: MOBILITY - ADULT  Goal: Maintain or return to baseline ADL function  Description: INTERVENTIONS:  -  Assess patient's ability to carry out ADLs; assess patient's baseline for ADL function and identify physical deficits which impact ability to perform ADLs (bathing, care of mouth/teeth, toileting, grooming, dressing, etc )  - Assess/evaluate cause of self-care deficits   - Assess range of motion  - Assess patient's mobility; develop plan if impaired  - Assess patient's need for assistive devices and provide as appropriate  - Encourage maximum independence but intervene and supervise when necessary  - Involve family in performance of ADLs  - Assess for home care needs following discharge   - Consider OT consult to assist with ADL evaluation and planning for discharge  - Provide patient education as appropriate  Outcome: Progressing  Goal: Maintains/Returns to pre admission functional level  Description: INTERVENTIONS:  - Perform BMAT or MOVE assessment daily    - Set and communicate daily mobility goal to care team and patient/family/caregiver  - Collaborate with rehabilitation services on mobility goals if consulted  - Perform Range of Motion  times a day  - Reposition patient every  hours    - Dangle patient  times a day  - Stand patient  times a day  - Ambulate patient  times a day  - Out of bed to chair  times a day   - Out of bed for meals  times a day  - Out of bed for toileting  - Record patient progress and toleration of activity level   Outcome: Progressing

## 2021-11-26 NOTE — ASSESSMENT & PLAN NOTE
52-year-old male- DM2, CAD, hypertension presented with shortness of breath and cough  Patient found to be septic, tachypnea, leukocytosis  COVID positive 11/17, unvaccinated  Checks x-ray shows multifocal pneumonia  Procalcitonin significantly elevated, concern for superimposed bacterial infection  Completed IV Rocephin and remdesivir x5 days  Currently on IV steroids day # 8/10, baricitnib day # 7/14  Pulmonology recommending a taper once completed 10 days   -Start taper at 10 mg and taper by 2 mg every 2 days   Encourage self prone in, incentive spirometry, ambulation as able  Was on non-rebreather and midflow at 15 L  Tapered off non-rebreather 11/23 and currently on 15 L mid flow  Tapering oxygen today    Patient was on 10 L mid flow and tolerating  Now currently on 6 L mid flow-continue monitor  Seen and evaluated by pulmonology

## 2021-11-26 NOTE — PROGRESS NOTES
Progress Note - Nephrology   Rea Felton 74 y o  male MRN: [id number]  Unit/Bed#: [place] [facility] [id number] Encounter: [id number]    ASSESSMENT and PLAN:  Acute kidney injury:  The setting of Chronic Kidney Disease IIIB  Etiology: Suspect ATN in the setting of COVID-19 infection as well as fluid overload component status post diuretics  Assessment:  · After review of medical records through [address] [place] [address] it appears that the patient  has a baseline Creatinine of 1 8-2 1  · Patient follows [facility] at [facility] nephrology  · Patient was admitted with a creatinine of 2 13 on 11/19/2020  · Peak creatinine 2 71 11/20/2021  · Creatinine decreased 1 91 on 11/24/2021  · Currently 2 15-status post IV Lasix yesterday for lower extremity swelling  · May have new baseline  · Clinically, patient is not uremic and there is no acute indication for renal replacement therapy   Plan:  · Continue to Avoid nephrotoxins, adjust meds to appropriate GFR  · Continue to Optimize hemodynamic status to avoid delay in renal recovery  · Continue to Use diuretics as needed for volume overload  · Will continue trend I/O, lab values volume status  · Avoid NSAIDs and limit IV contrast  · Continue with strict I&O  · Will need follow-up with his primary nephrologist on discharge     Blood pressure/Hypertension:  Assessment and Plan:  · Blood pressure overall remains stable  · Current medications include:  Amlodipine 5 mg daily and Imdur 60 mg daily  · Hold parameters for SBP less than 130 on amlodipine in place  · Maximize hemodynamics to maintain MAP >65  · Avoid hypotension or fluctuations in blood pressure  · Will continue to trend     Acid-base:  Metabolic acidosis-resolved  Assessment and Plan:  · Current bicarb 30  · Currently on sodium bicarbonate tablet 60 mg 2 times daily-will decrease to daily for now-hopefully  discontinue in a m  · Will continue to trend     Electrolytes:  Assessment and Plan:  · Pseudo Hyponatremia:   In the setting of hyperglycemia  · Remains On Decadron per primary team  · Slowly improving  Hyperkalemia:  · Current 5 6  · Status post IV Lasix 11/25/2021  · On low-potassium diet  · Will continue trend     Other medical Issues:  COVID-19 infection:  Management Per primary team  Acute on chronic congestive heart failure:  Status with IV Lasix yesterday for lower extremity swelling  · Per primary team  · Echocardiogram obtained on 11/21/2021 reveals EF of 67%, approaching severe concentric left ventricular hypertrophy  IVC not well visualized     Gram-positive bacteremia:  Unclear source  · Infectious disease following  · On antibiotics    SUBJECTIVE:  Patient seen and examined at bedside  Patient complaining of headache, cough causing chest discomfort    Denies nausea vomiting    OBJECTIVE:  Current Weight: Weight - Scale: 101 kg (222 lb 10 6 oz)  Vitals:    11/26/21 0330 11/26/21 0352 11/26/21 0543 11/26/21 0807   BP:    152/59   BP Location:       Pulse: 68 68  81   Resp:    16   Temp:    97 7 °F (36 5 °C)   TempSrc:       SpO2: 97% 97%  100%   Weight:   101 kg (222 lb 10 6 oz)    Height:           Intake/Output Summary (Last 24 hours) at 11/26/2021 1126  Last data filed at 11/26/2021 0300  Gross per 24 hour   Intake 340 ml   Output 1400 ml   Net -1060 ml     General:  No acute distress, cooperative, sitting on side of bed  Skin:  Warm and dry without rash  ENMT:  Mucous membranes moist, sclera anicteric  Neck:  Supple without JVD  Respiratory:  Essentially clear on auscultation without crackles, rhonchi, wheezes, decreased bases  Cardiac:  Regular rate and rhythm without rub, or murmur  GI:  Soft, nontender, no distention, active bowel sounds  Extremities:  Bilateral lower extremity swelling, +1  Neuro:  Alert oriented and awake  Psych:  Appropriate affect    Medications:    Current Facility-Administered Medications:     amLODIPine (NORVASC) tablet 5 mg, 5 mg, Oral, Daily, CIELO Anne, 5 mg at 11/26/21 9170    atorvastatin (LIPITOR) tablet 40 mg, 40 mg, Oral, HS, Hansel Faustin MD, 40 mg at 21 2151    Baricitinib (OLUMIANT) (COVID EUA) tablet 2 mg, 2 mg, Oral, Q24H, Savannah Benedict MD, 2 mg at 21 1342    ceFAZolin (ANCEF) IVPB (premix in dextrose) 2,000 mg 50 mL, 2,000 mg, Intravenous, Q8H, Bonnie Gonzalez MD, Last Rate: 100 mL/hr at 21 1107, 2,000 mg at 21 1107    dexamethasone (DECADRON) 11 1 mg in sodium chloride 0 9 % 50 mL IVPB, 0 1 mg/kg, Intravenous, Q12H [address], CIELO Song, Last Rate: 100 mL/hr at 21 0904, 11 1 mg at 21 0904    guaiFENesin (MUCINEX) 12 hr tablet 600 mg, 600 mg, Oral, Q12H [address], Hansel Faustin MD, 600 mg at 21 0856    heparin (porcine) subcutaneous injection 5,000 Units, 5,000 Units, Subcutaneous, Q8H [address], Hansel Faustin MD, 5,000 Units at 21 0538    hydrALAZINE (APRESOLINE) injection 5 mg, 5 mg, Intravenous, Q6H PRN, CIELO Moreno    hydrocodone-chlorpheniramine polistirex (TUSSIONEX) ER suspension 5 mL, 5 mL, Oral, Q12H PRN, Hansel Faustin MD, 5 mL at 21 1115    hydrOXYzine HCL (ATARAX) tablet 25 mg, 25 mg, Oral, Q6H PRN, CIELO Osuna, 25 mg at 21 2244    insulin glargine (LANTUS) subcutaneous injection 35 Units 0 35 mL, 35 Units, Subcutaneous, HS, Mable Lieberman PA-C, 35 Units at 21    insulin lispro (HumaLOG) 100 units/mL subcutaneous injection 1-5 Units, 1-5 Units, Subcutaneous, TID AC, 3 Units at 21 0859 **AND** Fingerstick Glucose (POCT), , , TID SALLY, Yfn Hart MD    insulin lispro (HumaLOG) 100 units/mL subcutaneous injection 15 Units, 15 Units, Subcutaneous, TID With Meals, Brittney Mackey PA-C, 15 Units at 21 0858    isosorbide mononitrate (IMDUR) 24 hr tablet 60 mg, 60 mg, Oral, QAM, CIELO Garcia, 60 mg at 21 0856    Lidocaine Viscous HCl (XYLOCAINE) 2 % mucosal solution 15 mL, 15 mL, Swish & Spit, 4x Daily PRN, CIELO Osuna, 15 mL at 11/25/21 2346    loratadine (CLARITIN) tablet 10 mg, 10 mg, Oral, Daily, Darlin Ferguson MD, 10 mg at 11/26/21 0856    melatonin tablet 6 mg, 6 mg, Oral, HS, CIELO Osuna, 6 mg at 11/25/21 2151    oxyCODONE (ROXICODONE) IR tablet 5 mg, 5 mg, Oral, Q4H PRN, Darlin Ferguson MD, 5 mg at 11/25/21 2350    pantoprazole (PROTONIX) EC tablet 40 mg, 40 mg, Oral, Early Morning, Darlin Ferguson MD, 40 mg at 11/26/21 0538    phenol (CHLORASEPTIC) 1 4 % mucosal liquid 1 spray, 1 spray, Mouth/Throat, Q2H PRN, Darlin Ferguson MD, 1 spray at 11/26/21 1115    senna-docusate sodium (SENOKOT S) 8 6-50 mg per tablet 1 tablet, 1 tablet, Oral, BID, Jasvir Coleman PA-C, 1 tablet at 11/26/21 0856    sodium bicarbonate tablet 650 mg, 650 mg, Oral, BID after meals, CIELO Muniz, 650 mg at 11/26/21 0856    sodium chloride (OCEAN) 0 65 % nasal spray 1 spray, 1 spray, Each Nare, Q1H PRN, Darlin Ferguson MD    tiZANidine (ZANAFLEX) tablet 4 mg, 4 mg, Oral, Q8H PRN, Darlin Ferguson MD, 4 mg at 11/24/21 0809    Laboratory Results:  Results from last 7 days   Lab Units 11/26/21  0447 11/25/21  0613 11/24/21  0452 11/23/21  0446 11/22/21  0548 11/21/21  0452 11/20/21  0443   WBC Thousand/uL 6 93 8 94 15 05* 11 93* 17 07* 14 47* 17 84*   HEMOGLOBIN g/dL 12 1 11 9* 13 0 12 3 12 8 12 6 12 6   HEMATOCRIT % 36 5 36 4* 39 6 35 1* 40 5 39 4 39 7   PLATELETS Thousands/uL 181 164 180 154 152 125* 113*   SODIUM mmol/L 136 136 140 134* 136 132* 130*   POTASSIUM mmol/L 5 6* 5 4* 4 6 5 1 4 8 5 4* 5 5*   CHLORIDE mmol/L 100 103 104 100 101 100 99*   CO2 mmol/L 30 26 24 22 21 21 20*   BUN mg/dL 49* 47* 47* 53* 62* 55* 40*   CREATININE mg/dL 2 15* 1 98* 1 91* 2 12* 2 48* 2 46* 2 71*   CALCIUM mg/dL 8 1* 8 1* 8 4 8 2* 8 7 8 3 8 1*

## 2021-11-26 NOTE — PROGRESS NOTES
Progress Note - Pulmonary   Jenness Pastor 74 y o  male MRN: [id number]  Unit/Bed#: [place] [id number] Encounter: [id number]      Assessment/Plan:         1  Acute hypoxic respiratory failure secondary to COVID 19  1  Titrate oxygen as needed to maintain SpO2 >/=88%  2  Pulmonary toilet: IS, cough deep breathe  3  Side lying and prone position  4  Current O2 needs 6L NC  2  COVID 19 moderate protocol started  1  Recommendations  1  No change  2  Medications  1  lipitor  2  Anticoagulation DVT prophylaxis  3  Remdesivir day 5/5 completed  4  decadron dosing 0 1mg/kg BID 8/10-would recommend taper  When 10 days completed: start at 10 mg and taper by 2 mg every 2 days  5  Diuretics: IV lasix per nephrology  6  Baricitinib 8/14  7  Convalsecent plasma NA  8  Antibiotics per ID  3  HUBER on CKD IIIB-HUBER resolved  4  Hyperkalemia  1  Per nephrology lasix administered yesterday without improvement in K  2  Treatment per IM      *we will sign off, please call with questions or concerns    Subjective:     James Kapoor was seen in bed upon entering the room  Denies: chest pain, fevers, chills or hemoptysis  + dyspnea and cough    Objective:         Vitals: Blood pressure 152/59, pulse 81, temperature 97 7 °F (36 5 °C), resp  rate 16, height 5' 4" (1 626 m), weight 101 kg (222 lb 10 6 oz), SpO2 100 %  , 6 L midflow, Body mass index is 38 22 kg/m²        Intake/Output Summary (Last 24 hours) at 11/26/2021 1212  Last data filed at 11/26/2021 0300  Gross per 24 hour   Intake 340 ml   Output 1400 ml   Net -1060 ml         Physical Exam  Gen: Awake, alert, oriented x 3, no  acute distress  HEENT: Mucous membranes moist, no oral lesions, no thrush  NECK: no accessory muscle use, JVP not elevated  Cardiac: Regular, single S1, single S2, no murmurs, no rubs, no gallops  Lungs: decreased breath sounds  Abdomen: obese, normoactive bowel sounds, soft nontender, nondistended, no rebound or rigidity, no guarding  Extremities: no cyanosis, no clubbing, + lower extremity edema    Labs: I have personally reviewed pertinent lab results  , CBC:   Lab Results   Component Value Date    WBC 6 93 11/26/2021    HGB 12 1 11/26/2021    HCT 36 5 11/26/2021    MCV 74 (L) 11/26/2021     11/26/2021    MCH 24 5 (L) 11/26/2021    MCHC 33 2 11/26/2021    RDW 16 4 (H) 11/26/2021    MPV 10 5 11/26/2021   , CMP:   Lab Results   Component Value Date    SODIUM 136 11/26/2021    K 5 6 (H) 11/26/2021     11/26/2021    CO2 30 11/26/2021    BUN 49 (H) 11/26/2021    CREATININE 2 15 (H) 11/26/2021    CALCIUM 8 1 (L) 11/26/2021     (H) 11/26/2021    ALT 68 11/26/2021    ALKPHOS 231 (H) 11/26/2021    EGFR 31 11/26/2021     Imaging and other studies: none      Ying Arredondo

## 2021-11-26 NOTE — ASSESSMENT & PLAN NOTE
Lab Results   Component Value Date    HGBA1C 8 0 (H) 11/04/2021   Improving appetite, hyperglycemia secondary to steroids  Increased Lantus to 30 units hs, humalog 10 units with meals  Still with hyperglycemia in the 400s 11/23   - increased to humalog 15 units TID --> 20 units TID   - increased to lantus 35 units HS --> 40 units HS  Blood glucose still elevated - advanced insulin dosage

## 2021-11-26 NOTE — ASSESSMENT & PLAN NOTE
Results from last 7 days   Lab Units 11/26/21  0447 11/25/21  0613 11/24/21  0452 11/23/21  0446   CREATININE mg/dL 2 15* 1 98* 1 91* 2 12*   Previous lab shows patient's baseline creatinine roughly around 1 8-2  Peak Cr 2 70- Likely prerenal in setting of COVID, poor p o   Intake, mildly elevated CK  Nephrology following-creatinine has improved since admission  Nephrology gave 1 time dose of IV lasix 40 mg x1 on 11/25

## 2021-11-26 NOTE — PROGRESS NOTES
Progress Note - Infectious Disease   Maria Elena Finnegan 74 y o  male MRN: [id number]  Unit/Bed#: [place] [address] -01 Encounter: [id number]      Impression/Plan:    1  MSSA bacteremia   Present in 1/2 sets of admission blood cultures  Also growing staph epidermidis  It is difficult to call staph aureus a contaminant although this is a possibility  Given the high risk with inappropriate treatment, would treat as a true pathogen  Unclear source  Consider superimposed staph pneumonia in setting of sputum production and COVID 19 infection  TTE no vegetations  Has PiV in place, no central lines or intravascular devices  Repeat cultures no growth              -continue cefazolin 2g IV q8hr   -Plan for a 2 week course of IV antibiotics for transient bacteremia from 1st negative culture, end date 12/5/21                 2  Staph epidermidis bacteremia  1/2 sets admission blood cultures growing staph epidermidis  Likely contaminant               -no further treatment for this isolate     3  Sepsis-POA  Leukocytosis, tachypnea  Due to COVID 19 infection, staph aureus bacteremia  Consider possibility of superimposed bacterial pneumonia given productive cough and staph aureus in blood culture  Procalcitonin is elevated, but likely due to HUBER as trend corresponds to changes in creatinine and is now improving  Regardless, he has completed a 5 day course of Ceftriaxone and doxycycline               -cefazolin as above              -monitor fever curve, WBC count     4  Hypoxic respiratory failure  Secondary to COVID 19 infection  Remains on midflow O2, weaned to 7LPM today               -continue COVID specific therapies              -wean O2 as able              -supportive care per primary     5  COVID-19 infection  Patient unvaccinated  Positive 11/17  Started on moderate pathway               -s/p 5 days remdesivir              -continue steroids, baractinib              -continue O2, wean as able     6   Type 2 diabetes mellitus, hyperglycemia  HbA1c 8%  Also with steroid induced hyperglycemia                -glycemic management per primary team     7  HUBER on CKD3  Creatinine increased after admission due to poor PO intake, infection  Now improved to baseline              -nephrology following     I have discussed the above management plan in detail with the primary service and patient  ID will see again   Please call with questions  Antibiotics:  Cefazolin D2  Abx D8    Subjective:  Patient weaning to 7L O2 today  No fever, chills, chest pain  Reports shortness of breath is somewhat improving  Continues to have a headache  Objective:  Vitals:  Temp:  [97 2 °F (36 2 °C)-97 7 °F (36 5 °C)] 97 7 °F (36 5 °C)  HR:  [66-88] 83  Resp:  [16-20] 16  BP: (124-165)/(59-79) 124/65  SpO2:  [91 %-100 %] 93 %  Temp (24hrs), Av 4 °F (36 3 °C), Min:97 2 °F (36 2 °C), Max:97 7 °F (36 5 °C)  Current: Temperature: 97 7 °F (36 5 °C)    Physical Exam:   General Appearance: Tachypnea but appears non toxic   Throat: Oropharynx moist without lesions  Lungs:   Decreased breath sounds bilaterally, +tachypnea   Heart:  RRR; no murmur, rub or gallop   Abdomen:   Soft, non-tender, non-distended, positive bowel sounds  Extremities: No clubbing, cyanosis or edema   Skin: No new rashes or lesions  No draining wounds noted  Labs:    All pertinent labs and imaging studies were personally reviewed  Results from last 7 days   Lab Units 21   WBC Thousand/uL 6 93 8 94 15 05*   HEMOGLOBIN g/dL 12 1 11 9* 13 0   PLATELETS Thousands/uL 181 164 180     Results from last 7 days   Lab Units 21  0621  0613 21  0548 21  0452   SODIUM mmol/L 136  --  136  --  140   < > 132*   POTASSIUM mmol/L 5 6*   < > 5 4*   < > 4 6   < > 5 4*   CHLORIDE mmol/L 100   < > 103   < > 104   < > 100   CO2 mmol/L 30   < > 26   < > 24   < > 21   BUN mg/dL 49*   < > 47*   < > 47*   < > 55*   CREATININE mg/dL 2 15*   < > 1 98*   < > 1 91*   < > 2 46*   EGFR ml/min/1 73sq m 31   < > 34   < > 35   < > 26   CALCIUM mg/dL 8 1*   < > 8 1*   < > 8 4   < > 8 3   AST U/L 118*  --  120*  --   --   --  98*   ALT U/L 68   < > 80*  --   --   --  41   ALK PHOS U/L 231*   < > 227*  --   --   --  151*    < > = values in this interval not displayed  Results from last 7 days   Lab Units 11/22/21  0548 11/21/21  0452 11/20/21  0443   PROCALCITONIN ng/ml 11 69* 21 52* 36 64*     Results from last 7 days   Lab Units 11/21/21  0452 11/20/21  0443   CRP mg/L 195 4* 215 3*               Micro:  Results from last 7 days   Lab Units 11/22/21  1801 11/22/21  1800   BLOOD CULTURE  No Growth at 72 hrs  No Growth at 72 hrs  Imaging:  I have personally reviewed pertinent imaging study reports and images in PACS     CXR 11/19/21:  Bilateral groundglass opacities      Other Studies:   I have personally reviewed pertinent reports

## 2021-11-26 NOTE — ASSESSMENT & PLAN NOTE
Hyperkalemia in the setting of HUBER  Resolved- however K slowly increasing now  Given extra dose of insulin and Kayexalate 1/26/21  Potassium improved and is currently within normal limits

## 2021-11-26 NOTE — PLAN OF CARE
Problem: PAIN - ADULT  Goal: Verbalizes/displays adequate comfort level or baseline comfort level  Description: Interventions:  - Encourage patient to monitor pain and request assistance  - Assess pain using appropriate pain scale  - Administer analgesics based on type and severity of pain and evaluate response  - Implement non-pharmacological measures as appropriate and evaluate response  - Consider cultural and social influences on pain and pain management  - Notify physician/advanced practitioner if interventions unsuccessful or patient reports new pain  Outcome: Progressing     Problem: INFECTION - ADULT  Goal: Absence or prevention of progression during hospitalization  Description: INTERVENTIONS:  - Assess and monitor for signs and symptoms of infection  - Monitor lab/diagnostic results  - Monitor all insertion sites, i e  indwelling lines, tubes, and drains  - Monitor endotracheal if appropriate and nasal secretions for changes in amount and color  - [place] appropriate cooling/warming therapies per order  - Administer medications as ordered  - Instruct and encourage patient and family to use good hand hygiene technique  - Identify and instruct in appropriate isolation precautions for identified infection/condition  Outcome: Progressing     Problem: SAFETY ADULT  Goal: Patient will remain free of falls  Description: INTERVENTIONS:  - Educate patient/family on patient safety including physical limitations  - Instruct patient to call for assistance with activity   - Consult OT/PT to assist with strengthening/mobility   - Keep Call bell within reach  - Keep bed low and locked with side rails adjusted as appropriate  - Keep care items and personal belongings within reach  - Initiate and maintain comfort rounds  - Make Fall Risk Sign visible to staff  - Apply yellow socks and bracelet for high fall risk patients  - Consider moving patient to room near nurses station  Outcome: Progressing  Goal: Maintain or return to baseline ADL function  Description: INTERVENTIONS:  -  Assess patient's ability to carry out ADLs; assess patient's baseline for ADL function and identify physical deficits which impact ability to perform ADLs (bathing, care of mouth/teeth, toileting, grooming, dressing, etc )  - Assess/evaluate cause of self-care deficits   - Assess range of motion  - Assess patient's mobility; develop plan if impaired  - Assess patient's need for assistive devices and provide as appropriate  - Encourage maximum independence but intervene and supervise when necessary  - Involve family in performance of ADLs  - Assess for home care needs following discharge   - Consider OT consult to assist with ADL evaluation and planning for discharge  - Provide patient education as appropriate  Outcome: Progressing  Goal: Maintains/Returns to pre admission functional level  Description: INTERVENTIONS:  - Perform BMAT or MOVE assessment daily    - Set and communicate daily mobility goal to care team and patient/family/caregiver  - Collaborate with rehabilitation services on mobility goals if consulted  - Perform Range of Motion 4 times a day  - Reposition patient every 2 hours    - Dangle patient 4 times a day  - Stand patient 4 times a day  - Ambulate patient 4 times a day  - Out of bed to chair 4 times a day   - Out of bed for meals 3 times a day  - Out of bed for toileting  - Record patient progress and toleration of activity level   Outcome: Progressing     Problem: RESPIRATORY - ADULT  Goal: Achieves optimal ventilation and oxygenation  Description: INTERVENTIONS:  - Assess for changes in respiratory status  - Assess for changes in mentation and behavior  - Position to facilitate oxygenation and minimize respiratory effort  - Oxygen administered by appropriate delivery if ordered  - Initiate smoking cessation education as indicated  - Encourage broncho-pulmonary hygiene including cough, deep breathe, Incentive Spirometry  - Assess the need for suctioning and aspirate as needed  - Assess and instruct to report SOB or any respiratory difficulty  - Respiratory Therapy support as indicated  Outcome: Progressing     Problem: Potential for Falls  Goal: Patient will remain free of falls  Description: INTERVENTIONS:  - Educate patient/family on patient safety including physical limitations  - Instruct patient to call for assistance with activity   - Consult OT/PT to assist with strengthening/mobility   - Keep Call bell within reach  - Keep bed low and locked with side rails adjusted as appropriate  - Keep care items and personal belongings within reach  - Initiate and maintain comfort rounds  - Make Fall Risk Sign visible to staff  - Apply yellow socks and bracelet for high fall risk patients  - Consider moving patient to room near nurses station  Outcome: Progressing     Problem: CARDIOVASCULAR - ADULT  Goal: Maintains optimal cardiac output and hemodynamic stability  Description: INTERVENTIONS:  - Monitor I/O, vital signs and rhythm  - Monitor for S/S and trends of decreased cardiac output  - Administer and titrate ordered vasoactive medications to optimize hemodynamic stability  - Assess quality of pulses, skin color and temperature  - Assess for signs of decreased coronary artery perfusion  - Instruct patient to report change in severity of symptoms  Outcome: Progressing     Problem: GENITOURINARY - ADULT  Goal: Maintains or returns to baseline urinary function  Description: INTERVENTIONS:  - Assess urinary function  - Encourage oral fluids to ensure adequate hydration if ordered  - Administer IV fluids as ordered to ensure adequate hydration  - Administer ordered medications as needed  - Offer frequent toileting  - Follow urinary retention protocol if ordered  Outcome: Progressing     Problem: METABOLIC, FLUID AND ELECTROLYTES - ADULT  Goal: Electrolytes maintained within normal limits  Description: INTERVENTIONS:  - Monitor labs and assess patient for signs and symptoms of electrolyte imbalances  - Administer electrolyte replacement as ordered  - Monitor response to electrolyte replacements, including repeat lab results as appropriate  - Instruct patient on fluid and nutrition as appropriate  Outcome: Progressing  Goal: Fluid balance maintained  Description: INTERVENTIONS:  - Monitor labs   - Monitor I/O and WT  - Instruct patient on fluid and nutrition as appropriate  - Assess for signs & symptoms of volume excess or deficit  Outcome: Progressing  Goal: Glucose maintained within target range  Description: INTERVENTIONS:  - Monitor Blood Glucose as ordered  - Assess for signs and symptoms of hyperglycemia and hypoglycemia  - Administer ordered medications to maintain glucose within target range  - Assess nutritional intake and initiate nutrition service referral as needed  Outcome: Progressing     Problem: MUSCULOSKELETAL - ADULT  Goal: Maintain or return mobility to safest level of function  Description: INTERVENTIONS:  - Assess patient's ability to carry out ADLs; assess patient's baseline for ADL function and identify physical deficits which impact ability to perform ADLs (bathing, care of mouth/teeth, toileting, grooming, dressing, etc )  - Assess/evaluate cause of self-care deficits   - Assess range of motion  - Assess patient's mobility  - Assess patient's need for assistive devices and provide as appropriate  - Encourage maximum independence but intervene and supervise when necessary  - Involve family in performance of ADLs  - Assess for home care needs following discharge   - Consider OT consult to assist with ADL evaluation and planning for discharge  - Provide patient education as appropriate  Outcome: Progressing     Problem: Prexisting or High Potential for Compromised Skin Integrity  Goal: Skin integrity is maintained or improved  Description: INTERVENTIONS:  - Identify patients at risk for skin breakdown  - Assess and monitor skin integrity  - Assess and monitor nutrition and hydration status  - Monitor labs   - Assess for incontinence   - Turn and reposition patient  - Assist with mobility/ambulation  - Relieve pressure over bony prominences  - Avoid friction and shearing  - Provide appropriate hygiene as needed including keeping skin clean and dry  - Evaluate need for skin moisturizer/barrier cream  - Collaborate with interdisciplinary team   - Patient/family teaching  - Consider wound care consult   Outcome: Progressing     Problem: MOBILITY - ADULT  Goal: Maintain or return to baseline ADL function  Description: INTERVENTIONS:  -  Assess patient's ability to carry out ADLs; assess patient's baseline for ADL function and identify physical deficits which impact ability to perform ADLs (bathing, care of mouth/teeth, toileting, grooming, dressing, etc )  - Assess/evaluate cause of self-care deficits   - Assess range of motion  - Assess patient's mobility; develop plan if impaired  - Assess patient's need for assistive devices and provide as appropriate  - Encourage maximum independence but intervene and supervise when necessary  - Involve family in performance of ADLs  - Assess for home care needs following discharge   - Consider OT consult to assist with ADL evaluation and planning for discharge  - Provide patient education as appropriate  Outcome: Progressing  Goal: Maintains/Returns to pre admission functional level  Description: INTERVENTIONS:  - Perform BMAT or MOVE assessment daily    - Set and communicate daily mobility goal to care team and patient/family/caregiver  - Collaborate with rehabilitation services on mobility goals if consulted  - Perform Range of Motion 4 times a day  - Reposition patient every 2 hours    - Dangle patient 4 times a day  - Stand patient 4 times a day  - Ambulate patient 4 times a day  - Out of bed to chair 4 times a day   - Out of bed for meals 3 times a day  - Out of bed for toileting  - Record patient progress and toleration of activity level   Outcome: Progressing

## 2021-11-26 NOTE — PROGRESS NOTES
[address] [place] [address]  Progress Note - Antione Rank 1955, 74 y o  male MRN: [id number]  Unit/Bed#: [place] 2 -01 Encounter: [id number]  Primary Care Provider: Joan Acuna MD   Date and time admitted to hospital: 11/19/2021  6:27 AM    * Acute hypoxemic respiratory failure due to COVID-19 [facility])  Assessment & Plan  38-year-old male- DM2, CAD, hypertension presented with shortness of breath and cough  Patient found to be septic, tachypnea, leukocytosis  COVID positive 11/17, unvaccinated  Checks x-ray shows multifocal pneumonia  Procalcitonin significantly elevated, concern for superimposed bacterial infection  Completed IV Rocephin and remdesivir x5 days  Currently on IV steroids day # 8/10, baricitnib day # 7/14  Pulmonology recommending a taper once completed 10 days   -Start taper at 10 mg and taper by 2 mg every 2 days   Encourage self prone in, incentive spirometry, ambulation as able  Was on non-rebreather and midflow at 15 L  Tapered off non-rebreather 11/23 and currently on 15 L mid flow  Tapering oxygen today  Patient was on 10 L mid flow and tolerating  Now currently on 6 L mid flow-continue monitor  Seen and evaluated by pulmonology    Hyperkalemia  Assessment & Plan  Hyperkalemia in the setting of HUBER  Resolved- however K slowly increasing now  Given extra dose of insulin and Kayexalate today  Recheck in a m  Gram-positive bacteremia  Assessment & Plan  One of 2 blood cultures from 11/19 growing Gram-positive cocci: Staph aureus & Staph epidermis  Seen and evaluated by Infectious Disease- need to Staph aureus as a true pathogen given high risk with inappropriate treatment  Unclear source     Started on IV vancomycin 11/23  Repeat blood cultures -currently with no growth  Anticipate 2 week course of IV antibiotics if repeat cultures remain negative  Will likely need PICC placement    Sepsis due to COVID-19 [facility])  Assessment & Plan  Leukocytosis and tachypneic on admission in setting of COVID pneumonia  Treatment as above    HUBER (acute kidney injury) [facility])  Assessment & Plan  Results from last 7 days   Lab Units 11/26/21  0447 11/25/21  0613 11/24/21  0452 11/23/21  0446   CREATININE mg/dL 2 15* 1 98* 1 91* 2 12*   Previous lab shows patient's baseline creatinine roughly around 1 8-2  Peak Cr 2 70- Likely prerenal in setting of COVID, poor p o  Intake, mildly elevated CK  Nephrology following-creatinine has improved since admission  Nephrology gave 1 time dose of IV lasix 40 mg x1 on 11/25    Essential hypertension  Assessment & Plan  Home regimen amlodipine 5 mg daily  /65    Hx of CABG  Assessment & Plan  History of CAD status post CABG and stents  Not on aspirin or beta blockers  Continue Imdur and statin    Type 2 diabetes mellitus, without long-term current use of insulin ([facility])  Assessment & Plan  Lab Results   Component Value Date    HGBA1C 8 0 (H) 11/04/2021   Improving appetite, hyperglycemia secondary to steroids  Increased Lantus to 30 units hs, humalog 10 units with meals  Still with hyperglycemia in the 400s 11/23   - increased to humalog 15 units TID --> 20 units TID   - increased to lantus 35 units HS --> 40 units HS  Blood glucose still elevated - advanced insulin dosage        VTE Pharmacologic Prophylaxis:   Pharmacologic: Heparin  Mechanical VTE Prophylaxis in Place: Yes    Discharge Plan: With need for continued inpatient stay for COVID on oxygen requirements    Discussions with Specialists or Other Care Team Provider:  Nursing    Education and Discussions with Family / Patient:  Patient    Time Spent for Care: 30 minutes  More than 50% of total time spent on counseling and coordination of care as described above  Current Length of Stay: 7 day(s)  Current Patient Status: Inpatient   Code Status: Level 1 - Full Code    Subjective:   Patient resting in bed  Reports breathing is improving  Complains of a headache again today   Urinating without difficulty but still has difficulty having a BM  Objective:     Vitals:   Temp (24hrs), Av 4 °F (36 3 °C), Min:97 2 °F (36 2 °C), Max:97 7 °F (36 5 °C)    Temp:  [97 2 °F (36 2 °C)-97 7 °F (36 5 °C)] 97 7 °F (36 5 °C)  HR:  [66-88] 83  Resp:  [16-20] 16  BP: (124-165)/(57-79) 124/65  SpO2:  [91 %-100 %] 93 %  Body mass index is 38 22 kg/m²  Input and Output Summary (last 24 hours): Intake/Output Summary (Last 24 hours) at 2021 1414  Last data filed at 2021 0300  Gross per 24 hour   Intake 100 ml   Output 1400 ml   Net -1300 ml       Physical Exam:     Physical Exam  Vitals and nursing note reviewed  Constitutional:       General: He is not in acute distress  Appearance: Normal appearance  He is normal weight  He is not ill-appearing, toxic-appearing or diaphoretic  HENT:      Head: Normocephalic and atraumatic  Eyes:      General: No scleral icterus  Cardiovascular:      Rate and Rhythm: Normal rate and regular rhythm  Pulmonary:      Effort: Pulmonary effort is normal  No respiratory distress  Breath sounds: Normal breath sounds  No stridor  No rhonchi  Comments: On 6 L mid flow  Abdominal:      General: Bowel sounds are normal  There is no distension  Palpations: Abdomen is soft  There is no mass  Tenderness: There is no abdominal tenderness  Hernia: No hernia is present  Musculoskeletal:         General: No swelling  Cervical back: Neck supple  Skin:     General: Skin is warm and dry  Neurological:      Mental Status: He is alert and oriented to person, place, and time  Mental status is at baseline     Psychiatric:         Mood and Affect: Mood normal          Behavior: Behavior normal          Additional Data:     Labs:    Results from last 7 days   Lab Units 21  0447 21  0613 21  0452   WBC Thousand/uL 6 93   < > 15 05*   HEMOGLOBIN g/dL 12 1   < > 13 0   HEMATOCRIT % 36 5   < > 39 6   PLATELETS Thousands/uL 181   < > 180   NEUTROS PCT %  --   --  80*   LYMPHS PCT %  --   --  6*   MONOS PCT %  --   --  8   EOS PCT %  --   --  0    < > = values in this interval not displayed  Results from last 7 days   Lab Units 11/26/21  0447   POTASSIUM mmol/L 5 6*   CHLORIDE mmol/L 100   CO2 mmol/L 30   BUN mg/dL 49*   CREATININE mg/dL 2 15*   CALCIUM mg/dL 8 1*   ALK PHOS U/L 231*   ALT U/L 68   AST U/L 118*           * I Have Reviewed All Lab Data Listed Above  * Additional Pertinent Lab Tests Reviewed: [address] Admission Reviewed    Imaging:    Imaging Reports Reviewed Today Include:   Imaging Personally Reviewed by Myself Includes:      Recent Cultures (last 7 days):     Results from last 7 days   Lab Units 11/22/21  1801 11/22/21  1800   BLOOD CULTURE  No Growth at 72 hrs  No Growth at 72 hrs         Last 24 Hours Medication List:   Current Facility-Administered Medications   Medication Dose Route Frequency Provider Last Rate    amLODIPine  5 mg Oral Daily CIELO Francisco      atorvastatin  40 mg Oral HS Yfn Guerrero MD      Baricitinib  2 mg Oral Q24H Rogelio Wesley MD      cefazolin  2,000 mg Intravenous Q8H Lincoln Tatum MD 2,000 mg (11/26/21 1107)    dexamethasone  0 1 mg/kg Intravenous Q12H [facility] CIELO Henning 11 1 mg (11/26/21 0904)    guaiFENesin  600 mg Oral Q12H [facility] Yfn Guerrero MD      heparin (porcine)  5,000 Units Subcutaneous [facility] Yfn Guerrero MD      hydrALAZINE  5 mg Intravenous Q6H PRN CIELO Stein      hydrocodone-chlorpheniramine polistirex  5 mL Oral Q12H PRN Suki Posadas MD      hydrOXYzine HCL  25 mg Oral Q6H PRN CIELO Stein      insulin glargine  40 Units Subcutaneous HS Mable Lieberman PA-C      insulin lispro  1-5 Units Subcutaneous TID AC Yfn Guerrero MD      insulin lispro  20 Units Subcutaneous TID With Meals Chhaya Bustillo PA-C      isosorbide mononitrate  60 mg Oral QAM CIELO Francisco      Lidocaine Viscous HCl  15 mL Swish & Spit 4x Daily PRN CIELO Hanson      loratadine  10 mg Oral Daily Samantha Garnett MD      melatonin  6 mg Oral HS CIELO Hanson      oxyCODONE  5 mg Oral Q4H PRN Samantha Garnett MD      pantoprazole  40 mg Oral Early Morning Samantha Garnett MD      phenol  1 spray Mouth/Throat Q2H PRN Samantha Garnett MD      senna-docusate sodium  1 tablet Oral BID Gabe Avila PA-C      [START ON 11/27/2021] sodium bicarbonate  650 mg Oral Daily Roselee Goodell, CRNP      sodium chloride  1 spray Each Nare Q1H PRN Samantha Garnett MD      tiZANidine  4 mg Oral Q8H PRN Samantha Garnett MD          Today, Patient Was Seen By: Gabe Avila PA-C    ** Please Note: This note has been constructed using a voice recognition system   **

## 2021-11-26 NOTE — PLAN OF CARE
Problem: OCCUPATIONAL THERAPY ADULT  Goal: Performs self-care activities at highest level of function for planned discharge setting  See evaluation for individualized goals  Description: Treatment Interventions: ADL retraining,Functional transfer training,Endurance training,Patient/family training,Equipment evaluation/education,Continued evaluation,Energy conservation,Activityengagement,Compensatory technique education          See flowsheet documentation for full assessment, interventions and recommendations  Outcome: Progressing  Note: Limitation: Decreased ADL status,Decreased UE strength,Decreased Safe judgement during ADL,Decreased cognition,Decreased endurance,Decreased high-level ADLs  Prognosis: Fair  Assessment: Pt was seen for an OT treatment session focusing on functional transfers, dynamic standing balance, and ADLs  Pt's name, , and wristband were confirmed prior to session  Pt supine in bed at the beginning of the session and agreeable to OT session  Pt on 6L mid flow ranging from 85-99%  Pt was educated on breathing techniques throughout session to aid in increasing SPO2  Pt washed his face, head, and UB w/ S for set-up and increased time  Pt doffed/donned hospital gown w/ S for set-up  Pt then performed LB dressing as he doffed his underwear and pants w/ Min Ax1 w/ RW  Pt requiring Min A/CGA in standing, cues for pacing, and occasional steadying  Pt performed LB bathing requiring S while seated and Min A x1 w/ RW for occasional steadying and cues for pacing  Pt w/ multiple seated rest breaks during ADL routine 2* CHILDS and decrease endurance  Pt performed sit< stand transfers at S level for safety  Pt was able to stand for a total of ~5 min during LB bathing and dressing demonstrating fair- to poor+ dynamic standing balance  During stance, pt reported feeling dizzy   Pt seated EOB, /65 and HR 88 bpm  Pt performed donning of underwear, pants, and socks at a Min A w/ RW for occasional steadying and for safety  Pt performed oral hygiene w/ S for set-up seated EOB as pt c/o fatigue after bathing and dressing  At the end of session, pt was seated in chair with call bell and tray within reach  All needs met and pt with no further questions or concerns for OT  OT recommendations are for pt to receive post-acute rehabilitation vs HHOT pending progress to maximize on pt's independence with: ADLs, IADLs, functional transfers/mobility, increase endurance, and increased safety awareness         OT Discharge Recommendation: Post acute rehabilitation services (vs HHOT pending progress)  OT - OK to Discharge: Yes (when medically cleared)

## 2021-11-27 PROBLEM — R79.89 ELEVATED TROPONIN: Status: ACTIVE | Noted: 2021-01-01

## 2021-11-27 PROBLEM — R77.8 ELEVATED TROPONIN: Status: ACTIVE | Noted: 2021-01-01

## 2021-11-27 NOTE — ASSESSMENT & PLAN NOTE
Lab Results   Component Value Date    HGBA1C 8 0 (H) 11/04/2021   Improving appetite, hyperglycemia secondary to steroids  Increased Lantus to 30 units hs, humalog 10 units with meals  Still with hyperglycemia in the 400s 11/23   - increased to humalog 15 units TID --> 20 units TID   - increased to lantus 35 units HS --> 40 units HS  Blood sugars better today in the 100s to 200s

## 2021-11-27 NOTE — ASSESSMENT & PLAN NOTE
75-year-old male- DM2, CAD, hypertension presented with shortness of breath and cough  Patient found to be septic, tachypnea, leukocytosis  COVID positive 11/17, unvaccinated  Checks x-ray: multifocal pneumonia  Procalcitonin significantly elevated, concern for superimposed bacterial infection  Completed IV Rocephin x5 days  Completed IV remdesivir x5 days  Currently on IV steroids day # 9/10, baricitnib day # 8/14  Pulmonology recommending a taper once completed 10 days steroids   -Start taper at 10 mg and taper by 2 mg every 2 days   Encourage self prone in, incentive spirometry, ambulation as able  Was on non-rebreather and midflow at 15 L  Tapered off non-rebreather 11/23   Patient was improving and oxygen was tapered down to 7 L mid flow   -However on 11/27/21 patient complained of chest pain and required 15 L mid flow after being found to be saturating in the 70s   -check D-dimer, troponin, EKG   -check a m  CRP, D-dimer

## 2021-11-27 NOTE — PLAN OF CARE
Problem: PAIN - ADULT  Goal: Verbalizes/displays adequate comfort level or baseline comfort level  Description: Interventions:  - Encourage patient to monitor pain and request assistance  - Assess pain using appropriate pain scale  - Administer analgesics based on type and severity of pain and evaluate response  - Implement non-pharmacological measures as appropriate and evaluate response  - Consider cultural and social influences on pain and pain management  - Notify physician/advanced practitioner if interventions unsuccessful or patient reports new pain  11/26/2021 1939 by Raghu Huber RN  Outcome: Progressing  11/26/2021 0952 by Raghu Huber RN  Outcome: Progressing     Problem: INFECTION - ADULT  Goal: Absence or prevention of progression during hospitalization  Description: INTERVENTIONS:  - Assess and monitor for signs and symptoms of infection  - Monitor lab/diagnostic results  - Monitor all insertion sites, i e  indwelling lines, tubes, and drains  - Monitor endotracheal if appropriate and nasal secretions for changes in amount and color  - [place] appropriate cooling/warming therapies per order  - Administer medications as ordered  - Instruct and encourage patient and family to use good hand hygiene technique  - Identify and instruct in appropriate isolation precautions for identified infection/condition  11/26/2021 1939 by Raghu Huber RN  Outcome: Progressing  11/26/2021 0952 by Raghu Huber RN  Outcome: Progressing     Problem: SAFETY ADULT  Goal: Patient will remain free of falls  Description: INTERVENTIONS:  - Educate patient/family on patient safety including physical limitations  - Instruct patient to call for assistance with activity   - Consult OT/PT to assist with strengthening/mobility   - Keep Call bell within reach  - Keep bed low and locked with side rails adjusted as appropriate  - Keep care items and personal belongings within reach  - Initiate and maintain comfort rounds  - Make Fall Risk Sign visible to staff  - Offer Toileting every  Hours, in advance of need  - Initiate/Maintain alarm  - Obtain necessary fall risk management equipment:   - Apply yellow socks and bracelet for high fall risk patients  - Consider moving patient to room near nurses station  11/26/2021 1939 by Maurice Perkins RN  Outcome: Progressing  11/26/2021 0952 by Maurice Perkins RN  Outcome: Progressing  Goal: Maintain or return to baseline ADL function  Description: INTERVENTIONS:  -  Assess patient's ability to carry out ADLs; assess patient's baseline for ADL function and identify physical deficits which impact ability to perform ADLs (bathing, care of mouth/teeth, toileting, grooming, dressing, etc )  - Assess/evaluate cause of self-care deficits   - Assess range of motion  - Assess patient's mobility; develop plan if impaired  - Assess patient's need for assistive devices and provide as appropriate  - Encourage maximum independence but intervene and supervise when necessary  - Involve family in performance of ADLs  - Assess for home care needs following discharge   - Consider OT consult to assist with ADL evaluation and planning for discharge  - Provide patient education as appropriate  11/26/2021 1939 by Maurice Perkins RN  Outcome: Progressing  11/26/2021 0952 by Maurice Perkins RN  Outcome: Progressing  Goal: Maintains/Returns to pre admission functional level  Description: INTERVENTIONS:  - Perform BMAT or MOVE assessment daily    - Set and communicate daily mobility goal to care team and patient/family/caregiver  - Collaborate with rehabilitation services on mobility goals if consulted  - Perform Range of Motion  times a day  - Reposition patient every  hours    - Dangle patient  times a day  - Stand patient  times a day  - Ambulate patient  times a day  - Out of bed to chair  times a day   - Out of bed for meals  times a day  - Out of bed for toileting  - Record patient progress and toleration of activity level 11/26/2021 1939 by Mable Munoz RN  Outcome: Progressing  11/26/2021 0952 by Mable Munoz RN  Outcome: Progressing     Problem: Knowledge Deficit  Goal: Patient/family/caregiver demonstrates understanding of disease process, treatment plan, medications, and discharge instructions  Description: Complete learning assessment and assess knowledge base    Interventions:  - Provide teaching at level of understanding  - Provide teaching via preferred learning methods  11/26/2021 1939 by Mable Munoz RN  Outcome: Progressing  11/26/2021 0952 by Mable Munoz RN  Outcome: Progressing     Problem: RESPIRATORY - ADULT  Goal: Achieves optimal ventilation and oxygenation  Description: INTERVENTIONS:  - Assess for changes in respiratory status  - Assess for changes in mentation and behavior  - Position to facilitate oxygenation and minimize respiratory effort  - Oxygen administered by appropriate delivery if ordered  - Initiate smoking cessation education as indicated  - Encourage broncho-pulmonary hygiene including cough, deep breathe, Incentive Spirometry  - Assess the need for suctioning and aspirate as needed  - Assess and instruct to report SOB or any respiratory difficulty  - Respiratory Therapy support as indicated  11/26/2021 1939 by Mable Munoz RN  Outcome: Progressing  11/26/2021 0952 by Mable Munoz RN  Outcome: Progressing     Problem: CARDIOVASCULAR - ADULT  Goal: Maintains optimal cardiac output and hemodynamic stability  Description: INTERVENTIONS:  - Monitor I/O, vital signs and rhythm  - Monitor for S/S and trends of decreased cardiac output  - Administer and titrate ordered vasoactive medications to optimize hemodynamic stability  - Assess quality of pulses, skin color and temperature  - Assess for signs of decreased coronary artery perfusion  - Instruct patient to report change in severity of symptoms  11/26/2021 1939 by Mable Munoz RN  Outcome: Progressing  11/26/2021 0952 by Mable Munoz RN  Outcome: Progressing     Problem: GENITOURINARY - ADULT  Goal: Maintains or returns to baseline urinary function  Description: INTERVENTIONS:  - Assess urinary function  - Encourage oral fluids to ensure adequate hydration if ordered  - Administer IV fluids as ordered to ensure adequate hydration  - Administer ordered medications as needed  - Offer frequent toileting  - Follow urinary retention protocol if ordered  11/26/2021 1939 by Carrie Mueller RN  Outcome: Progressing  11/26/2021 0952 by Carrie Mueller RN  Outcome: Progressing     Problem: METABOLIC, FLUID AND ELECTROLYTES - ADULT  Goal: Electrolytes maintained within normal limits  Description: INTERVENTIONS:  - Monitor labs and assess patient for signs and symptoms of electrolyte imbalances  - Administer electrolyte replacement as ordered  - Monitor response to electrolyte replacements, including repeat lab results as appropriate  - Instruct patient on fluid and nutrition as appropriate  11/26/2021 1939 by Carrie Mueller RN  Outcome: Progressing  11/26/2021 0952 by Carrie Mueller RN  Outcome: Progressing  Goal: Fluid balance maintained  Description: INTERVENTIONS:  - Monitor labs   - Monitor I/O and WT  - Instruct patient on fluid and nutrition as appropriate  - Assess for signs & symptoms of volume excess or deficit  11/26/2021 1939 by Carrie Mueller RN  Outcome: Progressing  11/26/2021 0952 by Carrie Mueller RN  Outcome: Progressing  Goal: Glucose maintained within target range  Description: INTERVENTIONS:  - Monitor Blood Glucose as ordered  - Assess for signs and symptoms of hyperglycemia and hypoglycemia  - Administer ordered medications to maintain glucose within target range  - Assess nutritional intake and initiate nutrition service referral as needed  11/26/2021 1939 by Carrie Mueller RN  Outcome: Progressing  11/26/2021 0952 by Carrie Mueller RN  Outcome: Progressing     Problem: MUSCULOSKELETAL - ADULT  Goal: Maintain or return mobility to safest level of function  Description: INTERVENTIONS:  - Assess patient's ability to carry out ADLs; assess patient's baseline for ADL function and identify physical deficits which impact ability to perform ADLs (bathing, care of mouth/teeth, toileting, grooming, dressing, etc )  - Assess/evaluate cause of self-care deficits   - Assess range of motion  - Assess patient's mobility  - Assess patient's need for assistive devices and provide as appropriate  - Encourage maximum independence but intervene and supervise when necessary  - Involve family in performance of ADLs  - Assess for home care needs following discharge   - Consider OT consult to assist with ADL evaluation and planning for discharge  - Provide patient education as appropriate  11/26/2021 1939 by Gustavo Beaulieu RN  Outcome: Progressing  11/26/2021 0952 by Gustavo Beaulieu RN  Outcome: Progressing     Problem: GASTROINTESTINAL - ADULT  Goal: Maintains or returns to baseline bowel function  Description: INTERVENTIONS:  - Assess bowel function  - Encourage oral fluids to ensure adequate hydration  - Administer IV fluids if ordered to ensure adequate hydration  - Administer ordered medications as needed  - Encourage mobilization and activity  - Consider nutritional services referral to assist patient with adequate nutrition and appropriate food choices  11/26/2021 1939 by Gustavo Beaulieu RN  Outcome: Progressing  11/26/2021 0952 by Gustavo Beauileu RN  Outcome: Progressing     Problem: SKIN/TISSUE INTEGRITY - ADULT  Goal: Skin Integrity remains intact(Skin Breakdown Prevention)  Description: Assess:  -Perform Linus assessment every   -Clean and moisturize skin every   -Inspect skin when repositioning, toileting, and assisting with ADLS  -Assess under medical devices such as  every   -Assess extremities for adequate circulation and sensation     Bed Management:  -Have minimal linens on bed & keep smooth, unwrinkled  -Change linens as needed when moist or perspiring  -Avoid sitting or lying in one position for more than  hours while in bed  -Keep HOB at degrees     Toileting:  -Offer bedside commode  -Assess for incontinence every   -Use incontinent care products after each incontinent episode such as     Activity:  -Mobilize patient  times a day  -Encourage activity and walks on unit  -Encourage or provide ROM exercises   -Turn and reposition patient every  Hours  -Use appropriate equipment to lift or move patient in bed  -Instruct/ Assist with weight shifting every  when out of bed in chair  -Consider limitation of chair time  hour intervals    Skin Care:  -Avoid use of baby powder, tape, friction and shearing, hot water or constrictive clothing  -Relieve pressure over bony prominences using   -Do not massage red bony areas    Next Steps:  -Teach patient strategies to minimize risks such as    -Consider consults to  interdisciplinary teams such as   11/26/2021 1939 by Francisca Montague RN  Outcome: Progressing  11/26/2021 0952 by Francisca Montague RN  Outcome: Progressing     Problem: Potential for Falls  Goal: Patient will remain free of falls  Description: INTERVENTIONS:  - Educate patient/family on patient safety including physical limitations  - Instruct patient to call for assistance with activity   - Consult OT/PT to assist with strengthening/mobility   - Keep Call bell within reach  - Keep bed low and locked with side rails adjusted as appropriate  - Keep care items and personal belongings within reach  - Initiate and maintain comfort rounds  - Make Fall Risk Sign visible to staff  - Offer Toileting every  Hours, in advance of need  - Initiate/Maintain alarm  - Obtain necessary fall risk management equipment:   - Apply yellow socks and bracelet for high fall risk patients  - Consider moving patient to room near nurses station  11/26/2021 1939 by Francisca Montague RN  Outcome: Progressing  11/26/2021 0952 by Francisca Montague RN  Outcome: Progressing     Problem: Prexisting or High Potential for Compromised Skin Integrity  Goal: Skin integrity is maintained or improved  Description: INTERVENTIONS:  - Identify patients at risk for skin breakdown  - Assess and monitor skin integrity  - Assess and monitor nutrition and hydration status  - Monitor labs   - Assess for incontinence   - Turn and reposition patient  - Assist with mobility/ambulation  - Relieve pressure over bony prominences  - Avoid friction and shearing  - Provide appropriate hygiene as needed including keeping skin clean and dry  - Evaluate need for skin moisturizer/barrier cream  - Collaborate with interdisciplinary team   - Patient/family teaching  - Consider wound care consult   11/26/2021 1939 by Anni Patrick RN  Outcome: Progressing  11/26/2021 0952 by Anni Patrick RN  Outcome: Progressing     Problem: MOBILITY - ADULT  Goal: Maintain or return to baseline ADL function  Description: INTERVENTIONS:  -  Assess patient's ability to carry out ADLs; assess patient's baseline for ADL function and identify physical deficits which impact ability to perform ADLs (bathing, care of mouth/teeth, toileting, grooming, dressing, etc )  - Assess/evaluate cause of self-care deficits   - Assess range of motion  - Assess patient's mobility; develop plan if impaired  - Assess patient's need for assistive devices and provide as appropriate  - Encourage maximum independence but intervene and supervise when necessary  - Involve family in performance of ADLs  - Assess for home care needs following discharge   - Consider OT consult to assist with ADL evaluation and planning for discharge  - Provide patient education as appropriate  11/26/2021 1939 by Anni Patrick RN  Outcome: Progressing  11/26/2021 0952 by Anni Patrick RN  Outcome: Progressing  Goal: Maintains/Returns to pre admission functional level  Description: INTERVENTIONS:  - Perform BMAT or MOVE assessment daily    - Set and communicate daily mobility goal to care team and patient/family/caregiver  - Collaborate with rehabilitation services on mobility goals if consulted  - Perform Range of Motion  times a day  - Reposition patient every  hours    - Dangle patient  times a day  - Stand patient  times a day  - Ambulate patient  times a day  - Out of bed to chair times a day   - Out of bed for meals  times a day  - Out of bed for toileting  - Record patient progress and toleration of activity level   11/26/2021 1939 by Akshat Medina RN  Outcome: Progressing  11/26/2021 0952 by Akshat Medina RN  Outcome: Progressing

## 2021-11-27 NOTE — NURSING NOTE
Patient complaining of chest pain and SOB  Patient noted to be 75% on 6L midflow at rest  Patient turned up to 15L midflow and O2 sat increased to 90%  Michelle Hughes PAC made aware   Will continue to monitor

## 2021-11-27 NOTE — PROGRESS NOTES
NEPHROLOGY PROGRESS NOTE   Valla Dandy 74 y o  male MRN: [id number]  Unit/Bed#: [facility] 68 2 -01 Encounter: [id number]  Reason for Consult: HUBER      SUMMARY:    69-year-old male with a history of chronic kidney disease stage III, hypertension presents for shortness of breath and is currently being managed for COVID-19      ASSESSMENT and PLAN:    Acute kidney injury  --present on admission, peak creatinine 2 7 mg/dL  --creatinine had improved to 2 1 mg/dL yesterday, now trending back up to 2 4 mg/dL  --he did not receive any diuretics yesterday  --he feels very dizzy and still very tired and short of breath  --nonoliguric  --not eating well  --secondary to COVID-19, acute tubular necrosis  --will hold diuretic today as he is also tachycardic  --will diurese based on volume status    Chronic kidney disease stage IIIB  --baseline creatinine 1 8-2 1 mg/dL  --follows with [facility]    COVID-19  --completed 5 days of ceftriaxone, completed 5 days of remdesivir  --currently on day 9 of 10 of IV steroids, baricitnib day # 8/14  --planning to be on steroid taper  --following inflammatory markers    Gram-positive bacteremia  --cultures grew out Staph aureus and Staph epidermis  --antibiotics as per the primary team  --started on IV vancomycin  --dose vancomycin based on level    Hyperkalemia--resolved    Hyponatremia  --sodium 134, but normal when corrected for the glucose      SUBJECTIVE / INTERVAL HISTORY:    Feel short of breath feels tired weak poor oral intake    OBJECTIVE:  Current Weight: Weight - Scale: 101 kg (223 lb 1 7 oz)  Vitals:    11/27/21 0600 11/27/21 0757 11/27/21 0928 11/27/21 1408   BP:  132/56 135/64 140/70   BP Location:  Left arm  Left arm   Pulse:  85 101 (!) 115   Resp:  22  (!) 24   Temp:  97 7 °F (36 5 °C)  (!) 97 2 °F (36 2 °C)   TempSrc:  Oral  Oral   SpO2:  91% 92% 93%   Weight: 101 kg (223 lb 1 7 oz)      Height:           Intake/Output Summary (Last 24 hours) at 11/27/2021 [address] filed at 11/26/2021 2000  Gross per 24 hour   Intake 120 ml   Output 875 ml   Net -755 ml       Review of Systems:    12 point ROS has been reviewed  Physical Exam  Vitals and nursing note reviewed  Constitutional:       General: He is not in acute distress  Appearance: He is ill-appearing  He is not toxic-appearing or diaphoretic  HENT:      Head: Normocephalic and atraumatic  Eyes:      General: No scleral icterus  Right eye: No discharge  Left eye: No discharge  Cardiovascular:      Rate and Rhythm: Tachycardia present  Pulmonary:      Effort: No respiratory distress  Abdominal:      General: There is no distension  Tenderness: There is no guarding  Musculoskeletal:      Cervical back: Normal range of motion  Skin:     General: Skin is dry  Coloration: Skin is pale  Neurological:      Mental Status: He is alert and oriented to person, place, and time           Medications:    Current Facility-Administered Medications:     amLODIPine (NORVASC) tablet 5 mg, 5 mg, Oral, Daily, CIELO Mcdermott, 5 mg at 11/27/21 0815    atorvastatin (LIPITOR) tablet 40 mg, 40 mg, Oral, HS, Herve Dotson MD, 40 mg at 11/26/21 2131    Baricitinib (OLUMIANT) (COVID EUA) tablet 2 mg, 2 mg, Oral, Q24H, Sharron Llanes MD, 2 mg at 11/27/21 1415    ceFAZolin (ANCEF) IVPB (premix in dextrose) 2,000 mg 50 mL, 2,000 mg, Intravenous, Q8H, Bonnie Gonzalez MD, Last Rate: 100 mL/hr at 11/27/21 1156, 2,000 mg at 11/27/21 1156    dexamethasone (DECADRON) 11 1 mg in sodium chloride 0 9 % 50 mL IVPB, 0 1 mg/kg, Intravenous, Q12H [facility], CIELO Mills, Last Rate: 100 mL/hr at 11/27/21 0815, 11 1 mg at 11/27/21 0815    guaiFENesin (MUCINEX) 12 hr tablet 600 mg, 600 mg, Oral, Q12H [facility], Herve Dotson MD, 600 mg at 11/27/21 0815    heparin (porcine) subcutaneous injection 5,000 Units, 5,000 Units, Subcutaneous, Q8H [facility], Herve Dotson MD, 5,000 Units at 11/27/21 1416   hydrALAZINE (APRESOLINE) injection 5 mg, 5 mg, Intravenous, Q6H PRN, CIELO Jain    hydrocodone-chlorpheniramine polistirex (TUSSIONEX) ER suspension 5 mL, 5 mL, Oral, Q12H PRN, Javon Castro MD, 5 mL at 11/27/21 0815    hydrOXYzine HCL (ATARAX) tablet 25 mg, 25 mg, Oral, Q6H PRN, CIELO Jain, 25 mg at 11/26/21 2219    insulin glargine (LANTUS) subcutaneous injection 40 Units 0 4 mL, 40 Units, Subcutaneous, HS, Mable Lieberman PA-C, 40 Units at 11/26/21 2131    insulin lispro (HumaLOG) 100 units/mL subcutaneous injection 1-5 Units, 1-5 Units, Subcutaneous, TID AC, 2 Units at 11/27/21 1157 **AND** Fingerstick Glucose (POCT), , , TID AC, Yfn Santillan MD    insulin lispro (HumaLOG) 100 units/mL subcutaneous injection 20 Units, 20 Units, Subcutaneous, TID With Meals, Adma Roblero PA-C, 20 Units at 11/27/21 1156    isosorbide mononitrate (IMDUR) 24 hr tablet 60 mg, 60 mg, Oral, QAM, CIELO Garcia, 60 mg at 11/27/21 0815    Lidocaine Viscous HCl (XYLOCAINE) 2 % mucosal solution 15 mL, 15 mL, Swish & Spit, 4x Daily PRN, CIELO Osuna, 15 mL at 11/25/21 2346    loratadine (CLARITIN) tablet 10 mg, 10 mg, Oral, Daily, Javon Castro MD, 10 mg at 11/27/21 0815    melatonin tablet 6 mg, 6 mg, Oral, HS, CIELO Osuna, 6 mg at 11/26/21 2131    oxyCODONE (ROXICODONE) IR tablet 5 mg, 5 mg, Oral, Q4H PRN, Javon Castro MD, 5 mg at 11/27/21 0610    pantoprazole (PROTONIX) EC tablet 40 mg, 40 mg, Oral, Early Morning, Javon Castro MD, 40 mg at 11/27/21 0512    phenol (CHLORASEPTIC) 1 4 % mucosal liquid 1 spray, 1 spray, Mouth/Throat, Q2H PRN, Javon Castro MD, 1 spray at 11/26/21 2219    senna-docusate sodium (SENOKOT S) 8 6-50 mg per tablet 1 tablet, 1 tablet, Oral, BID, Mable Lieberman PA-C, 1 tablet at 11/27/21 0815    sodium bicarbonate tablet 650 mg, 650 mg, Oral, Daily, CIELO Soto, 650 mg at 11/27/21 0815    sodium chloride (OCEAN) 0 65 % nasal spray 1 spray, 1 spray, Each Nare, Q1H PRN, Leonardo Gaspar MD    tiZANidine (ZANAFLEX) tablet 4 mg, 4 mg, Oral, Q8H PRN, Leonardo Gaspar MD, 4 mg at 11/26/21 2006    Laboratory Results:  Results from last 7 days   Lab Units 11/27/21  0439 11/26/21  0447 11/25/21  0613 11/24/21  0452 11/23/21  0446 11/22/21  0548 11/21/21  0452   WBC Thousand/uL 12 60* 6 93 8 94 15 05* 11 93* 17 07* 14 47*   HEMOGLOBIN g/dL 10 6* 12 1 11 9* 13 0 12 3 12 8 12 6   HEMATOCRIT % 31 8* 36 5 36 4* 39 6 35 1* 40 5 39 4   PLATELETS Thousands/uL 184 181 164 180 154 152 125*   POTASSIUM mmol/L 4 3 5 6* 5 4* 4 6 5 1 4 8 5 4*   CHLORIDE mmol/L 97* 100 103 104 100 101 100   CO2 mmol/L 27 30 26 24 22 21 21   BUN mg/dL 64* 49* 47* 47* 53* 62* 55*   CREATININE mg/dL 2 46* 2 15* 1 98* 1 91* 2 12* 2 48* 2 46*   CALCIUM mg/dL 7 7* 8 1* 8 1* 8 4 8 2* 8 7 8 3

## 2021-11-27 NOTE — PLAN OF CARE
Problem: PAIN - ADULT  Goal: Verbalizes/displays adequate comfort level or baseline comfort level  Description: Interventions:  - Encourage patient to monitor pain and request assistance  - Assess pain using appropriate pain scale  - Administer analgesics based on type and severity of pain and evaluate response  - Implement non-pharmacological measures as appropriate and evaluate response  - Consider cultural and social influences on pain and pain management  - Notify physician/advanced practitioner if interventions unsuccessful or patient reports new pain  Outcome: Progressing     Problem: INFECTION - ADULT  Goal: Absence or prevention of progression during hospitalization  Description: INTERVENTIONS:  - Assess and monitor for signs and symptoms of infection  - Monitor lab/diagnostic results  - Monitor all insertion sites, i e  indwelling lines, tubes, and drains  - Monitor endotracheal if appropriate and nasal secretions for changes in amount and color  - Bow appropriate cooling/warming therapies per order  - Administer medications as ordered  - Instruct and encourage patient and family to use good hand hygiene technique  - Identify and instruct in appropriate isolation precautions for identified infection/condition  Outcome: Progressing     Problem: SAFETY ADULT  Goal: Patient will remain free of falls  Description: INTERVENTIONS:  - Educate patient/family on patient safety including physical limitations  - Instruct patient to call for assistance with activity   - Consult OT/PT to assist with strengthening/mobility   - Keep Call bell within reach  - Keep bed low and locked with side rails adjusted as appropriate  - Keep care items and personal belongings within reach  - Initiate and maintain comfort rounds  - Make Fall Risk Sign visible to staff  - Offer Toileting every  Hours, in advance of need  - Initiate/Maintain alarm  - Obtain necessary fall risk management equipment:   - Apply yellow socks and bracelet for high fall risk patients  - Consider moving patient to room near nurses station  Outcome: Progressing  Goal: Maintain or return to baseline ADL function  Description: INTERVENTIONS:  -  Assess patient's ability to carry out ADLs; assess patient's baseline for ADL function and identify physical deficits which impact ability to perform ADLs (bathing, care of mouth/teeth, toileting, grooming, dressing, etc )  - Assess/evaluate cause of self-care deficits   - Assess range of motion  - Assess patient's mobility; develop plan if impaired  - Assess patient's need for assistive devices and provide as appropriate  - Encourage maximum independence but intervene and supervise when necessary  - Involve family in performance of ADLs  - Assess for home care needs following discharge   - Consider OT consult to assist with ADL evaluation and planning for discharge  - Provide patient education as appropriate  Outcome: Progressing  Goal: Maintains/Returns to pre admission functional level  Description: INTERVENTIONS:  - Perform BMAT or MOVE assessment daily     Problem: RESPIRATORY - ADULT  Goal: Achieves optimal ventilation and oxygenation  Description: INTERVENTIONS:  - Assess for changes in respiratory status  - Assess for changes in mentation and behavior  - Position to facilitate oxygenation and minimize respiratory effort  - Oxygen administered by appropriate delivery if ordered  - Initiate smoking cessation education as indicated  - Encourage broncho-pulmonary hygiene including cough, deep breathe, Incentive Spirometry  - Assess the need for suctioning and aspirate as needed  - Assess and instruct to report SOB or any respiratory difficulty  - Respiratory Therapy support as indicated  Outcome: Progressing     Problem: CARDIOVASCULAR - ADULT  Goal: Maintains optimal cardiac output and hemodynamic stability  Description: INTERVENTIONS:  - Monitor I/O, vital signs and rhythm  - Monitor for S/S and trends of decreased cardiac output  - Administer and titrate ordered vasoactive medications to optimize hemodynamic stability  - Assess quality of pulses, skin color and temperature  - Assess for signs of decreased coronary artery perfusion  - Instruct patient to report change in severity of symptoms  Outcome: Progressing     Problem: GENITOURINARY - ADULT  Goal: Maintains or returns to baseline urinary function  Description: INTERVENTIONS:  - Assess urinary function  - Encourage oral fluids to ensure adequate hydration if ordered  - Administer IV fluids as ordered to ensure adequate hydration  - Administer ordered medications as needed  - Offer frequent toileting  - Follow urinary retention protocol if ordered  Outcome: Progressing     - Out of bed for toileting  - Record patient progress and toleration of activity level   Outcome: Progressing

## 2021-11-27 NOTE — ASSESSMENT & PLAN NOTE
One of 2 blood cultures from 11/19 growing Gram-positive cocci: Staph aureus & Staph epidermis  Seen and evaluated by Infectious Disease- need to Staph aureus as a true pathogen given high risk with inappropriate treatment  Unclear source     Started on IV vancomycin 11/23  Repeat blood cultures -currently with no growth  Anticipate 2 week course of IV antibiotics from negative cultures  Course of antibiotics to end 12/5/21  Will likely need PICC line placed

## 2021-11-27 NOTE — PROGRESS NOTES
[address] [facility]  Progress Note - Manju Julia 1955, 74 y o  male MRN: [id number]  Unit/Bed#: [facility] 87 216-01 Encounter: [id number]  Primary Care Provider: Jean-Claude Gil MD   Date and time admitted to hospital: 11/19/2021  6:27 AM    * Acute hypoxemic respiratory failure due to COVID-19 [facility])  Assessment & Plan  64-year-old male- DM2, CAD, hypertension presented with shortness of breath and cough  Patient found to be septic, tachypnea, leukocytosis  COVID positive 11/17, unvaccinated  Checks x-ray: multifocal pneumonia  Procalcitonin significantly elevated, concern for superimposed bacterial infection  Completed IV Rocephin x5 days  Completed IV remdesivir x5 days  Currently on IV steroids day # 9/10, baricitnib day # 8/14  Pulmonology recommending a taper once completed 10 days steroids   -Start taper at 10 mg and taper by 2 mg every 2 days   Encourage self prone in, incentive spirometry, ambulation as able  Was on non-rebreather and midflow at 15 L  Tapered off non-rebreather 11/23   Patient was improving and oxygen was tapered down to 7 L mid flow   -However on 11/27/21 patient complained of chest pain and required 15 L mid flow after being found to be saturating in the 70s   -check D-dimer, troponin, EKG   -check a m  CRP, D-dimer    Gram-positive bacteremia  Assessment & Plan  One of 2 blood cultures from 11/19 growing Gram-positive cocci: Staph aureus & Staph epidermis  Seen and evaluated by Infectious Disease- need to Staph aureus as a true pathogen given high risk with inappropriate treatment  Unclear source     Started on IV vancomycin 11/23  Repeat blood cultures -currently with no growth  Anticipate 2 week course of IV antibiotics from negative cultures  Course of antibiotics to end 12/5/21  Will likely need PICC line placed    HUBER (acute kidney injury) [facility])  Assessment & Plan  Results from last 7 days   Lab Units 11/27/21  0439 11/26/21  0447 11/25/21  5438 11/24/21  9166 CREATININE mg/dL 2 46* 2 15* 1 98* 1 91*   Previous lab shows patient's baseline creatinine roughly around 1 8-2  Peak Cr 2 70- Likely prerenal in setting of COVID, poor p o  Intake, mildly elevated CK  Nephrology following-creatinine has improved since admission  Nephrology gave 1 time dose of IV lasix 40 mg x1 on 11/25  Will await further Nephrology recommendations the setting of rising creatinine    Hyperkalemia  Assessment & Plan  Hyperkalemia in the setting of HUBER  Resolved- however K slowly increasing now  Given extra dose of insulin and Kayexalate 1/26/21  Potassium improved and is currently within normal limits    Sepsis due to COVID-19 [facility])  Assessment & Plan  Leukocytosis and tachypneic on admission in setting of COVID pneumonia  Treatment as above    Essential hypertension  Assessment & Plan  Home regimen amlodipine 5 mg daily  /64    Hx of CABG  Assessment & Plan  History of CAD status post CABG and stents  Not on aspirin or beta blockers  Continue Imdur and statin    Type 2 diabetes mellitus, without long-term current use of insulin (HCC)  Assessment & Plan  Lab Results   Component Value Date    HGBA1C 8 0 (H) 11/04/2021   Improving appetite, hyperglycemia secondary to steroids  Increased Lantus to 30 units hs, humalog 10 units with meals  Still with hyperglycemia in the 400s 11/23   - increased to humalog 15 units TID --> 20 units TID   - increased to lantus 35 units HS --> 40 units HS  Blood sugars better today in the 100s to 200s        VTE Pharmacologic Prophylaxis:   Pharmacologic: Heparin  Mechanical VTE Prophylaxis in Place: Yes    Discharge Plan: With need for continued inpatient stay for COVID in oxygen requirements    Discussions with Specialists or Other Care Team Provider:  Nursing    Education and Discussions with Family / Patient:  Patient, son via telephone-left voicemail    Time Spent for Care: 30 minutes    More than 50% of total time spent on counseling and coordination of care as described above  Current Length of Stay: 8 day(s)  Current Patient Status: Inpatient   Code Status: Level 1 - Full Code    Subjective:   Resting in bed  Seen and evaluated earlier during rounds  Again complains of a headache  Reports he has had little appetite today  Also paged by nurse later after having seen the patient was complaining of some chest pain/pressure  Additionally she needed to increased oxygen back to 15 L mid flow  New orders placed  Objective:     Vitals:   Temp (24hrs), Av 7 °F (35 9 °C), Min:94 5 °F (34 7 °C), Max:98 °F (36 7 °C)    Temp:  [94 5 °F (34 7 °C)-98 °F (36 7 °C)] 97 7 °F (36 5 °C)  HR:  [] 101  Resp:  [19-22] 22  BP: (118-136)/(53-65) 135/64  SpO2:  [91 %-100 %] 92 %  Body mass index is 38 3 kg/m²  Input and Output Summary (last 24 hours): Intake/Output Summary (Last 24 hours) at 2021 1246  Last data filed at 2021  Gross per 24 hour   Intake 120 ml   Output 875 ml   Net -755 ml       Physical Exam:     Physical Exam  Vitals and nursing note reviewed  Constitutional:       General: He is not in acute distress  Appearance: Normal appearance  He is normal weight  He is ill-appearing  He is not toxic-appearing or diaphoretic  HENT:      Head: Normocephalic and atraumatic  Eyes:      General: No scleral icterus  Cardiovascular:      Rate and Rhythm: Normal rate and regular rhythm  Pulmonary:      Effort: Pulmonary effort is normal  Tachypnea present  Breath sounds: Normal breath sounds  No stridor  No wheezing or rhonchi  Comments: On 7 L mid flow  Abdominal:      General: Bowel sounds are normal  There is no distension  Palpations: Abdomen is soft  There is no mass  Tenderness: There is no abdominal tenderness  Hernia: No hernia is present  Musculoskeletal:         General: No swelling  Cervical back: Neck supple  Skin:     General: Skin is warm and dry     Neurological:      Mental Status: He is alert and oriented to person, place, and time  Mental status is at baseline  Psychiatric:         Mood and Affect: Mood normal          Behavior: Behavior normal          Additional Data:     Labs:    Results from last 7 days   Lab Units 11/27/21  0439 11/25/21  0613 11/24/21  0452   WBC Thousand/uL 12 60*   < > 15 05*   HEMOGLOBIN g/dL 10 6*   < > 13 0   HEMATOCRIT % 31 8*   < > 39 6   PLATELETS Thousands/uL 184   < > 180   NEUTROS PCT %  --   --  80*   LYMPHS PCT %  --   --  6*   MONOS PCT %  --   --  8   EOS PCT %  --   --  0    < > = values in this interval not displayed  Results from last 7 days   Lab Units 11/27/21  0439 11/26/21 0447 11/26/21  0447   POTASSIUM mmol/L 4 3   < > 5 6*   CHLORIDE mmol/L 97*   < > 100   CO2 mmol/L 27   < > 30   BUN mg/dL 64*   < > 49*   CREATININE mg/dL 2 46*   < > 2 15*   CALCIUM mg/dL 7 7*   < > 8 1*   ALK PHOS U/L  --   --  231*   ALT U/L  --   --  68   AST U/L  --   --  118*    < > = values in this interval not displayed  * I Have Reviewed All Lab Data Listed Above  * Additional Pertinent Lab Tests Reviewed: Luis Antonio 64 Admission Reviewed    Imaging:    Imaging Reports Reviewed Today Include:   Imaging Personally Reviewed by Myself Includes:      Recent Cultures (last 7 days):     Results from last 7 days   Lab Units 11/22/21  1801 11/22/21  1800   BLOOD CULTURE  No Growth After 4 Days  No Growth After 4 Days         Last 24 Hours Medication List:   Current Facility-Administered Medications   Medication Dose Route Frequency Provider Last Rate    amLODIPine  5 mg Oral Daily CIELO Judge      atorvastatin  40 mg Oral HS Yfn Ramos MD      Baricitinib  2 mg Oral Q24H Sasha Valdez MD      butalbital-acetaminophen-caffeine  1 tablet Oral Once PRN Maida Holder PA-C      cefazolin  2,000 mg Intravenous Q8H Polina Silva MD 2,000 mg (11/27/21 1156)    dexamethasone  0 1 mg/kg Intravenous Q12H [address] [place] CIELO Guan 11 1 mg (11/27/21 0815)    guaiFENesin  600 mg Oral Q12H [address] Yfn Cabrera MD      heparin (porcine)  5,000 Units Subcutaneous [facility] Ashkan Zhang MD      hydrALAZINE  5 mg Intravenous Q6H PRN CIELO Gomes      hydrocodone-chlorpheniramine polistirex  5 mL Oral Q12H PRN Ashkan Zhang MD      hydrOXYzine HCL  25 mg Oral Q6H PRN CIELO Gomes      insulin glargine  40 Units Subcutaneous HS Mable Lieberman PA-C      insulin lispro  1-5 Units Subcutaneous TID AC Ashkan Zhang MD      insulin lispro  20 Units Subcutaneous TID With Meals Bambi Koroma PA-C      isosorbide mononitrate  60 mg Oral QAM JasminCIELO Cancino      Lidocaine Viscous HCl  15 mL Swish & Spit 4x Daily PRN CIELO Gomse      loratadine  10 mg Oral Daily Ashkan Zhang MD      melatonin  6 mg Oral HS CIELO Gomes      oxyCODONE  5 mg Oral Q4H PRN Ashkan Zhang MD      pantoprazole  40 mg Oral Early Morning Ashkan Zhang MD      phenol  1 spray Mouth/Throat Q2H PRN Ashkan Zhang MD      senna-docusate sodium  1 tablet Oral BID Bambi Koroma PA-C      sodium bicarbonate  650 mg Oral Daily LeeCIELO Campos      sodium chloride  1 spray Each Nare Q1H PRN Ashkan Zhang MD      tiZANidine  4 mg Oral Q8H PRN Ashkan Zhang MD          Today, Patient Was Seen By: Bambi Koroma PA-C    ** Please Note: This note has been constructed using a voice recognition system   **

## 2021-11-27 NOTE — ASSESSMENT & PLAN NOTE
Results from last 7 days   Lab Units 11/27/21  0439 11/26/21  0447 11/25/21  0613 11/24/21  0452   CREATININE mg/dL 2 46* 2 15* 1 98* 1 91*   Previous lab shows patient's baseline creatinine roughly around 1 8-2  Peak Cr 2 70- Likely prerenal in setting of COVID, poor p o   Intake, mildly elevated CK  Nephrology following-creatinine has improved since admission  Nephrology gave 1 time dose of IV lasix 40 mg x1 on 11/25  Will await further Nephrology recommendations the setting of rising creatinine

## 2021-11-28 PROBLEM — I73.9 PERIPHERAL VASCULAR DISEASE, UNSPECIFIED (HCC): Status: RESOLVED | Noted: 2019-09-17 | Resolved: 2021-01-01

## 2021-11-28 PROBLEM — I46.9 CARDIAC ARREST (HCC): Status: ACTIVE | Noted: 2021-01-01

## 2021-11-28 PROBLEM — E87.5 HYPERKALEMIA: Status: RESOLVED | Noted: 2021-01-01 | Resolved: 2021-01-01

## 2021-11-28 PROBLEM — N18.30 ACUTE RENAL FAILURE SUPERIMPOSED ON STAGE 3 CHRONIC KIDNEY DISEASE (HCC): Status: ACTIVE | Noted: 2021-01-01

## 2021-11-28 NOTE — PROGRESS NOTES
I spoke with the patient's son, Alicia Argueta, over the phone and updated him on the night's events  I explained that unfortunately his father has a poor prognosis  I did allow Alicia Argueta and his brother to come into the hospital and see the patient from outside of the room  Alicia Argueta states that he would like his father to remain full code despite his poor prognosis

## 2021-11-28 NOTE — QUICK NOTE
Patient involved in code blue in the early hours of 11/28/2021  I personally called this patient's son who is point of care contact in epic facesheet while code blue was happening in order to report an update  I was able to get in touch with the son to tell him about code blue, chest compressions, intubation, as well as a etiology for decompensation uncertain at this time but patient will be moved to the ICU, attempt to be stabilized and further investigate and treat etiology for decompensation  Patient asked to speak with his father on the phone and I educate family member that this patient is unresponsive and intubated  Son requests phone call updates throughout the night if the father's medical status should change  Also requests a phone call from ICU staff once more information is available about the patient  Continue Level 1 status  Son expresses understanding and had all of his questions answered to satisfaction

## 2021-11-28 NOTE — ASSESSMENT & PLAN NOTE
· Per MS nurse-The patient developed hypoxia 60s  He was initially responsive then developed bradycardia and progressed to asystole  Code blue was called (see code sheet for details) a total of 3 rounds of epinephrine were administered before ROSC was achieved  He again developed hypotension with loss of pulses in the ICU  Two rounds of epinephrine were administered before ROSC was achieved  He received multiple boluses of sodium bicarbonate  Post arrest current continuous IV medications:  Levophed 30 mcg, vaso 0 04, epinephrine 10 mcg, heparin GTT  · A bedside echocardiogram was performed by myself showing an approximate EF 20%  Echocardiogram performed 11/27 EF 67% severe left hypertrophy  · I spoke with Cardiology and my critical care attending post cardiac arrest   All EKGs were reviewed by Cardiology showing inferior ST changes  He also developed 2nd degree heart block    Dr Sebastian Spencer stated that this appears to be asystolic cardiac arrest

## 2021-11-28 NOTE — PROCEDURES
Central Line Insertion    Date/Time: 11/28/2021 5:50 AM  Performed by: Lucrecia Swenson PA-C  Authorized by: Lucrecia Swenson PA-C     Patient location:  ICU  Consent:     Consent obtained:  Emergent situation  Universal protocol:     Procedure explained and questions answered to patient or proxy's satisfaction: yes      Patient identity confirmed:  Provided demographic data and arm band  Pre-procedure details:     Hand hygiene: Hand hygiene performed prior to insertion      Sterile barrier technique: All elements of maximal sterile technique followed      Skin preparation:  ChloraPrep  Indications:     Central line indications: medications requiring central line, hemodynamic monitoring and no peripheral vascular access    Procedure details:     Location:  Right internal jugular    Vessel type: vein      Laterality:  Right    Approach: percutaneous technique used      Patient position:  Flat    Catheter type:  Triple lumen 16cm    Catheter size:  7 Fr    Landmarks identified: yes      Ultrasound guidance: yes      Ultrasound image availability:  Not obtained due to urgency    Sterile ultrasound techniques: Sterile gel and sterile probe covers were used      Number of attempts:  1    Successful placement: yes    Post-procedure details:     Post-procedure:  Line sutured and dressing applied    Assessment:  Blood return through all ports, no pneumothorax on x-ray, placement verified by x-ray and free fluid flow    Post-procedure complications: none      Patient tolerance of procedure:   Tolerated well, no immediate complications

## 2021-11-28 NOTE — ASSESSMENT & PLAN NOTE
· 1/2 blood cultures on 11/19/21 positive for Staph aureus, Staph epidermidis and gram positive cocci in clusters  Infectious disease service following  Continue Ancef and vancomycin  Will repeat procalcitonin level  · Repeat blood cultures NGTD X 5 days

## 2021-11-28 NOTE — ANESTHESIA PROCEDURE NOTES
Emergent Intubation  Date/Time: 11/28/2021 1:00 AM  Urgency: emergent    Airway not difficult    General Information and Staff    Patient location during procedure: armenta  Resident/CRNA: Alfonso Pac, CRNA  Performed: CRNA     Consent for Airway (if performed for an anesthetic, see related documentation for consents)  Patient identity confirmed: arm band  Consent: No emergent situation  Verbal consent not obtained  Written consent not obtained    Risks and benefits: risks, benefits and alternatives were not discussed      Indications and Patient Condition  Indications for airway management: cardio/pulmonary arrest  Spontaneous Ventilation: absent  Preoxygenated: yes  Patient position: sniffing  MILS maintained throughout  Mask difficulty assessment: 1 - vent by mask    Final Airway Details  Final airway type: endotracheal airway      Successful airway: cuffed  Cuffed: yes   Successful intubation technique: video laryngoscopy  Endotracheal tube insertion site: oral  Blade: GlideScope  Blade size: #3  Cormack-Lehane Classification: grade I - full view of glottis  Placement verified by: chest auscultation and capnometry   Measured from: lips  ETT to lips (cm): 23  Number of attempts at approach: 1

## 2021-11-28 NOTE — PROGRESS NOTES
[address] [facility]  Progress Note - [facility] 1955, 74 y o  male MRN: [id number]  Unit/Bed#: ICU 10 Encounter: [id number]  Primary Care Provider: Ronnie Mar MD   Date and time admitted to hospital: 11/19/2021  6:27 AM    * Acute hypoxemic respiratory failure due to COVID-19 [facility])  Assessment & Plan  · Patient presented to the hospital on 11/19/2021 with shortness of breath and hypoxia  Positive COVID-19 test prior to admission 11/17/2021  · The patient was started on the moderate COVID-19 pathway upon admission  He was on mid flow 15 mL prior to cardiac arrest   The patient was intubated during the code  Ventilator settings AC/VC -100%-6  Adjust settings as needed her ABG  · P r n  Nebulizer treatments for wheezing  · The patient was transferred to critical care service post cardiac arrest     [facility]  Assessment & Plan  · Upon admission the patient was placed on the mild-moderate COVID-19 pathway  · He completed a full course of Rocephin and azithromycin  He is now on Ancef and vancomycin for Gram-positive bacteremia  ID Service following  · He completed a full course of remdesivir  · Baricitrinib day 8/14  · Continue dexamethasone 0 1 mg/kg q 12 hours  · Titrate FiO2 for goal sats greater than 88%  Cardiac arrest [facility])  Assessment & Plan  · Per MS nurse-The patient developed hypoxia 60s  He was initially responsive then developed bradycardia and progressed to asystole  Code blue was called (see code sheet for details) a total of 3 rounds of epinephrine were administered before ROSC was achieved  He again developed hypotension with loss of pulses in the ICU  Two rounds of epinephrine were administered before ROSC was achieved  He received multiple boluses of sodium bicarbonate  Post arrest current continuous IV medications:  Levophed 30 mcg, vaso 0 04, epinephrine 10 mcg, heparin GTT    · A bedside echocardiogram was performed by myself showing an approximate EF 20%  Echocardiogram performed 11/27 EF 67% severe left hypertrophy  · I spoke with Cardiology and my critical care attending post cardiac arrest   All EKGs were reviewed by Cardiology showing inferior ST changes  He also developed 2nd degree heart block  Dr Ruy Hamilton stated that this appears to be asystolic cardiac arrest     Acute renal failure superimposed on stage 3 chronic kidney disease [facility])  Assessment & Plan  Lab Results   Component Value Date    EGFR 26 11/27/2021    EGFR 31 11/26/2021    EGFR 34 11/25/2021    CREATININE 2 46 (H) 11/27/2021    CREATININE 2 15 (H) 11/26/2021    CREATININE 1 98 (H) 11/25/2021     · The patient has a history of CKD 3 baseline CR 1 8-2 1  The CR has been worsening with hyperkalemia at times  Post-arrest CMP pending  · Electrolyte replacement as needed  Sodium bicarbonate administered  Await BMP as patient will most likely need a sodium bicarbonate continuous drip  Elevated troponin  Assessment & Plan  · Rising troponin levels now 337  The patient was started on a heparin GTT  Cardiology was consulted  I spoke directly with Alon Johnson reviewed all EKGs and the patient's chart  He states that the patient is not a candidate for any type of cardiac intervention  · Continue to monitor troponin levels  Type 2 diabetes mellitus, without long-term current use of insulin [facility])  Assessment & Plan  Lab Results   Component Value Date    HGBA1C 8 0 (H) 11/04/2021       Recent Labs     11/27/21  1049 11/27/21  1603 11/27/21  2040 11/28/21  0032   POCGLU 225* 173* 126 107       Blood Sugar Average: Last 72 hrs:  (P) 855 [id number]     · Post cardiac arrest the patient has developed hypoglycemia  He has received a total of 2 amps of dextrose  Will monitor and start a dextrose infusion if patient remains hypoglycemic      Gram-positive bacteremia  Assessment & Plan  · 1/2 blood cultures on 11/19/21 positive for Staph aureus, Staph epidermidis and gram positive cocci in clusters  Infectious disease service following  Continue Ancef and vancomycin  Will repeat procalcitonin level  · Repeat blood cultures NGTD X 5 days  Essential hypertension  Assessment & Plan  · Hold all antihypertensive medications secondary to hypotension  Hx of CABG  Assessment & Plan  · History of CABG and PCI stenting  · Continue aspirin and heparin GTT  Hold beta-blocker secondary to hypotension  · Cardiology consulted and following     ----------------------------------------------------------------------------------------  HPI/24hr events:  Cardiac arrest X 2 with transfer to the ICU      Disposition: Continue Critical Care   Code Status: Level 1 - Full Code  ---------------------------------------------------------------------------------------  SUBJECTIVE  Patient is intubated and unresponsive    Review of Systems  Patient is intubated in unresponsive  ---------------------------------------------------------------------------------------  OBJECTIVE    Vitals   Vitals:    21 0400 21 0415 21 0430 21 0445   BP:       Pulse: 86 78 76 80   Resp: (!) 25 20 (!) 38 19   Temp:       TempSrc:       SpO2: 100% 96%  100%   Weight:       Height:         Temp (24hrs), Av 9 °F (35 5 °C), Min:92 5 °F (33 6 °C), Max:97 7 °F (36 5 °C)  Current: Temperature: (!) 92 5 °F (33 6 °C)  Arterial Line BP: 72/36  Arterial Line MAP (mmHg): 48 mmHg    Respiratory:  SpO2: SpO2: 100 %, SpO2 Activity: SpO2 Activity: At Rest, SpO2 Device: O2 Device: Mid flow nasal cannula  Nasal Cannula O2 Flow Rate (L/min): 8 L/min    Invasive/non-invasive ventilation settings   Respiratory  Report   Lab Data (Last 4 hours)       0415            pH, Arterial       7 033             pCO2, Arterial       57 9             pO2, Arterial       191 9             HCO3, Arterial       15 1             Base Excess, Arterial       -14 3                  O2/Vent Data Most Recent         Vent Mode   AC/VC      Resp Rate (BPM) (BPM)   20      Vt (mL) (mL)   450      FIO2 (%) (%)   50      PEEP (cmH2O) (cmH2O)   6      MV   9                  Physical Exam  Vitals and nursing note reviewed  Constitutional:       General: He is in acute distress  Appearance: He is ill-appearing and toxic-appearing  HENT:      Head: Normocephalic and atraumatic  Nose: Nose normal  No congestion or rhinorrhea  Mouth/Throat:      Mouth: Mucous membranes are moist       Comments: Endotracheal tube in place  Eyes:      General: No scleral icterus  Right eye: No discharge  Left eye: No discharge  Extraocular Movements:      Right eye: Abnormal extraocular motion present  Left eye: Abnormal extraocular motion present  Conjunctiva/sclera: Conjunctivae normal       Comments: Right pupil fixed and dilated, unresponsive to light  Left pupil minimally responsive  Cardiovascular:      Rate and Rhythm: Tachycardia present  Rhythm irregular  Heart sounds: Normal heart sounds  No murmur heard  No friction rub  No gallop  Pulmonary:      Comments: On ventilator  Course  Abdominal:      General: Bowel sounds are normal  There is distension  Palpations: Abdomen is soft  Tenderness: There is no abdominal tenderness  There is no guarding or rebound  Musculoskeletal:      Right lower leg: No edema  Left lower leg: No edema  Skin:     Capillary Refill: Capillary refill takes 2 to 3 seconds  Coloration: Skin is pale  Findings: Bruising present  Comments: Skin cool  Neurological:      Comments: Patient is unresponsive to verbal or painful stimuli  Positive gag and over breathing of the vent  Pupils on the equal and unresponsive to light               Laboratory and Diagnostics:  Results from last 7 days   Lab Units 11/27/21  0439 11/26/21  0447 11/25/21  0613 11/24/21  0452 11/23/21  0446 11/22/21  0548   WBC Thousand/uL 12 60* 6 93 8 94 15 05* 11 93* 17 07*   HEMOGLOBIN g/dL 10 6* 12 1 11 9* 13 0 12 3 12 8   HEMATOCRIT % 31 8* 36 5 36 4* 39 6 35 1* 40 5   PLATELETS Thousands/uL 184 181 164 180 154 152   NEUTROS PCT %  --   --   --  80*  --  88*   BANDS PCT %  --   --   --   --  1  --    MONOS PCT %  --   --   --  8  --  4   MONO PCT %  --   --   --   --  5  --      Results from last 7 days   Lab Units 11/27/21  0439 11/26/21  0447 11/25/21  0613 11/24/21  0452 11/23/21  0446 11/22/21  0548   SODIUM mmol/L 134* 136 136 140 134* 136   POTASSIUM mmol/L 4 3 5 6* 5 4* 4 6 5 1 4 8   CHLORIDE mmol/L 97* 100 103 104 100 101   CO2 mmol/L 27 30 26 24 22 21   ANION GAP mmol/L 10 6 7 12 12 14*   BUN mg/dL 64* 49* 47* 47* 53* 62*   CREATININE mg/dL 2 46* 2 15* 1 98* 1 91* 2 12* 2 48*   CALCIUM mg/dL 7 7* 8 1* 8 1* 8 4 8 2* 8 7   GLUCOSE RANDOM mg/dL 196* 280* 320* 116 415* 228*   ALT U/L  --  68 80*  --   --   --    AST U/L  --  118* 120*  --   --   --    ALK PHOS U/L  --  231* 227*  --   --   --    ALBUMIN g/dL  --  2 1* 2 0*  --   --   --    TOTAL BILIRUBIN mg/dL  --  1 11* 1 44*  --   --   --      Results from last 7 days   Lab Units 11/28/21  0113   MAGNESIUM mg/dL 3 2*   PHOSPHORUS mg/dL 14 6*      Results from last 7 days   Lab Units 11/28/21  0349 11/28/21  0113   INR   --  1 68*   PTT seconds 108* 121*          Results from last 7 days   Lab Units 11/28/21  0332 11/28/21  0121   LACTIC ACID mmol/L 19 5* 19 3*     ABG:  Results from last 7 days   Lab Units 11/28/21  0415   PH ART  7 033*   PCO2 ART mm Hg 57 9*   PO2 ART mm Hg 191 9*   HCO3 ART mmol/L 15 1*   BASE EXC ART mmol/L -14 3   ABG SOURCE  Line, Arterial     VBG:  Results from last 7 days   Lab Units 11/28/21  0415   ABG SOURCE  Line, Arterial     Results from last 7 days   Lab Units 11/22/21  0548   PROCALCITONIN ng/ml 11 69*       Micro  Results from last 7 days   Lab Units 11/22/21  1801 11/22/21  1800   BLOOD CULTURE  No Growth After 5 Days  No Growth After 5 Days         EKG: Reviewed  Imaging: I have personally reviewed pertinent reports  and I have personally reviewed pertinent films in PACS    Intake and Output  I/O       11/26 0701 11/27 0700 11/27 0701 11/28 0700    P  O  240     I V  (mL/kg)  332 5 (3 5)    Total Intake(mL/kg) 240 (2 4) 332 5 (3 5)    Urine (mL/kg/hr) 875 (0 4) 10 (0)    Total Output 875 10    Net -635 +322 5                Height and Weights   Height: 5' 4" (162 6 cm)  IBW (Ideal Body Weight): 59 2 kg  Body mass index is 36 44 kg/m²  Weight (last 2 days)     Date/Time Weight    11/28/21 0103 96 3 (212 3)    11/27/21 0600 101 (223 11)    11/26/21 0543 101 (222 66)            Nutrition       Diet Orders   (From admission, onward)             Start     Ordered    11/28/21 0100  Diet NPO  Diet effective now        References:    Nutrtion Support Algorithm Enteral vs  Parenteral   Question Answer Comment   Diet Type NPO    RD to adjust diet per protocol?  Yes        11/28/21 0102                  Active Medications  Scheduled Meds:  Current Facility-Administered Medications   Medication Dose Route Frequency Provider Last Rate    atorvastatin  40 mg Oral HS KEITH Perkins-C      Baricitinib  2 mg Oral Q24H Renee Danielle PA-C      cefazolin  2,000 mg Intravenous Q8H Alessandra Tyson, PA-C 2,000 mg (11/28/21 0426)    chlorhexidine  15 mL Mouth/Throat Q12H [address] Alessandra Tyson, PA-C      dexamethasone  0 1 mg/kg Intravenous Q12H [address] Alessandra Tyson, PA-C Stopped (11/27/21 2056)    epinephrine  1-10 mcg/min Intravenous Titrated Alessandra Tyson PA-C 10 mcg/min (11/28/21 0439)    heparin (porcine)  3-20 Units/kg/hr (Order-Specific) Intravenous Titrated Renee Danielle PA-C 11 1 Units/kg/hr (11/28/21 0423)    heparin (porcine)  2,000 Units Intravenous Q1H PRN KEITH Greenwood-TRESSA      heparin (porcine)  4,000 Units Intravenous Q1H PRN Renee Danielle PA-C      hydrALAZINE  5 mg Intravenous Q6H PRN Renee Danielle PA-C      insulin lispro  1-5 Units Subcutaneous Q6H [address]      norepinephrine  1-30 mcg/min Intravenous Titrated Kris Castro, PA-C 30 mcg/min (11/28/21 0350)    omeprazole (PRILOSEC) suspension 2 mg/mL  20 mg Oral Daily Kingsley Castro, PA-C      propofol  10-50 mcg/kg/min Intravenous Titrated Kris Castro, PA-C Stopped (11/28/21 0431)    vancomycin  20 mg/kg (Adjusted) Intravenous Once Kingsley Castro, PA-C      [START ON 11/29/2021] vancomycin  15 mg/kg (Adjusted) Intravenous Q24H Kris Castro, PA-C      vasopressin  0 04 Units/min Intravenous Continuous Kingsley Castro, PA-C 0 04 Units/min (11/28/21 0208)     Continuous Infusions:  epinephrine, 1-10 mcg/min, Last Rate: 10 mcg/min (11/28/21 0439)  heparin (porcine), 3-20 Units/kg/hr (Order-Specific), Last Rate: 11 1 Units/kg/hr (11/28/21 0423)  norepinephrine, 1-30 mcg/min, Last Rate: 30 mcg/min (11/28/21 0350)  propofol, 10-50 mcg/kg/min, Last Rate: Stopped (11/28/21 0431)  vasopressin, 0 04 Units/min, Last Rate: 0 04 Units/min (11/28/21 0208)      PRN Meds:   heparin (porcine), 2,000 Units, Q1H PRN  heparin (porcine), 4,000 Units, Q1H PRN  hydrALAZINE, 5 mg, Q6H PRN        Invasive Devices Review  Invasive Devices  Report    Peripheral Intravenous Line            Peripheral IV 11/24/21 Right Antecubital 3 days          Drain            NG/OG/Enteral Tube Orogastric Center mouth <1 day    Urethral Catheter <1 day          Airway            ETT  Cuffed <1 day                Rationale for remaining devices:  Critically ill patient  ---------------------------------------------------------------------------------------  Advance Directive and Living Will:      Power of :    POLST:    ---------------------------------------------------------------------------------------  Care Time Delivered:   Upon my evaluation, this patient had a high probability of imminent or life-threatening deterioration due to COVID-19 pneumonia, Gram-positive bacteremia, cardiac arrest, renal failure, which required my direct attention, intervention, and personal management  I have personally provided 120 minutes of critical care time, exclusive of procedures, teaching, family meetings, and any prior time recorded by providers other than myself  Rj Patel PA-C      Portions of the record may have been created with voice recognition software  Occasional wrong word or "sound a like" substitutions may have occurred due to the inherent limitations of voice recognition software    Read the chart carefully and recognize, using context, where substitutions have occurred

## 2021-11-28 NOTE — PROGRESS NOTES
Vancomycin Assessment    Manju Dumont is a 74 y o  male who is currently ordered vancomycin via pharmacy consult service for bacteremia  Relevant clinical data and objective history reviewed:  Creatinine   Date Value Ref Range Status   11/27/2021 2 46 (H) 0 60 - 1 30 mg/dL Final     Comment:     Standardized to IDMS reference method   11/26/2021 2 15 (H) 0 60 - 1 30 mg/dL Final     Comment:     Standardized to IDMS reference method   11/25/2021 1 98 (H) 0 60 - 1 30 mg/dL Final     Comment:     Standardized to IDMS reference method     BP (!) 60/49   Pulse 88   Temp (!) 96 4 °F (35 8 °C)   Resp 16   Ht 5' 4" (1 626 m)   Wt 96 3 kg (212 lb 4 9 oz)   SpO2 98%   BMI 36 44 kg/m²   I/O last 3 completed shifts: In: 240 [P O :240]  Out: 875 [Urine:875]  Lab Results   Component Value Date/Time    BUN 64 (H) 11/27/2021 04:39 AM    BUN 29 (H) 06/21/2018 11:02 AM    WBC 12 60 (H) 11/27/2021 04:39 AM    WBC 5 4 06/21/2018 11:02 AM    HGB 10 6 (L) 11/27/2021 04:39 AM    HGB 13 8 06/21/2018 11:02 AM    HCT 31 8 (L) 11/27/2021 04:39 AM    HCT 42 0 06/21/2018 11:02 AM    MCV 76 (L) 11/27/2021 04:39 AM    MCV 78 (L) 06/21/2018 11:02 AM     11/27/2021 04:39 AM     (L) 06/21/2018 11:02 AM     Temp Readings from Last 3 Encounters:   11/27/21 (!) 96 4 °F (35 8 °C)   10/15/20 98 2 °F (36 8 °C)   10/01/20 97 5 °F (36 4 °C)     Vancomycin Days of Therapy: 1    Assessment/Plan  The patient is currently ordered vancomycin utilizing the pharmacy consult service  Baseline risks associated with therapy include: pre-existing renal impairment, concomitant nephrotoxic medications, and advanced age  After clinical evaluation, Vancomycin will be started at 1500mg IV x 1 dose, then 1000mg IV q24h  Pharmacy will also follow closely for s/sx of nephrotoxicity, infusion reactions, and appropriateness of therapy  BMP and CBC will be ordered per protocol    Plan for trough as patient approaches steady state, prior to the 4th dose at approximately 0300 on 12/1/21  Due to infection severity, will target a trough of 15-20 (appropriate for most indications)   Pharmacy will continue to follow the patients culture results and clinical progress daily      Amalia Horner, Pharmacist

## 2021-11-28 NOTE — ASSESSMENT & PLAN NOTE
Lab Results   Component Value Date    HGBA1C 8 0 (H) 11/04/2021       Recent Labs     11/27/21  1049 11/27/21  1603 11/27/21 2040 11/28/21  0032   POCGLU 225* 173* 126 107       Blood Sugar Average: Last 72 hrs:  (P) 547 [id number]     · Post cardiac arrest the patient has developed hypoglycemia  He has received a total of 2 amps of dextrose  Will monitor and start a dextrose infusion if patient remains hypoglycemic

## 2021-11-28 NOTE — PROGRESS NOTES
The patient again developed bradycardia that progressed into asystole  No palpable or dopplerable pulses  Compressions initiated with administration of epinephrine  The patient remained pulseless  The patient was pronounced   at 05:25

## 2021-11-28 NOTE — ASSESSMENT & PLAN NOTE
· History of CABG and PCI stenting  · Continue aspirin and heparin GTT  Hold beta-blocker secondary to hypotension  · Cardiology consulted and following

## 2021-11-28 NOTE — PROCEDURES
Arterial Line Insertion    Date/Time: 11/28/2021 5:52 AM  Performed by: Billie Hernández PA-C  Authorized by: Billie Hernández PA-C     Patient location:  ICU  Consent:     Consent obtained:  Emergent situation  Universal protocol:     Patient identity confirmed:  Arm band and provided demographic data  Indications:     Indications: hemodynamic monitoring, multiple ABGs, continuous blood pressure monitoring and frequent labs / infusion    Pre-procedure details:     Skin preparation:  Betadine    Preparation: Patient was prepped and draped in sterile fashion    Anesthesia (see MAR for exact dosages): Anesthesia method:  None  Procedure details:     Location / Tip of Catheter:  Radial    Laterality:  Right    Aydin's test performed: yes      Aydin's test abnormal: no      Needle gauge:  18 G    Placement technique:  Seldinger and ultrasound guided    Sterile ultrasound techniques: Sterile gel and sterile probe covers were used      Number of attempts:  1    Successful placement: yes      Transducer: waveform confirmed    Post-procedure details:     Post-procedure:  Sterile dressing applied and sutured    CMS:  Unable to assess    Patient tolerance of procedure:   Tolerated well, no immediate complications

## 2021-11-28 NOTE — ASSESSMENT & PLAN NOTE
· Patient presented to the hospital on 11/19/2021 with shortness of breath and hypoxia  Positive COVID-19 test prior to admission 11/17/2021  · The patient was started on the moderate COVID-19 pathway upon admission  He was on mid flow 15 mL prior to cardiac arrest   The patient was intubated during the code  Ventilator settings AC/VC -100%-6  Adjust settings as needed her ABG  · P r n  Nebulizer treatments for wheezing    · The patient was transferred to critical care service post cardiac arrest

## 2021-11-28 NOTE — PLAN OF CARE
Problem: PAIN - ADULT  Goal: Verbalizes/displays adequate comfort level or baseline comfort level  Description: Interventions:  - Encourage patient to monitor pain and request assistance  - Assess pain using appropriate pain scale  - Administer analgesics based on type and severity of pain and evaluate response  - Implement non-pharmacological measures as appropriate and evaluate response  - Consider cultural and social influences on pain and pain management  - Notify physician/advanced practitioner if interventions unsuccessful or patient reports new pain  Outcome: Progressing     Problem: INFECTION - ADULT  Goal: Absence or prevention of progression during hospitalization  Description: INTERVENTIONS:  - Assess and monitor for signs and symptoms of infection  - Monitor lab/diagnostic results  - Monitor all insertion sites, i e  indwelling lines, tubes, and drains  - Monitor endotracheal if appropriate and nasal secretions for changes in amount and color  - [place] appropriate cooling/warming therapies per order  - Administer medications as ordered  - Instruct and encourage patient and family to use good hand hygiene technique  - Identify and instruct in appropriate isolation precautions for identified infection/condition  Outcome: Progressing     Problem: SAFETY ADULT  Goal: Patient will remain free of falls  Description: INTERVENTIONS:  - Educate patient/family on patient safety including physical limitations  - Instruct patient to call for assistance with activity   - Consult OT/PT to assist with strengthening/mobility   - Keep Call bell within reach  - Keep bed low and locked with side rails adjusted as appropriate  - Keep care items and personal belongings within reach  - Initiate and maintain comfort rounds  - Make Fall Risk Sign visible to staff  - Apply yellow socks and bracelet for high fall risk patients  - Consider moving patient to room near nurses station  Outcome: Progressing  Goal: Maintain or return to baseline ADL function  Description: INTERVENTIONS:  -  Assess patient's ability to carry out ADLs; assess patient's baseline for ADL function and identify physical deficits which impact ability to perform ADLs (bathing, care of mouth/teeth, toileting, grooming, dressing, etc )  - Assess/evaluate cause of self-care deficits   - Assess range of motion  - Assess patient's mobility; develop plan if impaired  - Assess patient's need for assistive devices and provide as appropriate  - Encourage maximum independence but intervene and supervise when necessary  - Involve family in performance of ADLs  - Assess for home care needs following discharge   - Consider OT consult to assist with ADL evaluation and planning for discharge  - Provide patient education as appropriate  Outcome: Progressing  Goal: Maintains/Returns to pre admission functional level  Description: INTERVENTIONS:  - Perform BMAT or MOVE assessment daily    - Set and communicate daily mobility goal to care team and patient/family/caregiver  - Collaborate with rehabilitation services on mobility goals if consulted  - Perform Range of Motion 3 times a day  - Reposition patient every 2 hours  - Dangle patient 3 times a day  - Stand patient 3 times a day  - Ambulate patient 3 times a day  - Out of bed to chair 3 times a day   - Out of bed for meals 3 times a day  - Out of bed for toileting  - Record patient progress and toleration of activity level   Outcome: Progressing     Problem: Knowledge Deficit  Goal: Patient/family/caregiver demonstrates understanding of disease process, treatment plan, medications, and discharge instructions  Description: Complete learning assessment and assess knowledge base    Interventions:  - Provide teaching at level of understanding  - Provide teaching via preferred learning methods  Outcome: Progressing     Problem: RESPIRATORY - ADULT  Goal: Achieves optimal ventilation and oxygenation  Description: INTERVENTIONS:  - Assess for changes in respiratory status  - Assess for changes in mentation and behavior  - Position to facilitate oxygenation and minimize respiratory effort  - Oxygen administered by appropriate delivery if ordered  - Initiate smoking cessation education as indicated  - Encourage broncho-pulmonary hygiene including cough, deep breathe, Incentive Spirometry  - Assess the need for suctioning and aspirate as needed  - Assess and instruct to report SOB or any respiratory difficulty  - Respiratory Therapy support as indicated  Outcome: Progressing     Problem: CARDIOVASCULAR - ADULT  Goal: Maintains optimal cardiac output and hemodynamic stability  Description: INTERVENTIONS:  - Monitor I/O, vital signs and rhythm  - Monitor for S/S and trends of decreased cardiac output  - Administer and titrate ordered vasoactive medications to optimize hemodynamic stability  - Assess quality of pulses, skin color and temperature  - Assess for signs of decreased coronary artery perfusion  - Instruct patient to report change in severity of symptoms  Outcome: Progressing     Problem: GENITOURINARY - ADULT  Goal: Maintains or returns to baseline urinary function  Description: INTERVENTIONS:  - Assess urinary function  - Encourage oral fluids to ensure adequate hydration if ordered  - Administer IV fluids as ordered to ensure adequate hydration  - Administer ordered medications as needed  - Offer frequent toileting  - Follow urinary retention protocol if ordered  Outcome: Progressing     Problem: METABOLIC, FLUID AND ELECTROLYTES - ADULT  Goal: Electrolytes maintained within normal limits  Description: INTERVENTIONS:  - Monitor labs and assess patient for signs and symptoms of electrolyte imbalances  - Administer electrolyte replacement as ordered  - Monitor response to electrolyte replacements, including repeat lab results as appropriate  - Instruct patient on fluid and nutrition as appropriate  Outcome: Progressing  Goal: Fluid balance maintained  Description: INTERVENTIONS:  - Monitor labs   - Monitor I/O and WT  - Instruct patient on fluid and nutrition as appropriate  - Assess for signs & symptoms of volume excess or deficit  Outcome: Progressing  Goal: Glucose maintained within target range  Description: INTERVENTIONS:  - Monitor Blood Glucose as ordered  - Assess for signs and symptoms of hyperglycemia and hypoglycemia  - Administer ordered medications to maintain glucose within target range  - Assess nutritional intake and initiate nutrition service referral as needed  Outcome: Progressing     Problem: MUSCULOSKELETAL - ADULT  Goal: Maintain or return mobility to safest level of function  Description: INTERVENTIONS:  - Assess patient's ability to carry out ADLs; assess patient's baseline for ADL function and identify physical deficits which impact ability to perform ADLs (bathing, care of mouth/teeth, toileting, grooming, dressing, etc )  - Assess/evaluate cause of self-care deficits   - Assess range of motion  - Assess patient's mobility  - Assess patient's need for assistive devices and provide as appropriate  - Encourage maximum independence but intervene and supervise when necessary  - Involve family in performance of ADLs  - Assess for home care needs following discharge   - Consider OT consult to assist with ADL evaluation and planning for discharge  - Provide patient education as appropriate  Outcome: Progressing     Problem: GASTROINTESTINAL - ADULT  Goal: Maintains or returns to baseline bowel function  Description: INTERVENTIONS:  - Assess bowel function  - Encourage oral fluids to ensure adequate hydration  - Administer IV fluids if ordered to ensure adequate hydration  - Administer ordered medications as needed  - Encourage mobilization and activity  - Consider nutritional services referral to assist patient with adequate nutrition and appropriate food choices  Outcome: Progressing        Problem: Potential for Falls  Goal: Patient will remain free of falls  Description: INTERVENTIONS:  - Educate patient/family on patient safety including physical limitations  - Instruct patient to call for assistance with activity   - Consult OT/PT to assist with strengthening/mobility   - Keep Call bell within reach  - Keep bed low and locked with side rails adjusted as appropriate  - Keep care items and personal belongings within reach  - Initiate and maintain comfort rounds  - Make Fall Risk Sign visible to staff  - Apply yellow socks and bracelet for high fall risk patients  - Consider moving patient to room near nurses station  Outcome: Progressing     Problem: Prexisting or High Potential for Compromised Skin Integrity  Goal: Skin integrity is maintained or improved  Description: INTERVENTIONS:  - Identify patients at risk for skin breakdown  - Assess and monitor skin integrity  - Assess and monitor nutrition and hydration status  - Monitor labs   - Assess for incontinence   - Turn and reposition patient  - Assist with mobility/ambulation  - Relieve pressure over bony prominences  - Avoid friction and shearing  - Provide appropriate hygiene as needed including keeping skin clean and dry  - Evaluate need for skin moisturizer/barrier cream  - Collaborate with interdisciplinary team   - Patient/family teaching  - Consider wound care consult   Outcome: Progressing     Problem: MOBILITY - ADULT  Goal: Maintain or return to baseline ADL function  Description: INTERVENTIONS:  -  Assess patient's ability to carry out ADLs; assess patient's baseline for ADL function and identify physical deficits which impact ability to perform ADLs (bathing, care of mouth/teeth, toileting, grooming, dressing, etc )  - Assess/evaluate cause of self-care deficits   - Assess range of motion  - Assess patient's mobility; develop plan if impaired  - Assess patient's need for assistive devices and provide as appropriate  - Encourage maximum independence but intervene and supervise when necessary  - Involve family in performance of ADLs  - Assess for home care needs following discharge   - Consider OT consult to assist with ADL evaluation and planning for discharge  - Provide patient education as appropriate  Outcome: Progressing  Goal: Maintains/Returns to pre admission functional level  Description: INTERVENTIONS:  - Perform BMAT or MOVE assessment daily    - Set and communicate daily mobility goal to care team and patient/family/caregiver  - Collaborate with rehabilitation services on mobility goals if consulted  - Perform Range of Motion 3 times a day  - Reposition patient every 2 hours    - Dangle patient 3 times a day  - Stand patient 3 times a day  - Ambulate patient 3 times a day  - Out of bed to chair 2 times a day   - Out of bed for meals 3 times a day  - Out of bed for toileting  - Record patient progress and toleration of activity level   Outcome: Progressing

## 2021-11-28 NOTE — ASSESSMENT & PLAN NOTE
· Rising troponin levels now 337  The patient was started on a heparin GTT  Cardiology was consulted  I spoke directly with Mynor Young reviewed all EKGs and the patient's chart  He states that the patient is not a candidate for any type of cardiac intervention  · Continue to monitor troponin levels

## 2021-11-28 NOTE — ASSESSMENT & PLAN NOTE
Lab Results   Component Value Date    EGFR 26 11/27/2021    EGFR 31 11/26/2021    EGFR 34 11/25/2021    CREATININE 2 46 (H) 11/27/2021    CREATININE 2 15 (H) 11/26/2021    CREATININE 1 98 (H) 11/25/2021     · The patient has a history of CKD 3 baseline CR 1 8-2 1  The CR has been worsening with hyperkalemia at times  Post-arrest CMP pending  · Electrolyte replacement as needed  Sodium bicarbonate administered  Await BMP as patient will most likely need a sodium bicarbonate continuous drip

## 2021-11-28 NOTE — ASSESSMENT & PLAN NOTE
· Upon admission the patient was placed on the mild-moderate COVID-19 pathway  · He completed a full course of Rocephin and azithromycin  He is now on Ancef and vancomycin for Gram-positive bacteremia  ID Service following  · He completed a full course of remdesivir  · Baricitrinib day 8/14  · Continue dexamethasone 0 1 mg/kg q 12 hours  · Titrate FiO2 for goal sats greater than 88%

## 2021-11-28 NOTE — DISCHARGE SUMMARY
Discharge Summary - Williams Ayala 74 y o  male MRN: [id number]    Unit/Bed#: ICU 10 Encounter: [id number] PCP: More Conklin MD    Admission Date:   Admission Orders (From admission, onward)     Ordered        11/19/21 0820  Inpatient Admission  Once                        Admitting Diagnosis: Dyspnea [R06 00]  SOB (shortness of breath) [R06 02]  Hypoxia [R09 02]  Sepsis ([address] ) [A41 9]  Pneumonia due to COVID-19 virus [U07 1, J12 82]  COVID-19 [U07 1]    HPI:  Obtained from original HPI   77 y o  male who presents with shortness of breath and cough  Patient diagnosed with COVID pneumonia several days prior to admission  During that family has had all COVID  He was initially placed on 6 L oxygen in the ED but has continue to gone worse  He now requires 15 L of oxygen on non-rebreather  He reports symptoms of fevers, shortness of breath cough and body aches all over  He reports sputum production that has been greenish in reddish  He does have past medical history of type 2 diabetes, hypertension, CAD  Procedures Performed:   Orders Placed This Encounter   Procedures    Central Line       Summary of Hospital Course: The patient was admitted and diagnosed with COVID-19 pneumonia on 11/17/2021  He was placed on the COVID-19 moderate pathway and treated per guidelines  He was maintained on mid flow nasal cannula for hypoxia  The patient also developed chest pain with elevated troponin levels  Cardiology was consulted  On 11/27 the patient went in to cardiopulmonary arrest   Per the patient's nurse he developed hypoxia leading to bradycardia/asystole  Chest compressions were immediately initiated with administration of epinephrine to achieve ROSC (see code sheet for details)  A right internal jugular central line and radial a line were placed  He was maintained on maximum doses of Levophed, vasopressin and epinephrine    Post code the patient developed multiple arrhythmias and EKG changes including inferior ST elevation and depression, second-degree heart block, first-degree AV block  The patient's son, Pritesh Moctezuma, was notified of the events  Unfortunately the patient went into cardiac arrest 2 more times and we were unable to regain a pulse  Throughout these events I spoke with Cardiology who stated that the patient was not a candidate for any type of intervention  I also updated my critical care attending of the night's events  Significant Findings, Care, Treatment and Services Provided:     Complications:  None    Disposition:      Final Diagnosis:  Acute hypoxic respiratory failure, COVID pneumonia, Gram-positive cocci bacteremia, cardiac arrest, renal failure    Medical Problems             Resolved Problems  Date Reviewed: 2021          Resolved    Hyperkalemia 2021     Resolved by  Rj Patel PA-C                  Date, Time and Cause of Death    Date of Death: 21  Time of Death:  5:25 AM  Preliminary Cause of Death: Respiratory failure with hypoxia ([facility] [address] )  Entered by: Rj Patel PA-C[MM1 1]     Attribution     MM1 1 Rj Patel PA-C 21 05:40          Death Note:    INPATIENT DEATH NOTE  Antione Rank 74 y o  male MRN: [id number]  Unit/Bed#: ICU 10 Encounter: [id number]    Date, Time and Cause of Death    Date of Death: 21  Time of Death:  5:25 AM  Preliminary Cause of Death: Respiratory failure with hypoxia ([facility] [address] )  Entered by: Rj Patel PA-C[MM1 1]     Attribution     MM1 1 Rj Patel PA-C 21 05:40           Patient's Information  Pronounced by: Rj Patel PA-C  Did the patient's death occur in the ED?: No  Did the patient's death occur in the OR?: No  Did the patient's death occur less than 10 days post-op?: No  Did the patient's death occur within 24 hours of admission?: No  Was code status DNR at the time of death?: No    PHYSICAL EXAM:  Patient unresponsive to verbal or painful stimuli    Pupils unequal and unresponsive  Negative corneal reflex  Absent heart and lung sounds      Medical Examiner's office notified?:  Yes  Medical Examiner accepted case?:  No    Family Notification  Was the family notified?: Yes  Date Notified: 21  Notified by: Jaime Madden PA-C  Name of Family Notified of Death: Raymundo Leonard   Relationship to Patient: Son  Family Notification Route: Telephone  Was the family told to contact a  home?: Yes    Autopsy Options:  Family declined    Primary Service Attending Physician notified?:  Yes    Physician/Resident responsible for completing Discharge Summary:  Jaime Madden PA-C

## 2021-11-29 VITALS
SYSTOLIC BLOOD PRESSURE: 94 MMHG | DIASTOLIC BLOOD PRESSURE: 31 MMHG | OXYGEN SATURATION: 79 % | HEIGHT: 64 IN | WEIGHT: 212.3 LBS | BODY MASS INDEX: 36.25 KG/M2 | TEMPERATURE: 92.5 F

## 2021-11-29 LAB
ATRIAL RATE: 88 BPM
ATRIAL RATE: 89 BPM
P AXIS: 51 DEGREES
P AXIS: 52 DEGREES
PR INTERVAL: 162 MS
PR INTERVAL: 166 MS
QRS AXIS: -27 DEGREES
QRS AXIS: -30 DEGREES
QRSD INTERVAL: 82 MS
QRSD INTERVAL: 86 MS
QT INTERVAL: 380 MS
QT INTERVAL: 380 MS
QTC INTERVAL: 459 MS
QTC INTERVAL: 462 MS
T WAVE AXIS: 41 DEGREES
T WAVE AXIS: 48 DEGREES
VENTRICULAR RATE: 88 BPM
VENTRICULAR RATE: 89 BPM

## 2021-11-29 NOTE — CASE MANAGEMENT
Case Management Discharge Planning Note    Patient name Loyd Pacheco  Location ICU 10/ICU 10 MRN [id number]  : 1955 Date 2021       Current Admission Date: 2021  Current Admission Diagnosis:Acute hypoxemic respiratory failure due to [address])   Patient Active Problem List    Diagnosis Date Noted    Cardiac arrest ([facility] ) 2021    Elevated troponin 2021    Gram-positive bacteremia 2021    Hx of CABG 2021    Essential hypertension 2021    CKD (chronic kidney disease) 2021    Acute renal failure superimposed on stage 3 chronic kidney disease ([facility] ) 2021    Acute hypoxemic respiratory failure due to [address]) 2021    Sepsis due to [address]) 2021    Type 2 diabetes mellitus, without long-term current use of insulin ([place] [address] ) 2020    Class 2 severe obesity due to excess calories with serious comorbidity and body mass index (BMI) of 35 0 to 35 9 in adult ([facility] ) 2020    Hyperparathyroidism due to renal insufficiency ([place] [address] ) 2020    Atherosclerosis of native coronary artery with angina pectoris ([place] [address] ) 2020    Bronchitis 2019    Vertigo 2019    Upper respiratory tract infection 2019    Microcytic anemia 2018    Thrombocytopenia ([facility] ) 2018      LOS (days): 9  Geometric Mean LOS (GMLOS) (days): 4 80  Days to GMLOS:-4 3     OBJECTIVE:  Risk of Unplanned Readmission Score: 25         Current admission status: Inpatient   Preferred Pharmacy:   [address] [place] [id number]  Phone: [phone] Fax: [phone]    Primary Care Provider: Shereen Catherine MD    Primary Insurance: MEDICARE  Secondary Insurance: [facility]    DISCHARGE DETAILS:           CM spoke with patient's son Rebecca Albarran, who inquired about patient's death certificate in relation to  arrangements and COVID-19 diagnosis   OTIS provided son with number for medical records ([phone])  Son states he is currently between two  homes and will update CM when he chooses

## 2021-11-30 LAB
ATRIAL RATE: 82 BPM
ATRIAL RATE: 87 BPM
ATRIAL RATE: 92 BPM
P AXIS: 14 DEGREES
PR INTERVAL: 433 MS
PR INTERVAL: 728 MS
QRS AXIS: -6 DEGREES
QRS AXIS: -9 DEGREES
QRS AXIS: 11 DEGREES
QRSD INTERVAL: 100 MS
QRSD INTERVAL: 113 MS
QRSD INTERVAL: 113 MS
QT INTERVAL: 342 MS
QT INTERVAL: 358 MS
QT INTERVAL: 400 MS
QTC INTERVAL: 419 MS
QTC INTERVAL: 423 MS
QTC INTERVAL: 529 MS
T WAVE AXIS: 100 DEGREES
T WAVE AXIS: 100 DEGREES
T WAVE AXIS: 108 DEGREES
VENTRICULAR RATE: 105 BPM
VENTRICULAR RATE: 82 BPM
VENTRICULAR RATE: 92 BPM

## 2021-11-30 PROCEDURE — NC001 PR NO CHARGE: Performed by: INTERNAL MEDICINE

## 2023-01-13 NOTE — ASSESSMENT & PLAN NOTE
History of CAD status post CABG and stents  Not on aspirin or beta blockers  Continue Imdur and statin Pt with difficulty breathing/ Had X-ray done which shows mass on lung

## 2025-01-23 NOTE — QUICK NOTE
Notified of uptrending trop in setting of severe Covid; as per RN, KEITH Martin stated no concern unless Trop >150   Patient has continued chest pain since admission    · ECHO 11/21 LVEF 67% severe LV hypertrophy   · Will give ASA bolus  · , /71  · Will give one dose IV Lopressor 2 5mg for tachycardia  · Repeat trop & EKG Patient confirmed scheduled appointment:  Date: 1/27/2025  Time: 2:20pm   Visit type: Return/Foot/Ankle  Provider:   Location: [place]